# Patient Record
Sex: FEMALE | Race: WHITE | NOT HISPANIC OR LATINO
[De-identification: names, ages, dates, MRNs, and addresses within clinical notes are randomized per-mention and may not be internally consistent; named-entity substitution may affect disease eponyms.]

---

## 2016-06-17 RX ORDER — AMIODARONE HYDROCHLORIDE 400 MG/1
1 TABLET ORAL
Qty: 0 | Refills: 0 | DISCHARGE
Start: 2016-06-17

## 2017-06-10 ENCOUNTER — APPOINTMENT (OUTPATIENT)
Dept: MRI IMAGING | Facility: CLINIC | Age: 82
End: 2017-06-10

## 2017-06-10 ENCOUNTER — APPOINTMENT (OUTPATIENT)
Dept: ULTRASOUND IMAGING | Facility: CLINIC | Age: 82
End: 2017-06-10

## 2017-06-10 ENCOUNTER — OUTPATIENT (OUTPATIENT)
Dept: OUTPATIENT SERVICES | Facility: HOSPITAL | Age: 82
LOS: 1 days | End: 2017-06-10
Payer: MEDICARE

## 2017-06-10 DIAGNOSIS — Z00.8 ENCOUNTER FOR OTHER GENERAL EXAMINATION: ICD-10-CM

## 2017-06-10 DIAGNOSIS — Z98.89 OTHER SPECIFIED POSTPROCEDURAL STATES: Chronic | ICD-10-CM

## 2017-06-10 DIAGNOSIS — Z90.710 ACQUIRED ABSENCE OF BOTH CERVIX AND UTERUS: Chronic | ICD-10-CM

## 2017-06-10 PROCEDURE — 93880 EXTRACRANIAL BILAT STUDY: CPT

## 2017-06-16 ENCOUNTER — APPOINTMENT (OUTPATIENT)
Dept: CT IMAGING | Facility: CLINIC | Age: 82
End: 2017-06-16

## 2017-06-16 ENCOUNTER — OUTPATIENT (OUTPATIENT)
Dept: OUTPATIENT SERVICES | Facility: HOSPITAL | Age: 82
LOS: 1 days | End: 2017-06-16
Payer: MEDICARE

## 2017-06-16 DIAGNOSIS — R51 HEADACHE: ICD-10-CM

## 2017-06-16 DIAGNOSIS — Z90.710 ACQUIRED ABSENCE OF BOTH CERVIX AND UTERUS: Chronic | ICD-10-CM

## 2017-06-16 DIAGNOSIS — Z98.89 OTHER SPECIFIED POSTPROCEDURAL STATES: Chronic | ICD-10-CM

## 2017-06-16 PROCEDURE — 70450 CT HEAD/BRAIN W/O DYE: CPT

## 2017-06-16 PROCEDURE — 72125 CT NECK SPINE W/O DYE: CPT

## 2017-06-21 DIAGNOSIS — M50.221 OTHER CERVICAL DISC DISPLACEMENT AT C4-C5 LEVEL: ICD-10-CM

## 2017-06-21 DIAGNOSIS — R42 DIZZINESS AND GIDDINESS: ICD-10-CM

## 2017-06-21 DIAGNOSIS — M50.30 OTHER CERVICAL DISC DEGENERATION, UNSPECIFIED CERVICAL REGION: ICD-10-CM

## 2017-06-21 DIAGNOSIS — M50.222 OTHER CERVICAL DISC DISPLACEMENT AT C5-C6 LEVEL: ICD-10-CM

## 2017-07-22 ENCOUNTER — APPOINTMENT (OUTPATIENT)
Dept: ORTHOPEDIC SURGERY | Facility: CLINIC | Age: 82
End: 2017-07-22

## 2017-07-22 VITALS
HEART RATE: 77 BPM | BODY MASS INDEX: 22.73 KG/M2 | DIASTOLIC BLOOD PRESSURE: 83 MMHG | WEIGHT: 150 LBS | SYSTOLIC BLOOD PRESSURE: 154 MMHG | HEIGHT: 68 IN

## 2017-07-22 DIAGNOSIS — Z78.9 OTHER SPECIFIED HEALTH STATUS: ICD-10-CM

## 2017-07-22 DIAGNOSIS — Z87.39 PERSONAL HISTORY OF OTHER DISEASES OF THE MUSCULOSKELETAL SYSTEM AND CONNECTIVE TISSUE: ICD-10-CM

## 2017-07-22 DIAGNOSIS — Z86.39 PERSONAL HISTORY OF OTHER ENDOCRINE, NUTRITIONAL AND METABOLIC DISEASE: ICD-10-CM

## 2017-07-22 DIAGNOSIS — Z86.69 PERSONAL HISTORY OF OTHER DISEASES OF THE NERVOUS SYSTEM AND SENSE ORGANS: ICD-10-CM

## 2017-07-22 DIAGNOSIS — Z80.41 FAMILY HISTORY OF MALIGNANT NEOPLASM OF OVARY: ICD-10-CM

## 2017-07-22 DIAGNOSIS — G95.9 DISEASE OF SPINAL CORD, UNSPECIFIED: ICD-10-CM

## 2017-07-22 DIAGNOSIS — Z82.61 FAMILY HISTORY OF ARTHRITIS: ICD-10-CM

## 2017-08-02 ENCOUNTER — OUTPATIENT (OUTPATIENT)
Dept: OUTPATIENT SERVICES | Facility: HOSPITAL | Age: 82
LOS: 1 days | End: 2017-08-02
Payer: MEDICARE

## 2017-08-02 DIAGNOSIS — M47.812 SPONDYLOSIS WITHOUT MYELOPATHY OR RADICULOPATHY, CERVICAL REGION: ICD-10-CM

## 2017-08-02 DIAGNOSIS — Z90.710 ACQUIRED ABSENCE OF BOTH CERVIX AND UTERUS: Chronic | ICD-10-CM

## 2017-08-02 DIAGNOSIS — Z98.89 OTHER SPECIFIED POSTPROCEDURAL STATES: Chronic | ICD-10-CM

## 2017-09-09 PROCEDURE — 97010 HOT OR COLD PACKS THERAPY: CPT

## 2017-09-09 PROCEDURE — G8982: CPT | Mod: CJ

## 2017-09-09 PROCEDURE — G8981: CPT | Mod: CK

## 2017-09-09 PROCEDURE — 97140 MANUAL THERAPY 1/> REGIONS: CPT

## 2017-09-09 PROCEDURE — 97162 PT EVAL MOD COMPLEX 30 MIN: CPT

## 2017-09-09 PROCEDURE — 97110 THERAPEUTIC EXERCISES: CPT

## 2017-09-13 ENCOUNTER — OUTPATIENT (OUTPATIENT)
Dept: OUTPATIENT SERVICES | Facility: HOSPITAL | Age: 82
LOS: 1 days | End: 2017-09-13
Payer: MEDICARE

## 2017-09-13 DIAGNOSIS — Z51.89 ENCOUNTER FOR OTHER SPECIFIED AFTERCARE: ICD-10-CM

## 2017-09-13 DIAGNOSIS — R42 DIZZINESS AND GIDDINESS: ICD-10-CM

## 2017-09-13 DIAGNOSIS — Z90.710 ACQUIRED ABSENCE OF BOTH CERVIX AND UTERUS: Chronic | ICD-10-CM

## 2017-09-13 DIAGNOSIS — Z98.89 OTHER SPECIFIED POSTPROCEDURAL STATES: Chronic | ICD-10-CM

## 2017-09-13 DIAGNOSIS — R27.9 UNSPECIFIED LACK OF COORDINATION: ICD-10-CM

## 2017-10-13 PROCEDURE — 97163 PT EVAL HIGH COMPLEX 45 MIN: CPT

## 2017-10-13 PROCEDURE — 97112 NEUROMUSCULAR REEDUCATION: CPT

## 2017-10-13 PROCEDURE — G8990: CPT | Mod: CJ

## 2017-10-13 PROCEDURE — G8979: CPT | Mod: CH

## 2017-10-13 PROCEDURE — G8991: CPT | Mod: CH

## 2017-10-13 PROCEDURE — G8978: CPT | Mod: CJ

## 2018-01-10 ENCOUNTER — APPOINTMENT (OUTPATIENT)
Dept: ORTHOPEDIC SURGERY | Facility: CLINIC | Age: 83
End: 2018-01-10
Payer: MEDICARE

## 2018-01-10 VITALS — HEIGHT: 68 IN | BODY MASS INDEX: 24.25 KG/M2 | WEIGHT: 160 LBS

## 2018-01-10 PROCEDURE — 99214 OFFICE O/P EST MOD 30 MIN: CPT | Mod: 25

## 2018-01-10 PROCEDURE — 96372 THER/PROPH/DIAG INJ SC/IM: CPT

## 2018-02-21 ENCOUNTER — EMERGENCY (EMERGENCY)
Facility: HOSPITAL | Age: 83
LOS: 1 days | Discharge: HOME | End: 2018-02-21
Attending: EMERGENCY MEDICINE
Payer: MEDICARE

## 2018-02-21 VITALS
SYSTOLIC BLOOD PRESSURE: 190 MMHG | HEIGHT: 68 IN | WEIGHT: 149.91 LBS | DIASTOLIC BLOOD PRESSURE: 120 MMHG | OXYGEN SATURATION: 98 % | TEMPERATURE: 98 F | HEART RATE: 86 BPM | RESPIRATION RATE: 18 BRPM

## 2018-02-21 VITALS
HEART RATE: 64 BPM | DIASTOLIC BLOOD PRESSURE: 83 MMHG | RESPIRATION RATE: 20 BRPM | OXYGEN SATURATION: 99 % | SYSTOLIC BLOOD PRESSURE: 168 MMHG

## 2018-02-21 DIAGNOSIS — Z98.89 OTHER SPECIFIED POSTPROCEDURAL STATES: Chronic | ICD-10-CM

## 2018-02-21 DIAGNOSIS — Z90.710 ACQUIRED ABSENCE OF BOTH CERVIX AND UTERUS: Chronic | ICD-10-CM

## 2018-02-21 LAB
ALBUMIN SERPL ELPH-MCNC: 4.6 G/DL — SIGNIFICANT CHANGE UP (ref 3.3–5.2)
ALP SERPL-CCNC: 55 U/L — SIGNIFICANT CHANGE UP (ref 40–120)
ALT FLD-CCNC: 14 U/L — SIGNIFICANT CHANGE UP
ANION GAP SERPL CALC-SCNC: 15 MMOL/L — SIGNIFICANT CHANGE UP (ref 5–17)
APTT BLD: 37.5 SEC — HIGH (ref 27.5–37.4)
AST SERPL-CCNC: 27 U/L — SIGNIFICANT CHANGE UP
BILIRUB SERPL-MCNC: 0.8 MG/DL — SIGNIFICANT CHANGE UP (ref 0.4–2)
BLD GP AB SCN SERPL QL: SIGNIFICANT CHANGE UP
BUN SERPL-MCNC: 14 MG/DL — SIGNIFICANT CHANGE UP (ref 8–20)
CALCIUM SERPL-MCNC: 9.3 MG/DL — SIGNIFICANT CHANGE UP (ref 8.6–10.2)
CHLORIDE SERPL-SCNC: 96 MMOL/L — LOW (ref 98–107)
CO2 SERPL-SCNC: 24 MMOL/L — SIGNIFICANT CHANGE UP (ref 22–29)
CREAT SERPL-MCNC: 1.09 MG/DL — SIGNIFICANT CHANGE UP (ref 0.5–1.3)
GLUCOSE SERPL-MCNC: 105 MG/DL — SIGNIFICANT CHANGE UP (ref 70–115)
HCT VFR BLD CALC: 39.5 % — SIGNIFICANT CHANGE UP (ref 37–47)
HGB BLD-MCNC: 13.1 G/DL — SIGNIFICANT CHANGE UP (ref 12–16)
INR BLD: 1.23 RATIO — HIGH (ref 0.88–1.16)
MCHC RBC-ENTMCNC: 33.2 G/DL — SIGNIFICANT CHANGE UP (ref 32–36)
MCHC RBC-ENTMCNC: 34.7 PG — HIGH (ref 27–31)
MCV RBC AUTO: 104.5 FL — HIGH (ref 81–99)
PLATELET # BLD AUTO: 208 K/UL — SIGNIFICANT CHANGE UP (ref 150–400)
POTASSIUM SERPL-MCNC: 3.9 MMOL/L — SIGNIFICANT CHANGE UP (ref 3.5–5.3)
POTASSIUM SERPL-SCNC: 3.9 MMOL/L — SIGNIFICANT CHANGE UP (ref 3.5–5.3)
PROT SERPL-MCNC: 8 G/DL — SIGNIFICANT CHANGE UP (ref 6.6–8.7)
PROTHROM AB SERPL-ACNC: 13.6 SEC — HIGH (ref 9.8–12.7)
RBC # BLD: 3.78 M/UL — LOW (ref 4.4–5.2)
RBC # FLD: 13.5 % — SIGNIFICANT CHANGE UP (ref 11–15.6)
SODIUM SERPL-SCNC: 135 MMOL/L — SIGNIFICANT CHANGE UP (ref 135–145)
TYPE + AB SCN PNL BLD: SIGNIFICANT CHANGE UP
WBC # BLD: 6.2 K/UL — SIGNIFICANT CHANGE UP (ref 4.8–10.8)
WBC # FLD AUTO: 6.2 K/UL — SIGNIFICANT CHANGE UP (ref 4.8–10.8)

## 2018-02-21 PROCEDURE — 86850 RBC ANTIBODY SCREEN: CPT

## 2018-02-21 PROCEDURE — 30905 CONTROL OF NOSEBLEED: CPT

## 2018-02-21 PROCEDURE — 85610 PROTHROMBIN TIME: CPT

## 2018-02-21 PROCEDURE — 36415 COLL VENOUS BLD VENIPUNCTURE: CPT

## 2018-02-21 PROCEDURE — 80053 COMPREHEN METABOLIC PANEL: CPT

## 2018-02-21 PROCEDURE — 86900 BLOOD TYPING SEROLOGIC ABO: CPT

## 2018-02-21 PROCEDURE — 99284 EMERGENCY DEPT VISIT MOD MDM: CPT | Mod: 25

## 2018-02-21 PROCEDURE — 85027 COMPLETE CBC AUTOMATED: CPT

## 2018-02-21 PROCEDURE — 30905 CONTROL OF NOSEBLEED: CPT | Mod: LT

## 2018-02-21 PROCEDURE — 99283 EMERGENCY DEPT VISIT LOW MDM: CPT | Mod: 25

## 2018-02-21 PROCEDURE — 86901 BLOOD TYPING SEROLOGIC RH(D): CPT

## 2018-02-21 PROCEDURE — 85730 THROMBOPLASTIN TIME PARTIAL: CPT

## 2018-02-21 RX ORDER — CEPHALEXIN 500 MG
1 CAPSULE ORAL
Qty: 15 | Refills: 0
Start: 2018-02-21 | End: 2018-02-25

## 2018-02-21 RX ORDER — OXYMETAZOLINE HYDROCHLORIDE 0.5 MG/ML
2 SPRAY NASAL ONCE
Qty: 0 | Refills: 0 | Status: COMPLETED | OUTPATIENT
Start: 2018-02-21 | End: 2018-02-21

## 2018-02-21 RX ADMIN — OXYMETAZOLINE HYDROCHLORIDE 2 SPRAY(S): 0.5 SPRAY NASAL at 08:15

## 2018-02-21 NOTE — ED PROVIDER NOTE - CHPI ED SYMPTOMS NEG
no change in level of consciousness/no loss of consciousness/no vomiting/no weakness/no numbness/no syncope

## 2018-02-21 NOTE — ED PROVIDER NOTE - OBJECTIVE STATEMENT
89 y/o female states she takes Eliquis for afib and she was well until this morning she was washing her face and she suddenly noted a left nostril bleed and it has been bleeding steadily since no other c/o no prior nose bleeds  she did not take her am bp meds

## 2018-02-21 NOTE — ED PROVIDER NOTE - PROGRESS NOTE DETAILS
pts ent Dr Bergson called and office says Dr Cruz covering Dr Cruz ent called and case discussed bleeding now stopped he rec give keflex as prophylaxis and pt should follow up with Dr Rosales in 5 days for packing removal and return here is any problems

## 2018-02-21 NOTE — ED ADULT NURSE NOTE - OBJECTIVE STATEMENT
88 year old female in no acuter distress, alert and oriented x 4, complains of nosebleed onset this morning approx 0730. Pt with moderate bleeding, clots from left nares. Pt noted to be hypertensive, anxious. c/o headache, Iv placed, bloods drawn,  and Dr Baer at bedside, fall precautions in place, will monitor. dr Baer gave verbal order for metoprolol 25 mg as pt normally takes at home and pt medicated as ordered as 0827, will chart on emar when order entered and verified.

## 2018-02-21 NOTE — ED ADULT NURSE REASSESSMENT NOTE - NS ED NURSE REASSESS COMMENT FT1
pt hemodynamically stable, PIV removed, refer to flowsheet and chart, pt to be discharged, pt understood discharge instructions and plan of care, pt to follow up outpatient, to be discharged with nasal packing

## 2018-03-09 ENCOUNTER — APPOINTMENT (OUTPATIENT)
Dept: PHYSICAL MEDICINE AND REHAB | Facility: CLINIC | Age: 83
End: 2018-03-09

## 2018-05-16 ENCOUNTER — APPOINTMENT (OUTPATIENT)
Dept: ULTRASOUND IMAGING | Facility: CLINIC | Age: 83
End: 2018-05-16
Payer: MEDICARE

## 2018-05-16 ENCOUNTER — APPOINTMENT (OUTPATIENT)
Dept: CT IMAGING | Facility: CLINIC | Age: 83
End: 2018-05-16
Payer: MEDICARE

## 2018-05-16 ENCOUNTER — OUTPATIENT (OUTPATIENT)
Dept: OUTPATIENT SERVICES | Facility: HOSPITAL | Age: 83
LOS: 1 days | End: 2018-05-16
Payer: MEDICARE

## 2018-05-16 DIAGNOSIS — Z98.89 OTHER SPECIFIED POSTPROCEDURAL STATES: Chronic | ICD-10-CM

## 2018-05-16 DIAGNOSIS — Z90.710 ACQUIRED ABSENCE OF BOTH CERVIX AND UTERUS: Chronic | ICD-10-CM

## 2018-05-16 DIAGNOSIS — Z00.8 ENCOUNTER FOR OTHER GENERAL EXAMINATION: ICD-10-CM

## 2018-05-16 PROCEDURE — 72131 CT LUMBAR SPINE W/O DYE: CPT | Mod: 26

## 2018-05-16 PROCEDURE — 72128 CT CHEST SPINE W/O DYE: CPT | Mod: 26

## 2018-05-16 PROCEDURE — 71250 CT THORAX DX C-: CPT

## 2018-05-16 PROCEDURE — 76775 US EXAM ABDO BACK WALL LIM: CPT | Mod: 26

## 2018-05-16 PROCEDURE — 72128 CT CHEST SPINE W/O DYE: CPT

## 2018-05-16 PROCEDURE — 71250 CT THORAX DX C-: CPT | Mod: 26

## 2018-05-16 PROCEDURE — 76775 US EXAM ABDO BACK WALL LIM: CPT

## 2018-05-16 PROCEDURE — 72131 CT LUMBAR SPINE W/O DYE: CPT

## 2018-07-11 NOTE — ED PROVIDER NOTE - ENMT, MLM
Allergic RX
Airway patent, Nasal positive left nostril bleed too much bleeding to see site of bleeding Mouth with normal mucosa. Throat has no vesicles, no oropharyngeal exudates and uvula is midline.

## 2018-09-07 ENCOUNTER — OTHER (OUTPATIENT)
Age: 83
End: 2018-09-07

## 2018-10-29 ENCOUNTER — APPOINTMENT (OUTPATIENT)
Dept: DERMATOLOGY | Facility: CLINIC | Age: 83
End: 2018-10-29

## 2018-12-06 ENCOUNTER — OUTPATIENT (OUTPATIENT)
Dept: OUTPATIENT SERVICES | Facility: HOSPITAL | Age: 83
LOS: 1 days | End: 2018-12-06
Payer: MEDICARE

## 2018-12-06 ENCOUNTER — APPOINTMENT (OUTPATIENT)
Dept: RADIOLOGY | Facility: CLINIC | Age: 83
End: 2018-12-06

## 2018-12-06 DIAGNOSIS — Z98.89 OTHER SPECIFIED POSTPROCEDURAL STATES: Chronic | ICD-10-CM

## 2018-12-06 DIAGNOSIS — Z00.8 ENCOUNTER FOR OTHER GENERAL EXAMINATION: ICD-10-CM

## 2018-12-06 DIAGNOSIS — Z90.710 ACQUIRED ABSENCE OF BOTH CERVIX AND UTERUS: Chronic | ICD-10-CM

## 2018-12-06 PROCEDURE — 71046 X-RAY EXAM CHEST 2 VIEWS: CPT | Mod: 26

## 2018-12-06 PROCEDURE — 71046 X-RAY EXAM CHEST 2 VIEWS: CPT

## 2018-12-25 ENCOUNTER — EMERGENCY (EMERGENCY)
Facility: HOSPITAL | Age: 83
LOS: 1 days | Discharge: HOME | End: 2018-12-25
Attending: EMERGENCY MEDICINE
Payer: MEDICARE

## 2018-12-25 VITALS
RESPIRATION RATE: 20 BRPM | SYSTOLIC BLOOD PRESSURE: 160 MMHG | HEART RATE: 78 BPM | TEMPERATURE: 98 F | DIASTOLIC BLOOD PRESSURE: 79 MMHG | OXYGEN SATURATION: 99 %

## 2018-12-25 VITALS
WEIGHT: 139.99 LBS | OXYGEN SATURATION: 99 % | HEIGHT: 68 IN | SYSTOLIC BLOOD PRESSURE: 163 MMHG | TEMPERATURE: 97 F | RESPIRATION RATE: 20 BRPM | DIASTOLIC BLOOD PRESSURE: 88 MMHG | HEART RATE: 93 BPM

## 2018-12-25 DIAGNOSIS — Z90.710 ACQUIRED ABSENCE OF BOTH CERVIX AND UTERUS: Chronic | ICD-10-CM

## 2018-12-25 DIAGNOSIS — Z98.89 OTHER SPECIFIED POSTPROCEDURAL STATES: Chronic | ICD-10-CM

## 2018-12-25 LAB
ALBUMIN SERPL ELPH-MCNC: 4.2 G/DL — SIGNIFICANT CHANGE UP (ref 3.3–5.2)
ALP SERPL-CCNC: 61 U/L — SIGNIFICANT CHANGE UP (ref 40–120)
ALT FLD-CCNC: 23 U/L — SIGNIFICANT CHANGE UP
ANION GAP SERPL CALC-SCNC: 14 MMOL/L — SIGNIFICANT CHANGE UP (ref 5–17)
APPEARANCE UR: CLEAR — SIGNIFICANT CHANGE UP
APTT BLD: 38.5 SEC — HIGH (ref 27.5–36.3)
AST SERPL-CCNC: 41 U/L — HIGH
BACTERIA # UR AUTO: ABNORMAL
BASOPHILS # BLD AUTO: 0 K/UL — SIGNIFICANT CHANGE UP (ref 0–0.2)
BASOPHILS NFR BLD AUTO: 0.5 % — SIGNIFICANT CHANGE UP (ref 0–2)
BILIRUB SERPL-MCNC: 0.8 MG/DL — SIGNIFICANT CHANGE UP (ref 0.4–2)
BILIRUB UR-MCNC: NEGATIVE — SIGNIFICANT CHANGE UP
BLD GP AB SCN SERPL QL: SIGNIFICANT CHANGE UP
BUN SERPL-MCNC: 9 MG/DL — SIGNIFICANT CHANGE UP (ref 8–20)
CALCIUM SERPL-MCNC: 9 MG/DL — SIGNIFICANT CHANGE UP (ref 8.6–10.2)
CHLORIDE SERPL-SCNC: 93 MMOL/L — LOW (ref 98–107)
CO2 SERPL-SCNC: 23 MMOL/L — SIGNIFICANT CHANGE UP (ref 22–29)
COLOR SPEC: YELLOW — SIGNIFICANT CHANGE UP
CREAT SERPL-MCNC: 0.69 MG/DL — SIGNIFICANT CHANGE UP (ref 0.5–1.3)
DIFF PNL FLD: ABNORMAL
EOSINOPHIL # BLD AUTO: 0.1 K/UL — SIGNIFICANT CHANGE UP (ref 0–0.5)
EOSINOPHIL NFR BLD AUTO: 1.6 % — SIGNIFICANT CHANGE UP (ref 0–6)
EPI CELLS # UR: SIGNIFICANT CHANGE UP
GLUCOSE SERPL-MCNC: 104 MG/DL — SIGNIFICANT CHANGE UP (ref 70–115)
GLUCOSE UR QL: NEGATIVE MG/DL — SIGNIFICANT CHANGE UP
HCT VFR BLD CALC: 34.1 % — LOW (ref 37–47)
HGB BLD-MCNC: 11.3 G/DL — LOW (ref 12–16)
INR BLD: 1.24 RATIO — HIGH (ref 0.88–1.16)
KETONES UR-MCNC: NEGATIVE — SIGNIFICANT CHANGE UP
LEUKOCYTE ESTERASE UR-ACNC: NEGATIVE — SIGNIFICANT CHANGE UP
LIDOCAIN IGE QN: 48 U/L — SIGNIFICANT CHANGE UP (ref 22–51)
LYMPHOCYTES # BLD AUTO: 1.4 K/UL — SIGNIFICANT CHANGE UP (ref 1–4.8)
LYMPHOCYTES # BLD AUTO: 24.6 % — SIGNIFICANT CHANGE UP (ref 20–55)
MCHC RBC-ENTMCNC: 32.2 PG — HIGH (ref 27–31)
MCHC RBC-ENTMCNC: 33.1 G/DL — SIGNIFICANT CHANGE UP (ref 32–36)
MCV RBC AUTO: 97.2 FL — SIGNIFICANT CHANGE UP (ref 81–99)
MONOCYTES # BLD AUTO: 0.6 K/UL — SIGNIFICANT CHANGE UP (ref 0–0.8)
MONOCYTES NFR BLD AUTO: 11.1 % — HIGH (ref 3–10)
NEUTROPHILS # BLD AUTO: 3.4 K/UL — SIGNIFICANT CHANGE UP (ref 1.8–8)
NEUTROPHILS NFR BLD AUTO: 62 % — SIGNIFICANT CHANGE UP (ref 37–73)
NITRITE UR-MCNC: NEGATIVE — SIGNIFICANT CHANGE UP
PH UR: 7 — SIGNIFICANT CHANGE UP (ref 5–8)
PLATELET # BLD AUTO: 186 K/UL — SIGNIFICANT CHANGE UP (ref 150–400)
POTASSIUM SERPL-MCNC: 3.8 MMOL/L — SIGNIFICANT CHANGE UP (ref 3.5–5.3)
POTASSIUM SERPL-SCNC: 3.8 MMOL/L — SIGNIFICANT CHANGE UP (ref 3.5–5.3)
PROT SERPL-MCNC: 7.4 G/DL — SIGNIFICANT CHANGE UP (ref 6.6–8.7)
PROT UR-MCNC: 30 MG/DL
PROTHROM AB SERPL-ACNC: 14.3 SEC — HIGH (ref 10–12.9)
RBC # BLD: 3.51 M/UL — LOW (ref 4.4–5.2)
RBC # FLD: 13.4 % — SIGNIFICANT CHANGE UP (ref 11–15.6)
RBC CASTS # UR COMP ASSIST: ABNORMAL /HPF (ref 0–4)
SODIUM SERPL-SCNC: 130 MMOL/L — LOW (ref 135–145)
SP GR SPEC: 1 — LOW (ref 1.01–1.02)
TYPE + AB SCN PNL BLD: SIGNIFICANT CHANGE UP
UROBILINOGEN FLD QL: NEGATIVE MG/DL — SIGNIFICANT CHANGE UP
WBC # BLD: 5.5 K/UL — SIGNIFICANT CHANGE UP (ref 4.8–10.8)
WBC # FLD AUTO: 5.5 K/UL — SIGNIFICANT CHANGE UP (ref 4.8–10.8)
WBC UR QL: SIGNIFICANT CHANGE UP

## 2018-12-25 PROCEDURE — 74176 CT ABD & PELVIS W/O CONTRAST: CPT

## 2018-12-25 PROCEDURE — 99284 EMERGENCY DEPT VISIT MOD MDM: CPT

## 2018-12-25 PROCEDURE — 36415 COLL VENOUS BLD VENIPUNCTURE: CPT

## 2018-12-25 PROCEDURE — 96374 THER/PROPH/DIAG INJ IV PUSH: CPT

## 2018-12-25 PROCEDURE — 99284 EMERGENCY DEPT VISIT MOD MDM: CPT | Mod: 25

## 2018-12-25 PROCEDURE — 83690 ASSAY OF LIPASE: CPT

## 2018-12-25 PROCEDURE — 74176 CT ABD & PELVIS W/O CONTRAST: CPT | Mod: 26

## 2018-12-25 PROCEDURE — 87086 URINE CULTURE/COLONY COUNT: CPT

## 2018-12-25 PROCEDURE — 86901 BLOOD TYPING SEROLOGIC RH(D): CPT

## 2018-12-25 PROCEDURE — 86900 BLOOD TYPING SEROLOGIC ABO: CPT

## 2018-12-25 PROCEDURE — 85610 PROTHROMBIN TIME: CPT

## 2018-12-25 PROCEDURE — 81001 URINALYSIS AUTO W/SCOPE: CPT

## 2018-12-25 PROCEDURE — 80053 COMPREHEN METABOLIC PANEL: CPT

## 2018-12-25 PROCEDURE — 86850 RBC ANTIBODY SCREEN: CPT

## 2018-12-25 PROCEDURE — 85730 THROMBOPLASTIN TIME PARTIAL: CPT

## 2018-12-25 PROCEDURE — 85027 COMPLETE CBC AUTOMATED: CPT

## 2018-12-25 RX ORDER — OXYCODONE AND ACETAMINOPHEN 5; 325 MG/1; MG/1
1 TABLET ORAL ONCE
Qty: 0 | Refills: 0 | Status: COMPLETED | OUTPATIENT
Start: 2018-12-25 | End: 2018-12-25

## 2018-12-25 RX ORDER — MORPHINE SULFATE 50 MG/1
2 CAPSULE, EXTENDED RELEASE ORAL ONCE
Qty: 0 | Refills: 0 | Status: DISCONTINUED | OUTPATIENT
Start: 2018-12-25 | End: 2018-12-25

## 2018-12-25 RX ADMIN — MORPHINE SULFATE 2 MILLIGRAM(S): 50 CAPSULE, EXTENDED RELEASE ORAL at 06:39

## 2018-12-25 NOTE — ED PROVIDER NOTE - NS ED MD DISPO DISCHARGE CCDA
pt to ER from nursing home w/ report of erratic behavior and R-side facial droop.  Pt combative on arrival in ER, 12 lead ekg done and shown to ER attending physician.  Pt maintained on CM.  IV access established, labs drawn and sent.  Pt to CT.  Breathing unlabored, skin warm and dry. 1:1 observer in place.  Will continue to monitor. pt to ER from nursing home w/ report of erratic behavior and L-side facial droop.  Pt combative on arrival in ER, 12 lead ekg done and shown to ER attending physician.  Pt maintained on CM.  IV access established, labs drawn and sent.  Pt to CT.  Breathing unlabored, skin warm and dry. 1:1 observer in place.  Will continue to monitor. pt to ER from nursing home w/ report of erratic behavior and L-side facial droop.  Pt combative on arrival in ER, 12 lead ekg done and shown to ER attending physician.  Right-side facial droop w/ L. facial twitch noted.  arm and leg strength 5/5 maricarmen.  Pt's eyes tracking w/ ER staff interaction but pt agitated, non-cooperative and combative.  Pt verbalizing that she wants to get out of bed and wants to leave ER.  Two point restraints initiated.  Pt maintained on CM.  IV access established, labs drawn and sent.  1mg ativan given IV to prepare pt for CT.  Pt to CT.  Breathing unlabored at rest, skin warm and dry. 1:1 observer in place.  Will continue to monitor. Patient/Caregiver provided printed discharge information.

## 2018-12-25 NOTE — ED PROVIDER NOTE - NS ED ROS FT
ROS: CONTUSIONAL: Denies fever, chills, fatigue, wt loss. HEAD: Denies trauma, HA, Dizziness. EYE: Denies Acute visual changes, diplopia. ENMT: Denies change in hearing, tinnitus, epistaxis, difficulty swallowing, sore throat. CARDIO: Denies CP, palpitations, edema. RESP: Denies Cough, SOB , Diff breathing, hemoptysis. GI: Denies V, change in bowel movement. URINARY: Denies difficulty urinating, pelvic pain. MS:  Denies joint pain, back pain, weakness, decreased ROM, swelling. NEURO: Denies change in gait, seizures, loss of sensation, dizziness, confusion LOC.  PSY: NO SI/HI.

## 2018-12-25 NOTE — ED PROVIDER NOTE - ATTENDING CONTRIBUTION TO CARE
I personally saw the patient with the PA, and completed the key components of the history and physical exam. I then discussed the management plan with the PA. In brief Hx (flank pain )Exam( left CVAT) Plan (PT found to have renal colic and will follow up urology )

## 2018-12-25 NOTE — ED PROVIDER NOTE - OBJECTIVE STATEMENT
PT with SPMHX of A-fib, GERD presents to the ED with complaint of RT lower back pain x3 days that have gotten progressively worse. PT states that pain is sever radiating to her groin, sharp and has been causing her to be N. PT states that they have not done anything for the pain prior to coming to the ED. PT dines fever, chills, weakness, numbness, vaginal bleeding, urinary symptoms.

## 2018-12-25 NOTE — ED PROVIDER NOTE - ABDOMINAL EXAM
no guarding no rebound. Rt sided CVAT./no pulsating masses/nontender.../soft/no organomegaly/nondistended

## 2018-12-25 NOTE — ED PROVIDER NOTE - MEDICAL DECISION MAKING DETAILS
PT with stable VS, no acute distress, non toxic appearing, tolerating PO in the ED, resolution of symptoms after conservative medical intervention, PT with small kidney stone, no s/s of infection, chronic hyponatremia will not tx at this time, DC home follow up to PCP, urology pt educated about when to return to the ED if needed. PT verbalizes that he understands all instructions and results.

## 2018-12-25 NOTE — ED ADULT NURSE NOTE - OBJECTIVE STATEMENT
Received patient sitting in bed, a&ox3, able to make needs known. Patient states she's been having back pain for a few days now and just took tylenol with no effect. Patient on eliquis for afib, with left chest wall pacemaker. Patient able to ambulate with 1 person assist from wheelchair to bed. Patient denies chest pain, sob, dizziness, headache, fever and chills. Patient not in distress.

## 2018-12-26 LAB
CULTURE RESULTS: NO GROWTH — SIGNIFICANT CHANGE UP
SPECIMEN SOURCE: SIGNIFICANT CHANGE UP

## 2019-01-01 ENCOUNTER — EMERGENCY (EMERGENCY)
Facility: HOSPITAL | Age: 84
LOS: 1 days | Discharge: HOME | End: 2019-01-01
Attending: STUDENT IN AN ORGANIZED HEALTH CARE EDUCATION/TRAINING PROGRAM
Payer: MEDICARE

## 2019-01-01 VITALS
WEIGHT: 149.91 LBS | RESPIRATION RATE: 18 BRPM | HEIGHT: 68 IN | OXYGEN SATURATION: 98 % | SYSTOLIC BLOOD PRESSURE: 186 MMHG | HEART RATE: 90 BPM | DIASTOLIC BLOOD PRESSURE: 125 MMHG | TEMPERATURE: 97 F

## 2019-01-01 DIAGNOSIS — Z98.89 OTHER SPECIFIED POSTPROCEDURAL STATES: Chronic | ICD-10-CM

## 2019-01-01 DIAGNOSIS — Z90.710 ACQUIRED ABSENCE OF BOTH CERVIX AND UTERUS: Chronic | ICD-10-CM

## 2019-01-01 LAB
ALBUMIN SERPL ELPH-MCNC: 4.2 G/DL — SIGNIFICANT CHANGE UP (ref 3.3–5.2)
ALP SERPL-CCNC: 68 U/L — SIGNIFICANT CHANGE UP (ref 40–120)
ALT FLD-CCNC: 23 U/L — SIGNIFICANT CHANGE UP
ANION GAP SERPL CALC-SCNC: 14 MMOL/L — SIGNIFICANT CHANGE UP (ref 5–17)
APPEARANCE UR: CLEAR — SIGNIFICANT CHANGE UP
AST SERPL-CCNC: 41 U/L — HIGH
BASOPHILS # BLD AUTO: 0 K/UL — SIGNIFICANT CHANGE UP (ref 0–0.2)
BASOPHILS NFR BLD AUTO: 0.9 % — SIGNIFICANT CHANGE UP (ref 0–2)
BILIRUB SERPL-MCNC: 0.6 MG/DL — SIGNIFICANT CHANGE UP (ref 0.4–2)
BILIRUB UR-MCNC: NEGATIVE — SIGNIFICANT CHANGE UP
BUN SERPL-MCNC: 13 MG/DL — SIGNIFICANT CHANGE UP (ref 8–20)
CALCIUM SERPL-MCNC: 9 MG/DL — SIGNIFICANT CHANGE UP (ref 8.6–10.2)
CHLORIDE SERPL-SCNC: 95 MMOL/L — LOW (ref 98–107)
CO2 SERPL-SCNC: 23 MMOL/L — SIGNIFICANT CHANGE UP (ref 22–29)
COLOR SPEC: YELLOW — SIGNIFICANT CHANGE UP
CREAT SERPL-MCNC: 0.85 MG/DL — SIGNIFICANT CHANGE UP (ref 0.5–1.3)
DIFF PNL FLD: ABNORMAL
EOSINOPHIL # BLD AUTO: 0.2 K/UL — SIGNIFICANT CHANGE UP (ref 0–0.5)
EOSINOPHIL NFR BLD AUTO: 2.6 % — SIGNIFICANT CHANGE UP (ref 0–6)
GLUCOSE SERPL-MCNC: 95 MG/DL — SIGNIFICANT CHANGE UP (ref 70–115)
GLUCOSE UR QL: NEGATIVE MG/DL — SIGNIFICANT CHANGE UP
HCT VFR BLD CALC: 32.8 % — LOW (ref 37–47)
HGB BLD-MCNC: 10.5 G/DL — LOW (ref 12–16)
KETONES UR-MCNC: NEGATIVE — SIGNIFICANT CHANGE UP
LEUKOCYTE ESTERASE UR-ACNC: ABNORMAL
LYMPHOCYTES # BLD AUTO: 2 K/UL — SIGNIFICANT CHANGE UP (ref 1–4.8)
LYMPHOCYTES # BLD AUTO: 35.4 % — SIGNIFICANT CHANGE UP (ref 20–55)
MCHC RBC-ENTMCNC: 31.6 PG — HIGH (ref 27–31)
MCHC RBC-ENTMCNC: 32 G/DL — SIGNIFICANT CHANGE UP (ref 32–36)
MCV RBC AUTO: 98.8 FL — SIGNIFICANT CHANGE UP (ref 81–99)
MONOCYTES # BLD AUTO: 0.6 K/UL — SIGNIFICANT CHANGE UP (ref 0–0.8)
MONOCYTES NFR BLD AUTO: 10.2 % — HIGH (ref 3–10)
NEUTROPHILS # BLD AUTO: 2.9 K/UL — SIGNIFICANT CHANGE UP (ref 1.8–8)
NEUTROPHILS NFR BLD AUTO: 50.7 % — SIGNIFICANT CHANGE UP (ref 37–73)
NITRITE UR-MCNC: NEGATIVE — SIGNIFICANT CHANGE UP
PH UR: 5 — SIGNIFICANT CHANGE UP (ref 5–8)
PLATELET # BLD AUTO: 192 K/UL — SIGNIFICANT CHANGE UP (ref 150–400)
POTASSIUM SERPL-MCNC: 4.5 MMOL/L — SIGNIFICANT CHANGE UP (ref 3.5–5.3)
POTASSIUM SERPL-SCNC: 4.5 MMOL/L — SIGNIFICANT CHANGE UP (ref 3.5–5.3)
PROT SERPL-MCNC: 7.4 G/DL — SIGNIFICANT CHANGE UP (ref 6.6–8.7)
PROT UR-MCNC: 15 MG/DL
RBC # BLD: 3.32 M/UL — LOW (ref 4.4–5.2)
RBC # FLD: 13.9 % — SIGNIFICANT CHANGE UP (ref 11–15.6)
SODIUM SERPL-SCNC: 132 MMOL/L — LOW (ref 135–145)
SP GR SPEC: 1.01 — SIGNIFICANT CHANGE UP (ref 1.01–1.02)
UROBILINOGEN FLD QL: NEGATIVE MG/DL — SIGNIFICANT CHANGE UP
WBC # BLD: 5.7 K/UL — SIGNIFICANT CHANGE UP (ref 4.8–10.8)
WBC # FLD AUTO: 5.7 K/UL — SIGNIFICANT CHANGE UP (ref 4.8–10.8)

## 2019-01-01 PROCEDURE — 99284 EMERGENCY DEPT VISIT MOD MDM: CPT

## 2019-01-01 PROCEDURE — 74177 CT ABD & PELVIS W/CONTRAST: CPT | Mod: 26

## 2019-01-01 RX ORDER — KETOROLAC TROMETHAMINE 30 MG/ML
15 SYRINGE (ML) INJECTION ONCE
Qty: 0 | Refills: 0 | Status: DISCONTINUED | OUTPATIENT
Start: 2019-01-01 | End: 2019-01-01

## 2019-01-01 RX ORDER — TAMSULOSIN HYDROCHLORIDE 0.4 MG/1
0.4 CAPSULE ORAL AT BEDTIME
Qty: 0 | Refills: 0 | Status: DISCONTINUED | OUTPATIENT
Start: 2019-01-01 | End: 2019-01-06

## 2019-01-01 RX ORDER — MORPHINE SULFATE 50 MG/1
2 CAPSULE, EXTENDED RELEASE ORAL ONCE
Qty: 0 | Refills: 0 | Status: DISCONTINUED | OUTPATIENT
Start: 2019-01-01 | End: 2019-01-01

## 2019-01-01 RX ORDER — ONDANSETRON 8 MG/1
4 TABLET, FILM COATED ORAL ONCE
Qty: 0 | Refills: 0 | Status: COMPLETED | OUTPATIENT
Start: 2019-01-01 | End: 2019-01-01

## 2019-01-01 RX ORDER — SODIUM CHLORIDE 9 MG/ML
1000 INJECTION INTRAMUSCULAR; INTRAVENOUS; SUBCUTANEOUS ONCE
Qty: 0 | Refills: 0 | Status: COMPLETED | OUTPATIENT
Start: 2019-01-01 | End: 2019-01-01

## 2019-01-01 RX ORDER — MORPHINE SULFATE 50 MG/1
4 CAPSULE, EXTENDED RELEASE ORAL ONCE
Qty: 0 | Refills: 0 | Status: DISCONTINUED | OUTPATIENT
Start: 2019-01-01 | End: 2019-01-01

## 2019-01-01 RX ADMIN — MORPHINE SULFATE 2 MILLIGRAM(S): 50 CAPSULE, EXTENDED RELEASE ORAL at 20:35

## 2019-01-01 RX ADMIN — MORPHINE SULFATE 2 MILLIGRAM(S): 50 CAPSULE, EXTENDED RELEASE ORAL at 21:26

## 2019-01-01 RX ADMIN — SODIUM CHLORIDE 1000 MILLILITER(S): 9 INJECTION INTRAMUSCULAR; INTRAVENOUS; SUBCUTANEOUS at 21:35

## 2019-01-01 RX ADMIN — MORPHINE SULFATE 4 MILLIGRAM(S): 50 CAPSULE, EXTENDED RELEASE ORAL at 23:09

## 2019-01-01 RX ADMIN — MORPHINE SULFATE 4 MILLIGRAM(S): 50 CAPSULE, EXTENDED RELEASE ORAL at 21:25

## 2019-01-01 RX ADMIN — TAMSULOSIN HYDROCHLORIDE 0.4 MILLIGRAM(S): 0.4 CAPSULE ORAL at 21:22

## 2019-01-01 RX ADMIN — SODIUM CHLORIDE 1000 MILLILITER(S): 9 INJECTION INTRAMUSCULAR; INTRAVENOUS; SUBCUTANEOUS at 20:35

## 2019-01-01 RX ADMIN — ONDANSETRON 4 MILLIGRAM(S): 8 TABLET, FILM COATED ORAL at 20:35

## 2019-01-01 NOTE — ED ADULT TRIAGE NOTE - CHIEF COMPLAINT QUOTE
Patient arrived to ED today with c/o right flank pain.  Patient recently diagnosed with kidney stone.

## 2019-01-01 NOTE — ED STATDOCS - PROGRESS NOTE DETAILS
PA Note: Pt evaluated by intake physician. HPI/ROS/PE as noted above. Will follow up plan per intake physician and continue to assess patient. PA Note: Pt continues to c/o pain. Morphine 4mg ordered; CT abd/pelv PO & IV for further evaluation. PA Note: Pt evaluated by intake physician. HPI/ROS/PE as noted above. Will follow up plan per intake physician and continue to assess patient.  Additional Note: No rashes appreciated. PA Note: Improvement of pain. PA Note: Denies pain and nausea. PO challenge; discharge. PA Note: Tolerating PO. Continues to remain asymptomatic. Would like to go home with outpatient follow up.

## 2019-01-01 NOTE — ED ADULT NURSE NOTE - NSIMPLEMENTINTERV_GEN_ALL_ED
Implemented All Universal Safety Interventions:  [place] to call system. Call bell, personal items and telephone within reach. Instruct patient to call for assistance. Room bathroom lighting operational. Non-slip footwear when patient is off stretcher. Physically safe environment: no spills, clutter or unnecessary equipment. Stretcher in lowest position, wheels locked, appropriate side rails in place.

## 2019-01-01 NOTE — ED STATDOCS - ATTENDING CONTRIBUTION TO CARE
I, Antonella Ba, performed the initial face to face bedside interview with this patient regarding history of present illness, review of symptoms and relevant past medical, social and family history.  I completed an independent physical examination.  I was the initial provider who evaluated this patient. I have signed out the follow up of any pending tests (i.e. labs, radiological studies) to the ACP.  I have communicated the patient’s plan of care and disposition with the ACP.  The history, relevant review of systems, past medical and surgical history, medical decision making, and physical examination was documented by the scribe in my presence and I attest to the accuracy of the documentation.

## 2019-01-01 NOTE — ED STATDOCS - OBJECTIVE STATEMENT
87 y/o F pt with hx of GERD, HTN and Afib presents to ED with  c/o R sided flank pain. Pt reports she was here 1 week ago (12/25), with similar symptoms, and was told she had a kidney stone. Pt has not been able to see a urologist yet. Denies fever. No further complaints at this time.

## 2019-01-01 NOTE — ED ADULT NURSE NOTE - OBJECTIVE STATEMENT
pt reports right sided flank pain since shawna, resolved and then came back today. explained as a stabbing intermittent pain.

## 2019-01-02 VITALS
SYSTOLIC BLOOD PRESSURE: 168 MMHG | DIASTOLIC BLOOD PRESSURE: 84 MMHG | OXYGEN SATURATION: 100 % | HEART RATE: 86 BPM | RESPIRATION RATE: 18 BRPM

## 2019-01-02 LAB
EPI CELLS # UR: SIGNIFICANT CHANGE UP
RBC CASTS # UR COMP ASSIST: SIGNIFICANT CHANGE UP /HPF (ref 0–4)
WBC UR QL: SIGNIFICANT CHANGE UP

## 2019-01-02 PROCEDURE — 96374 THER/PROPH/DIAG INJ IV PUSH: CPT

## 2019-01-02 PROCEDURE — 93005 ELECTROCARDIOGRAM TRACING: CPT

## 2019-01-02 PROCEDURE — 93010 ELECTROCARDIOGRAM REPORT: CPT

## 2019-01-02 PROCEDURE — 96376 TX/PRO/DX INJ SAME DRUG ADON: CPT

## 2019-01-02 PROCEDURE — 85027 COMPLETE CBC AUTOMATED: CPT

## 2019-01-02 PROCEDURE — 36415 COLL VENOUS BLD VENIPUNCTURE: CPT

## 2019-01-02 PROCEDURE — 81001 URINALYSIS AUTO W/SCOPE: CPT

## 2019-01-02 PROCEDURE — 96375 TX/PRO/DX INJ NEW DRUG ADDON: CPT | Mod: XU

## 2019-01-02 PROCEDURE — 80053 COMPREHEN METABOLIC PANEL: CPT

## 2019-01-02 PROCEDURE — 74177 CT ABD & PELVIS W/CONTRAST: CPT

## 2019-01-02 PROCEDURE — 99284 EMERGENCY DEPT VISIT MOD MDM: CPT | Mod: 25

## 2019-01-02 PROCEDURE — 96361 HYDRATE IV INFUSION ADD-ON: CPT

## 2019-01-02 RX ORDER — ONDANSETRON 8 MG/1
4 TABLET, FILM COATED ORAL ONCE
Qty: 0 | Refills: 0 | Status: COMPLETED | OUTPATIENT
Start: 2019-01-02 | End: 2019-01-02

## 2019-01-02 RX ADMIN — ONDANSETRON 4 MILLIGRAM(S): 8 TABLET, FILM COATED ORAL at 00:39

## 2019-01-08 ENCOUNTER — OUTPATIENT (OUTPATIENT)
Dept: OUTPATIENT SERVICES | Facility: HOSPITAL | Age: 84
LOS: 1 days | End: 2019-01-08
Payer: MEDICARE

## 2019-01-08 ENCOUNTER — APPOINTMENT (OUTPATIENT)
Dept: RADIOLOGY | Facility: CLINIC | Age: 84
End: 2019-01-08

## 2019-01-08 DIAGNOSIS — Z98.89 OTHER SPECIFIED POSTPROCEDURAL STATES: Chronic | ICD-10-CM

## 2019-01-08 DIAGNOSIS — Z00.8 ENCOUNTER FOR OTHER GENERAL EXAMINATION: ICD-10-CM

## 2019-01-08 DIAGNOSIS — Z90.710 ACQUIRED ABSENCE OF BOTH CERVIX AND UTERUS: Chronic | ICD-10-CM

## 2019-01-08 PROCEDURE — 71046 X-RAY EXAM CHEST 2 VIEWS: CPT | Mod: 26

## 2019-01-08 PROCEDURE — 71046 X-RAY EXAM CHEST 2 VIEWS: CPT

## 2019-01-24 ENCOUNTER — APPOINTMENT (OUTPATIENT)
Dept: PULMONOLOGY | Facility: CLINIC | Age: 84
End: 2019-01-24
Payer: MEDICARE

## 2019-01-24 VITALS
HEART RATE: 82 BPM | OXYGEN SATURATION: 98 % | BODY MASS INDEX: 20.61 KG/M2 | DIASTOLIC BLOOD PRESSURE: 68 MMHG | SYSTOLIC BLOOD PRESSURE: 120 MMHG | WEIGHT: 136 LBS | HEIGHT: 68 IN

## 2019-01-24 DIAGNOSIS — R05 COUGH: ICD-10-CM

## 2019-01-24 PROCEDURE — 94664 DEMO&/EVAL PT USE INHALER: CPT | Mod: 59

## 2019-01-24 PROCEDURE — 99205 OFFICE O/P NEW HI 60 MIN: CPT | Mod: 25

## 2019-01-24 PROCEDURE — 94060 EVALUATION OF WHEEZING: CPT

## 2019-01-24 RX ORDER — AMIODARONE HYDROCHLORIDE 400 MG/1
TABLET ORAL
Refills: 0 | Status: DISCONTINUED | COMMUNITY
End: 2019-01-24

## 2019-01-24 RX ORDER — METHYLPREDNISOLONE 4 MG/1
4 TABLET ORAL
Qty: 1 | Refills: 1 | Status: DISCONTINUED | COMMUNITY
Start: 2018-01-10 | End: 2019-01-24

## 2019-01-24 RX ORDER — TIZANIDINE 4 MG/1
4 TABLET ORAL EVERY 8 HOURS
Qty: 60 | Refills: 3 | Status: DISCONTINUED | COMMUNITY
Start: 2018-01-10 | End: 2019-01-24

## 2019-01-24 NOTE — PROCEDURE
[FreeTextEntry1] : CT chest 5/18: bronchial wall thickening, thyromegaly, no tracheal obs\par \par CXR 1/19; NAD, PPM

## 2019-01-24 NOTE — PHYSICAL EXAM
[General Appearance - Well Developed] : well developed [General Appearance - Well Nourished] : well nourished [Normal Conjunctiva] : the conjunctiva exhibited no abnormalities [Normal Oropharynx] : normal oropharynx [II] : II [Neck Appearance] : the appearance of the neck was normal [Heart Rate And Rhythm] : heart rate and rhythm were normal [Heart Sounds] : normal S1 and S2 [Murmurs] : no murmurs present [Respiration, Rhythm And Depth] : normal respiratory rhythm and effort [Exaggerated Use Of Accessory Muscles For Inspiration] : no accessory muscle use [Auscultation Breath Sounds / Voice Sounds] : lungs were clear to auscultation bilaterally [Lungs Percussion] : the lungs were normal to percussion [Abnormal Walk] : normal gait [Nail Clubbing] : no clubbing of the fingernails [Cyanosis, Localized] : no localized cyanosis [] : no rash [No Focal Deficits] : no focal deficits [Oriented To Time, Place, And Person] : oriented to person, place, and time [Impaired Insight] : insight and judgment were intact [Affect] : the affect was normal [Memory Recent] : recent memory was not impaired [FreeTextEntry1] : no chest wall abn

## 2019-01-24 NOTE — HISTORY OF PRESENT ILLNESS
[FreeTextEntry1] : cough x months\par worse at night time\par denies any rhinitis or GERD\par received abx\par cough has improved\par denies anty swallowing issues \par never smoked\par able to perform ADLs \par no Env exposures\par has a fib, followed by Dr Quintana, on full AC

## 2019-01-24 NOTE — DISCUSSION/SUMMARY
[FreeTextEntry1] : Asthma mild-moderate persistent, PAF on full AC, chronic dyspnea at baseline\par improving cough, worsened by URI, denies rhinitis or GERD\par no bronchospasm on exam , normal sp02\par spirometry with mild-moderate air flow obstruction\par recent CXR negative 1/19, CT 5/18 with bronchial wall thickening c/w airways disease\par Discussed options with pt, she does not want a controller inhaler ( ICS/LABA) or Singulair at this time\par Will allow medrol dose pack and prn rescue\par Asthma profile to better evaluate phenotype\par 2-3 months or sooner if needed

## 2019-01-24 NOTE — CONSULT LETTER
[Dear  ___] : Dear  [unfilled], [Consult Letter:] : I had the pleasure of evaluating your patient, [unfilled]. [Please see my note below.] : Please see my note below. [Sincerely,] : Sincerely, [FreeTextEntry3] : Emery Reardon DO [facility]\par Pulmonary Critical Care\par Director Pulmonary Division\par Medical Director Respiratory Therapy\par [facility]\par \par  [DrLian  ___] : Dr. JOHNSON [DrLian ___] : Dr. JOHNSON

## 2019-04-11 ENCOUNTER — APPOINTMENT (OUTPATIENT)
Dept: PULMONOLOGY | Facility: CLINIC | Age: 84
End: 2019-04-11
Payer: MEDICARE

## 2019-04-11 VITALS
DIASTOLIC BLOOD PRESSURE: 80 MMHG | OXYGEN SATURATION: 98 % | HEART RATE: 66 BPM | WEIGHT: 135 LBS | BODY MASS INDEX: 20.53 KG/M2 | SYSTOLIC BLOOD PRESSURE: 118 MMHG

## 2019-04-11 PROCEDURE — 99213 OFFICE O/P EST LOW 20 MIN: CPT

## 2019-04-11 RX ORDER — METHYLPREDNISOLONE 4 MG/1
4 TABLET ORAL
Qty: 1 | Refills: 3 | Status: DISCONTINUED | COMMUNITY
Start: 2019-01-24 | End: 2019-04-11

## 2019-04-11 NOTE — HISTORY OF PRESENT ILLNESS
[FreeTextEntry1] : no new pulmonary complaints\par using prn rescue\par has a fib, followed by Dr Quintana, on full AC

## 2019-04-11 NOTE — CONSULT LETTER
[Dear  ___] : Dear  [unfilled], [Please see my note below.] : Please see my note below. [Consult Letter:] : I had the pleasure of evaluating your patient, [unfilled]. [DrLian  ___] : Dr. JOHNSON [Sincerely,] : Sincerely, [DrLian ___] : Dr. JOHNSON [FreeTextEntry3] : [facility] DO [facility]\par Pulmonary Critical Care\par Director Pulmonary Division\par Medical Director Respiratory Therapy\par [facility]\par \par

## 2019-04-11 NOTE — DISCUSSION/SUMMARY
[FreeTextEntry1] : Asthma mild-moderate persistent, PAF on full AC, chronic dyspnea at baseline\par no bronchospasm on exam , normal sp02\par doing well, no pulmonary complaints\par spirometry with mild-moderate air flow obstruction\par recent CXR negative 1/19, CT 5/18 with bronchial wall thickening c/w airways disease\par Discussed options with pt, she does not want a controller inhaler ( ICS/LABA) or Singulair at this time\par 6 months or sooner if needed

## 2019-04-11 NOTE — PHYSICAL EXAM
[General Appearance - Well Developed] : well developed [General Appearance - Well Nourished] : well nourished [Normal Conjunctiva] : the conjunctiva exhibited no abnormalities [Normal Oropharynx] : normal oropharynx [II] : II [Neck Appearance] : the appearance of the neck was normal [Heart Rate And Rhythm] : heart rate and rhythm were normal [Heart Sounds] : normal S1 and S2 [Murmurs] : no murmurs present [Exaggerated Use Of Accessory Muscles For Inspiration] : no accessory muscle use [Respiration, Rhythm And Depth] : normal respiratory rhythm and effort [Auscultation Breath Sounds / Voice Sounds] : lungs were clear to auscultation bilaterally [Lungs Percussion] : the lungs were normal to percussion [Nail Clubbing] : no clubbing of the fingernails [Cyanosis, Localized] : no localized cyanosis [Abnormal Walk] : normal gait [No Focal Deficits] : no focal deficits [Oriented To Time, Place, And Person] : oriented to person, place, and time [Affect] : the affect was normal [Impaired Insight] : insight and judgment were intact [Memory Recent] : recent memory was not impaired [FreeTextEntry1] : no chest wall abn [] : no rash

## 2019-09-10 ENCOUNTER — OUTPATIENT (OUTPATIENT)
Dept: OUTPATIENT SERVICES | Facility: HOSPITAL | Age: 84
LOS: 1 days | End: 2019-09-10
Payer: MEDICARE

## 2019-09-10 ENCOUNTER — APPOINTMENT (OUTPATIENT)
Dept: RADIOLOGY | Facility: CLINIC | Age: 84
End: 2019-09-10
Payer: MEDICARE

## 2019-09-10 DIAGNOSIS — N20.0 CALCULUS OF KIDNEY: ICD-10-CM

## 2019-09-10 DIAGNOSIS — Z98.89 OTHER SPECIFIED POSTPROCEDURAL STATES: Chronic | ICD-10-CM

## 2019-09-10 DIAGNOSIS — Z90.710 ACQUIRED ABSENCE OF BOTH CERVIX AND UTERUS: Chronic | ICD-10-CM

## 2019-09-10 PROCEDURE — 74018 RADEX ABDOMEN 1 VIEW: CPT

## 2019-09-10 PROCEDURE — 74018 RADEX ABDOMEN 1 VIEW: CPT | Mod: 26

## 2019-10-09 ENCOUNTER — APPOINTMENT (OUTPATIENT)
Dept: PULMONOLOGY | Facility: CLINIC | Age: 84
End: 2019-10-09
Payer: MEDICARE

## 2019-10-09 VITALS
OXYGEN SATURATION: 98 % | WEIGHT: 136 LBS | HEART RATE: 74 BPM | SYSTOLIC BLOOD PRESSURE: 110 MMHG | BODY MASS INDEX: 20.61 KG/M2 | DIASTOLIC BLOOD PRESSURE: 70 MMHG | HEIGHT: 68 IN

## 2019-10-09 PROCEDURE — 99213 OFFICE O/P EST LOW 20 MIN: CPT

## 2019-10-09 NOTE — DISCUSSION/SUMMARY
[FreeTextEntry1] : Asthma mild-moderate persistent, PAF on full AC, chronic dyspnea at baseline\par no bronchospasm on exam , normal sp02\par doing well, no pulmonary complaints\par spirometry with mild-moderate air flow obstruction\par recent CXR negative 1/19, CT 5/18 with bronchial wall thickening c/w airways disease\par Discussed options with pt, she does not want a controller inhaler ( ICS/LABA) or Singulair at this time\par to call if sig rescue use, will f/u  6 months\par vaccinations this fall

## 2019-10-09 NOTE — PHYSICAL EXAM
[General Appearance - Well Developed] : well developed [Normal Conjunctiva] : the conjunctiva exhibited no abnormalities [General Appearance - Well Nourished] : well nourished [Normal Oropharynx] : normal oropharynx [II] : II [Neck Appearance] : the appearance of the neck was normal [Heart Rate And Rhythm] : heart rate and rhythm were normal [Heart Sounds] : normal S1 and S2 [Respiration, Rhythm And Depth] : normal respiratory rhythm and effort [Murmurs] : no murmurs present [Auscultation Breath Sounds / Voice Sounds] : lungs were clear to auscultation bilaterally [Exaggerated Use Of Accessory Muscles For Inspiration] : no accessory muscle use [Lungs Percussion] : the lungs were normal to percussion [Nail Clubbing] : no clubbing of the fingernails [Cyanosis, Localized] : no localized cyanosis [Abnormal Walk] : normal gait [Oriented To Time, Place, And Person] : oriented to person, place, and time [No Focal Deficits] : no focal deficits [Affect] : the affect was normal [Impaired Insight] : insight and judgment were intact [Memory Recent] : recent memory was not impaired [] : no rash [FreeTextEntry1] : no chest wall abn

## 2019-10-09 NOTE — CONSULT LETTER
[Dear  ___] : Dear  [unfilled], [Consult Letter:] : I had the pleasure of evaluating your patient, [unfilled]. [Sincerely,] : Sincerely, [Please see my note below.] : Please see my note below. [DrLian ___] : Dr. JOHNSON [DrLian  ___] : Dr. JOHNSON [FreeTextEntry3] : Emery Reardon DO [facility]\par Pulmonary Critical Care\par Director Pulmonary Division\par Medical Director Respiratory Therapy\par [facility]\par \par

## 2020-04-08 ENCOUNTER — APPOINTMENT (OUTPATIENT)
Dept: ULTRASOUND IMAGING | Facility: CLINIC | Age: 85
End: 2020-04-08
Payer: MEDICARE

## 2020-04-08 ENCOUNTER — APPOINTMENT (OUTPATIENT)
Dept: RADIOLOGY | Facility: CLINIC | Age: 85
End: 2020-04-08
Payer: MEDICARE

## 2020-04-08 ENCOUNTER — OUTPATIENT (OUTPATIENT)
Dept: OUTPATIENT SERVICES | Facility: HOSPITAL | Age: 85
LOS: 1 days | End: 2020-04-08
Payer: MEDICARE

## 2020-04-08 DIAGNOSIS — Z00.8 ENCOUNTER FOR OTHER GENERAL EXAMINATION: ICD-10-CM

## 2020-04-08 DIAGNOSIS — Z90.710 ACQUIRED ABSENCE OF BOTH CERVIX AND UTERUS: Chronic | ICD-10-CM

## 2020-04-08 DIAGNOSIS — Z98.89 OTHER SPECIFIED POSTPROCEDURAL STATES: Chronic | ICD-10-CM

## 2020-04-08 PROCEDURE — 76770 US EXAM ABDO BACK WALL COMP: CPT | Mod: 26

## 2020-04-08 PROCEDURE — 76775 US EXAM ABDO BACK WALL LIM: CPT | Mod: 26

## 2020-04-08 PROCEDURE — 74018 RADEX ABDOMEN 1 VIEW: CPT | Mod: 26

## 2020-04-08 PROCEDURE — 76770 US EXAM ABDO BACK WALL COMP: CPT

## 2020-04-08 PROCEDURE — 74018 RADEX ABDOMEN 1 VIEW: CPT

## 2020-04-08 PROCEDURE — 76775 US EXAM ABDO BACK WALL LIM: CPT

## 2020-04-09 ENCOUNTER — APPOINTMENT (OUTPATIENT)
Dept: CT IMAGING | Facility: CLINIC | Age: 85
End: 2020-04-09
Payer: MEDICARE

## 2020-04-09 ENCOUNTER — OUTPATIENT (OUTPATIENT)
Dept: OUTPATIENT SERVICES | Facility: HOSPITAL | Age: 85
LOS: 1 days | End: 2020-04-09
Payer: MEDICARE

## 2020-04-09 DIAGNOSIS — Z98.89 OTHER SPECIFIED POSTPROCEDURAL STATES: Chronic | ICD-10-CM

## 2020-04-09 DIAGNOSIS — Z90.710 ACQUIRED ABSENCE OF BOTH CERVIX AND UTERUS: Chronic | ICD-10-CM

## 2020-04-09 DIAGNOSIS — Z00.00 ENCOUNTER FOR GENERAL ADULT MEDICAL EXAMINATION WITHOUT ABNORMAL FINDINGS: ICD-10-CM

## 2020-04-09 PROCEDURE — 74176 CT ABD & PELVIS W/O CONTRAST: CPT

## 2020-04-09 PROCEDURE — 74176 CT ABD & PELVIS W/O CONTRAST: CPT | Mod: 26

## 2020-04-22 ENCOUNTER — APPOINTMENT (OUTPATIENT)
Dept: PULMONOLOGY | Facility: CLINIC | Age: 85
End: 2020-04-22
Payer: MEDICARE

## 2020-04-22 PROCEDURE — 99441: CPT

## 2020-10-19 ENCOUNTER — APPOINTMENT (OUTPATIENT)
Dept: PULMONOLOGY | Facility: CLINIC | Age: 85
End: 2020-10-19
Payer: MEDICARE

## 2020-10-19 VITALS
HEIGHT: 65 IN | OXYGEN SATURATION: 99 % | DIASTOLIC BLOOD PRESSURE: 80 MMHG | BODY MASS INDEX: 22.82 KG/M2 | HEART RATE: 101 BPM | WEIGHT: 137 LBS | RESPIRATION RATE: 15 BRPM | SYSTOLIC BLOOD PRESSURE: 120 MMHG

## 2020-10-19 PROCEDURE — 99214 OFFICE O/P EST MOD 30 MIN: CPT

## 2020-10-19 RX ORDER — METOPROLOL SUCCINATE 100 MG/1
TABLET, EXTENDED RELEASE ORAL
Refills: 0 | Status: DISCONTINUED | COMMUNITY
End: 2020-10-19

## 2020-10-19 NOTE — CONSULT LETTER
[Dear  ___] : Dear  [unfilled], [Consult Letter:] : I had the pleasure of evaluating your patient, [unfilled]. [Sincerely,] : Sincerely, [Please see my note below.] : Please see my note below. [DrLian  ___] : Dr. JOHNSON [DrLian ___] : Dr. JOHNSON [FreeTextEntry3] : Emer[facility] Reardon DO [facility]\par Pulmonary Critical Care\par Director Pulmonary Division\par Medical Director Respiratory Therapy\par [facility]\par \par

## 2020-10-19 NOTE — PHYSICAL EXAM
[General Appearance - Well Developed] : well developed [General Appearance - Well Nourished] : well nourished [Normal Conjunctiva] : the conjunctiva exhibited no abnormalities [Normal Oropharynx] : normal oropharynx [II] : II [Neck Appearance] : the appearance of the neck was normal [Heart Rate And Rhythm] : heart rate and rhythm were normal [Heart Sounds] : normal S1 and S2 [Murmurs] : no murmurs present [Respiration, Rhythm And Depth] : normal respiratory rhythm and effort [Exaggerated Use Of Accessory Muscles For Inspiration] : no accessory muscle use [Auscultation Breath Sounds / Voice Sounds] : lungs were clear to auscultation bilaterally [Lungs Percussion] : the lungs were normal to percussion [Nail Clubbing] : no clubbing of the fingernails [Abnormal Walk] : normal gait [Cyanosis, Localized] : no localized cyanosis [No Focal Deficits] : no focal deficits [Oriented To Time, Place, And Person] : oriented to person, place, and time [Memory Recent] : recent memory was not impaired [Impaired Insight] : insight and judgment were intact [Affect] : the affect was normal [] : no rash [FreeTextEntry1] : no chest wall abn

## 2020-10-19 NOTE — HISTORY OF PRESENT ILLNESS
[FreeTextEntry1] : no new pulmonary complaints\par using prn rescue, rarely\par has a fib, followed by Dr Quintana, on full AC

## 2020-10-19 NOTE — DISCUSSION/SUMMARY
[FreeTextEntry1] : Asthma mild-moderate persistent, PAF on full AC, chronic dyspnea at baseline\par no bronchospasm on exam , normal sp02\par doing well, no pulmonary complaints\par spirometry with mild-moderate air flow obstruction\par recent CXR negative 1/19, CT 5/18 with bronchial wall thickening c/w airways disease\par Discussed options with pt, she does not want a controller inhaler ( ICS/LABA) or Singulair at this time\par to call if sig rescue use, will f/u  6 months, does not want PFts currently, re evaluate at f/u\par vaccinations this fall

## 2020-10-30 ENCOUNTER — APPOINTMENT (OUTPATIENT)
Dept: GASTROENTEROLOGY | Facility: CLINIC | Age: 85
End: 2020-10-30
Payer: MEDICARE

## 2020-10-30 VITALS
RESPIRATION RATE: 14 BRPM | HEIGHT: 65 IN | BODY MASS INDEX: 21.66 KG/M2 | OXYGEN SATURATION: 97 % | SYSTOLIC BLOOD PRESSURE: 116 MMHG | WEIGHT: 130 LBS | HEART RATE: 85 BPM | DIASTOLIC BLOOD PRESSURE: 70 MMHG | TEMPERATURE: 96.7 F

## 2020-10-30 DIAGNOSIS — Z86.79 PERSONAL HISTORY OF OTHER DISEASES OF THE CIRCULATORY SYSTEM: ICD-10-CM

## 2020-10-30 DIAGNOSIS — Z86.39 PERSONAL HISTORY OF OTHER ENDOCRINE, NUTRITIONAL AND METABOLIC DISEASE: ICD-10-CM

## 2020-10-30 DIAGNOSIS — I50.9 HEART FAILURE, UNSPECIFIED: ICD-10-CM

## 2020-10-30 DIAGNOSIS — K64.4 RESIDUAL HEMORRHOIDAL SKIN TAGS: ICD-10-CM

## 2020-10-30 PROCEDURE — 82272 OCCULT BLD FECES 1-3 TESTS: CPT

## 2020-10-30 PROCEDURE — 99204 OFFICE O/P NEW MOD 45 MIN: CPT

## 2020-10-30 RX ORDER — APIXABAN 5 MG/1
5 TABLET, FILM COATED ORAL
Refills: 0 | Status: DISCONTINUED | COMMUNITY
End: 2020-10-30

## 2020-10-30 RX ORDER — GABAPENTIN 100 MG/1
CAPSULE ORAL
Refills: 0 | Status: ACTIVE | COMMUNITY

## 2020-10-30 RX ORDER — FUROSEMIDE 40 MG/1
40 TABLET ORAL
Refills: 0 | Status: ACTIVE | COMMUNITY

## 2020-10-30 RX ORDER — METOPROLOL TARTRATE 50 MG/1
50 TABLET, FILM COATED ORAL
Qty: 60 | Refills: 0 | Status: ACTIVE | COMMUNITY
Start: 2020-10-08

## 2020-10-30 RX ORDER — MULTIVITAMIN
TABLET ORAL
Refills: 0 | Status: ACTIVE | COMMUNITY

## 2020-10-30 RX ORDER — CALCIUM CITRATE/VITAMIN D3 315MG-6.25
TABLET ORAL
Refills: 0 | Status: ACTIVE | COMMUNITY

## 2020-10-30 RX ORDER — ASCORBIC ACID 500 MG
TABLET ORAL
Refills: 0 | Status: ACTIVE | COMMUNITY

## 2020-10-30 RX ORDER — APIXABAN 2.5 MG/1
2.5 TABLET, FILM COATED ORAL
Qty: 60 | Refills: 0 | Status: ACTIVE | COMMUNITY
Start: 2019-12-02

## 2020-10-30 RX ORDER — ALBUTEROL SULFATE 90 UG/1
108 (90 BASE) AEROSOL, METERED RESPIRATORY (INHALATION)
Qty: 3 | Refills: 0 | Status: ACTIVE | COMMUNITY
Start: 2019-01-24

## 2020-10-30 RX ORDER — LEVOTHYROXINE SODIUM 0.05 MG/1
50 TABLET ORAL
Refills: 0 | Status: ACTIVE | COMMUNITY

## 2020-10-31 PROBLEM — I50.9 CHF WITH UNKNOWN LVEF: Status: RESOLVED | Noted: 2020-10-31 | Resolved: 2020-10-31

## 2020-10-31 PROBLEM — Z86.79 HISTORY OF HYPERTENSION: Status: RESOLVED | Noted: 2020-10-31 | Resolved: 2020-10-31

## 2020-10-31 PROBLEM — Z86.79 HISTORY OF ATRIAL FIBRILLATION: Status: RESOLVED | Noted: 2020-10-31 | Resolved: 2020-10-31

## 2020-10-31 PROBLEM — Z86.39 HISTORY OF HYPOTHYROIDISM: Status: RESOLVED | Noted: 2020-10-31 | Resolved: 2020-10-31

## 2020-10-31 NOTE — PHYSICAL EXAM
[Sclera] : the sclera and conjunctiva were normal [PERRL With Normal Accommodation] : pupils were equal in size, round, and reactive to light [Extraocular Movements] : extraocular movements were intact [Outer Ear] : the ears and nose were normal in appearance [Oropharynx] : the oropharynx was normal [Neck Appearance] : the appearance of the neck was normal [Neck Cervical Mass (___cm)] : no neck mass was observed [Jugular Venous Distention Increased] : there was no jugular-venous distention [Thyroid Diffuse Enlargement] : the thyroid was not enlarged [Thyroid Nodule] : there were no palpable thyroid nodules [Auscultation Breath Sounds / Voice Sounds] : lungs were clear to auscultation bilaterally [Heart Rate And Rhythm] : heart rate was normal and rhythm regular [Heart Sounds] : normal S1 and S2 [Heart Sounds Gallop] : no gallops [Murmurs] : no murmurs [Heart Sounds Pericardial Friction Rub] : no pericardial rub [Full Pulse] : the pedal pulses are present [Edema] : there was no peripheral edema [Bowel Sounds] : normal bowel sounds [Abdomen Soft] : soft [Abdomen Tenderness] : non-tender [Abdomen Mass (___ Cm)] : no abdominal mass palpated [Normal Sphincter Tone] : normal sphincter tone [No Rectal Mass] : no rectal mass [External Hemorrhoid] : external hemorrhoids [Abnormal Walk] : normal gait [Nail Clubbing] : no clubbing  or cyanosis of the fingernails [Musculoskeletal - Swelling] : no joint swelling seen [Motor Tone] : muscle strength and tone were normal [Skin Color & Pigmentation] : normal skin color and pigmentation [Skin Turgor] : normal skin turgor [] : no rash [Internal Hemorrhoid] : no internal hemorrhoids [Occult Blood Positive] : stool was negative for occult blood [FreeTextEntry1] : Very thin.

## 2020-10-31 NOTE — HISTORY OF PRESENT ILLNESS
[FreeTextEntry1] : 89-year-old white female with history of atrial fibrillation hypertension mild CHF hypothyroidism cervical neuropathy, a moderate weight loss 30 pounds over 3 years. Frail appearing. Hard of hearing. Accompanied by  and daughter Dori Camejo RN at the Endo suite. PCP recorded a stool positive for occult blood on a spontaneously voided sample. Patient reports abdominal notes occur daily or every other day. She has external hemorrhoids that have been present for many years. They are intermittently active and increase with constipation. She will frequently note some bright red blood on the tissue paper with wiping. Denies abdominal pain or alteration in pattern of bowel movements. Denies nausea or vomiting. Patient has had a prior colonoscopy greater than 10 years ago allegedly negative. Results not available.\par Recent blood work is not available. Patient is chronically maintained on anticoagulation for atrial fibrillation with Eliquis. No other new medical issues. All medications are chronic and stable.

## 2020-10-31 NOTE — CONSULT LETTER
[Dear  ___] : Dear  [unfilled], [Consult Letter:] : I had the pleasure of evaluating your patient, [unfilled]. [Please see my note below.] : Please see my note below. [Consult Closing:] : Thank you very much for allowing me to participate in the care of this patient.  If you have any questions, please do not hesitate to contact me. [Sincerely,] : Sincerely, [FreeTextEntry1] : Stool positive for occult blood. Obvious external bleeding hemorrhoids in patient on full anticoagulation with Eliquis.  Frail and comorbidities exist.  I have advised against colonoscopy and against virtual colonoscopy.   [FreeTextEntry3] : Mook Washburn MD FACG\par Diplomate American Board of Internal Medicine and Gastroenterolgy\par [facility]\par

## 2020-10-31 NOTE — ASSESSMENT
[FreeTextEntry1] : Chronic external hemorrhoids intermittently bleeding with low-grade symptomatology.\par Stool positive for occult blood on spontaneously voided stool. Patient is on chronic anticoagulation with Eliquis for atrial fibrillation.\par It is highly probable that the external hemorrhoids are the source for the stool for occult blood. \par Patient has had an abdominal CAT scan with oral and IV contrast in 4/2020 which was negative.\par Last colonoscopy was negative but done so remotely. \par One can never definitively exclude colorectal neoplasia  without a colonoscopy.   However, given the patient's Frailty and multiple medical comorbidities, a colonoscopy would carry inherent risk for bleeding and/or perforation and/or complications of reversal of anticoagulation.  I believe the risks of colonoscopy exceed the potential benefits, and would not proceed with colonoscopy now.\par I believe that a virtual colonoscopy would not had very much information in this case,  as a recent diagnostic abdominal CT was negative 6 months ago.\par Advised GI office followup in 4 months to establish stability.\par

## 2021-01-07 NOTE — ED ADULT TRIAGE NOTE - SPO2 (%)
98 Detail Level: Simple Additional Notes: Chest looks clear, upper arms,face,neck is clear Render Risk Assessment In Note?: no

## 2021-02-09 ENCOUNTER — APPOINTMENT (OUTPATIENT)
Dept: GASTROENTEROLOGY | Facility: CLINIC | Age: 86
End: 2021-02-09

## 2021-02-18 NOTE — ED ADULT NURSE NOTE - NS ED NOTE  TALK SOMEONE YN
Pt presents c/o SOB and anxiety. Pt reports hx of similar c/o over the past month. +SOB at rest, 7/10 generalized ab pain. Denies any anxiety medication, cough, n/v/d, CP, dizziness, exposure to ill. Hx of bipolar and depression.   No

## 2021-04-12 ENCOUNTER — APPOINTMENT (OUTPATIENT)
Dept: PULMONOLOGY | Facility: CLINIC | Age: 86
End: 2021-04-12
Payer: MEDICARE

## 2021-04-12 VITALS
SYSTOLIC BLOOD PRESSURE: 98 MMHG | DIASTOLIC BLOOD PRESSURE: 64 MMHG | WEIGHT: 128 LBS | TEMPERATURE: 97.1 F | RESPIRATION RATE: 14 BRPM | OXYGEN SATURATION: 98 % | BODY MASS INDEX: 21.33 KG/M2 | HEIGHT: 65 IN | HEART RATE: 80 BPM

## 2021-04-12 VITALS — DIASTOLIC BLOOD PRESSURE: 50 MMHG | SYSTOLIC BLOOD PRESSURE: 100 MMHG

## 2021-04-12 DIAGNOSIS — Z23 ENCOUNTER FOR IMMUNIZATION: ICD-10-CM

## 2021-04-12 PROCEDURE — 99213 OFFICE O/P EST LOW 20 MIN: CPT

## 2021-04-12 NOTE — PHYSICAL EXAM
[General Appearance - Well Developed] : well developed [General Appearance - Well Nourished] : well nourished [Normal Conjunctiva] : the conjunctiva exhibited no abnormalities [Normal Oropharynx] : normal oropharynx [II] : II [Neck Appearance] : the appearance of the neck was normal [Heart Rate And Rhythm] : heart rate and rhythm were normal [Heart Sounds] : normal S1 and S2 [Murmurs] : no murmurs present [Respiration, Rhythm And Depth] : normal respiratory rhythm and effort [Exaggerated Use Of Accessory Muscles For Inspiration] : no accessory muscle use [Auscultation Breath Sounds / Voice Sounds] : lungs were clear to auscultation bilaterally [Lungs Percussion] : the lungs were normal to percussion [FreeTextEntry1] : no chest wall abn [Abnormal Walk] : normal gait [Nail Clubbing] : no clubbing of the fingernails [Cyanosis, Localized] : no localized cyanosis [No Focal Deficits] : no focal deficits [Oriented To Time, Place, And Person] : oriented to person, place, and time [Impaired Insight] : insight and judgment were intact [Affect] : the affect was normal [Memory Recent] : recent memory was not impaired [] : no rash

## 2021-04-12 NOTE — DISCUSSION/SUMMARY
[FreeTextEntry1] : Asthma mild-moderate persistent, PAF on full AC, chronic dyspnea at baseline, slight increase rescue use\par no bronchospasm on exam , normal sp02\par doing well, no pulmonary complaints\par spirometry with mild-moderate air flow obstruction\par last  CXR negative 1/19, CT 5/18 with bronchial wall thickening c/w airways disease\par Discussed options with pt, she does not want a controller inhaler ( ICS/LABA) or Singulair at this time\par to call if sig rescue use, will f/u  6 months, does not want PFts currently, re evaluate at f/u\par vaccinations this fall \par BP noted, no bleeding, denies any lightheadedness they will recheck BP at home today and contact PMD with results

## 2021-04-12 NOTE — HISTORY OF PRESENT ILLNESS
[FreeTextEntry1] : no new pulmonary complaints\par using prn rescue, more frequently\par has a fib, followed by Dr Quintana, on full AC\par no fevers, chest pain or dyspnea

## 2021-10-12 ENCOUNTER — APPOINTMENT (OUTPATIENT)
Dept: PULMONOLOGY | Facility: CLINIC | Age: 86
End: 2021-10-12
Payer: MEDICARE

## 2021-10-12 VITALS
HEART RATE: 65 BPM | BODY MASS INDEX: 21.97 KG/M2 | WEIGHT: 132 LBS | DIASTOLIC BLOOD PRESSURE: 72 MMHG | OXYGEN SATURATION: 98 % | RESPIRATION RATE: 14 BRPM | SYSTOLIC BLOOD PRESSURE: 116 MMHG

## 2021-10-12 DIAGNOSIS — J45.40 MODERATE PERSISTENT ASTHMA, UNCOMPLICATED: ICD-10-CM

## 2021-10-12 DIAGNOSIS — J47.9 BRONCHIECTASIS, UNCOMPLICATED: ICD-10-CM

## 2021-10-12 PROCEDURE — 99213 OFFICE O/P EST LOW 20 MIN: CPT

## 2021-10-12 NOTE — CONSULT LETTER
[Dear  ___] : Dear  [unfilled], [Consult Letter:] : I had the pleasure of evaluating your patient, [unfilled]. [Please see my note below.] : Please see my note below. [Sincerely,] : Sincerely, [DrLian  ___] : Dr. JOHNSON [DrLian ___] : Dr. JOHNSON [FreeTextEntry3] : [facility]ardon DO [facility]\par Pulmonary Critical Care\par Director Pulmonary Division\par Medical Director Respiratory Therapy\par [facility]\par \par

## 2021-10-12 NOTE — DISCUSSION/SUMMARY
[FreeTextEntry1] : Asthma mild-moderate persistent, PAF on full AC, at baseline, no sig rescue use\par no bronchospasm on exam , normal sp02\par doing well, no pulmonary complaints\par spirometry with mild-moderate air flow obstruction\par last  CXR negative 1/19, CT 5/18 with bronchial wall thickening c/w airways disease\par Discussed options with pt, she does not want a controller inhaler ( ICS/LABA) or Singulair at this time\par to call if sig rescue use, will f/u  6 months, does not want PFts currently, re evaluate at f/u\par vaccinations this fall ( flu), had Covid x 3\par 6 months or sooner if needed

## 2021-10-12 NOTE — PHYSICAL EXAM
[General Appearance - Well Nourished] : well nourished [General Appearance - Well Developed] : well developed [Normal Conjunctiva] : the conjunctiva exhibited no abnormalities [Normal Oropharynx] : normal oropharynx [II] : II [Neck Appearance] : the appearance of the neck was normal [Heart Rate And Rhythm] : heart rate and rhythm were normal [Heart Sounds] : normal S1 and S2 [Murmurs] : no murmurs present [Respiration, Rhythm And Depth] : normal respiratory rhythm and effort [Exaggerated Use Of Accessory Muscles For Inspiration] : no accessory muscle use [Auscultation Breath Sounds / Voice Sounds] : lungs were clear to auscultation bilaterally [Lungs Percussion] : the lungs were normal to percussion [Abnormal Walk] : normal gait [Nail Clubbing] : no clubbing of the fingernails [Cyanosis, Localized] : no localized cyanosis [No Focal Deficits] : no focal deficits [Oriented To Time, Place, And Person] : oriented to person, place, and time [Impaired Insight] : insight and judgment were intact [Affect] : the affect was normal [Memory Recent] : recent memory was not impaired [] : no rash [FreeTextEntry1] : no chest wall abn

## 2021-10-17 ENCOUNTER — INPATIENT (INPATIENT)
Facility: HOSPITAL | Age: 86
LOS: 3 days | Discharge: HOME | DRG: 644 | End: 2021-10-21
Attending: FAMILY MEDICINE | Admitting: STUDENT IN AN ORGANIZED HEALTH CARE EDUCATION/TRAINING PROGRAM
Payer: MEDICARE

## 2021-10-17 VITALS
DIASTOLIC BLOOD PRESSURE: 105 MMHG | TEMPERATURE: 98 F | OXYGEN SATURATION: 97 % | SYSTOLIC BLOOD PRESSURE: 174 MMHG | HEART RATE: 111 BPM | RESPIRATION RATE: 17 BRPM | HEIGHT: 68 IN | WEIGHT: 132.28 LBS

## 2021-10-17 DIAGNOSIS — Z90.710 ACQUIRED ABSENCE OF BOTH CERVIX AND UTERUS: Chronic | ICD-10-CM

## 2021-10-17 DIAGNOSIS — Z98.89 OTHER SPECIFIED POSTPROCEDURAL STATES: Chronic | ICD-10-CM

## 2021-10-17 LAB
ALBUMIN SERPL ELPH-MCNC: 4.5 G/DL — SIGNIFICANT CHANGE UP (ref 3.3–5.2)
ALP SERPL-CCNC: 65 U/L — SIGNIFICANT CHANGE UP (ref 40–120)
ALT FLD-CCNC: 21 U/L — SIGNIFICANT CHANGE UP
ANION GAP SERPL CALC-SCNC: 14 MMOL/L — SIGNIFICANT CHANGE UP (ref 5–17)
APPEARANCE UR: CLEAR — SIGNIFICANT CHANGE UP
AST SERPL-CCNC: 44 U/L — HIGH
BASOPHILS # BLD AUTO: 0.04 K/UL — SIGNIFICANT CHANGE UP (ref 0–0.2)
BASOPHILS NFR BLD AUTO: 0.7 % — SIGNIFICANT CHANGE UP (ref 0–2)
BILIRUB SERPL-MCNC: 1 MG/DL — SIGNIFICANT CHANGE UP (ref 0.4–2)
BILIRUB UR-MCNC: NEGATIVE — SIGNIFICANT CHANGE UP
BUN SERPL-MCNC: 14.4 MG/DL — SIGNIFICANT CHANGE UP (ref 8–20)
CALCIUM SERPL-MCNC: 9.2 MG/DL — SIGNIFICANT CHANGE UP (ref 8.6–10.2)
CHLORIDE SERPL-SCNC: 88 MMOL/L — LOW (ref 98–107)
CO2 SERPL-SCNC: 21 MMOL/L — LOW (ref 22–29)
COLOR SPEC: YELLOW — SIGNIFICANT CHANGE UP
CREAT SERPL-MCNC: 0.85 MG/DL — SIGNIFICANT CHANGE UP (ref 0.5–1.3)
DIFF PNL FLD: ABNORMAL
EOSINOPHIL # BLD AUTO: 0.11 K/UL — SIGNIFICANT CHANGE UP (ref 0–0.5)
EOSINOPHIL NFR BLD AUTO: 1.8 % — SIGNIFICANT CHANGE UP (ref 0–6)
EPI CELLS # UR: SIGNIFICANT CHANGE UP
GLUCOSE SERPL-MCNC: 113 MG/DL — HIGH (ref 70–99)
GLUCOSE UR QL: NEGATIVE MG/DL — SIGNIFICANT CHANGE UP
HCT VFR BLD CALC: 32.9 % — LOW (ref 34.5–45)
HGB BLD-MCNC: 11.7 G/DL — SIGNIFICANT CHANGE UP (ref 11.5–15.5)
IMM GRANULOCYTES NFR BLD AUTO: 0.3 % — SIGNIFICANT CHANGE UP (ref 0–1.5)
KETONES UR-MCNC: NEGATIVE — SIGNIFICANT CHANGE UP
LACTATE BLDV-MCNC: 1.2 MMOL/L — SIGNIFICANT CHANGE UP (ref 0.5–2)
LEUKOCYTE ESTERASE UR-ACNC: ABNORMAL
LYMPHOCYTES # BLD AUTO: 1.43 K/UL — SIGNIFICANT CHANGE UP (ref 1–3.3)
LYMPHOCYTES # BLD AUTO: 23.3 % — SIGNIFICANT CHANGE UP (ref 13–44)
MCHC RBC-ENTMCNC: 34.3 PG — HIGH (ref 27–34)
MCHC RBC-ENTMCNC: 35.6 GM/DL — SIGNIFICANT CHANGE UP (ref 32–36)
MCV RBC AUTO: 96.5 FL — SIGNIFICANT CHANGE UP (ref 80–100)
MONOCYTES # BLD AUTO: 0.5 K/UL — SIGNIFICANT CHANGE UP (ref 0–0.9)
MONOCYTES NFR BLD AUTO: 8.1 % — SIGNIFICANT CHANGE UP (ref 2–14)
NEUTROPHILS # BLD AUTO: 4.05 K/UL — SIGNIFICANT CHANGE UP (ref 1.8–7.4)
NEUTROPHILS NFR BLD AUTO: 65.8 % — SIGNIFICANT CHANGE UP (ref 43–77)
NITRITE UR-MCNC: NEGATIVE — SIGNIFICANT CHANGE UP
PH UR: 7 — SIGNIFICANT CHANGE UP (ref 5–8)
PLATELET # BLD AUTO: 129 K/UL — LOW (ref 150–400)
POTASSIUM SERPL-MCNC: 4.2 MMOL/L — SIGNIFICANT CHANGE UP (ref 3.5–5.3)
POTASSIUM SERPL-SCNC: 4.2 MMOL/L — SIGNIFICANT CHANGE UP (ref 3.5–5.3)
PROT SERPL-MCNC: 7.9 G/DL — SIGNIFICANT CHANGE UP (ref 6.6–8.7)
PROT UR-MCNC: 100 MG/DL
RBC # BLD: 3.41 M/UL — LOW (ref 3.8–5.2)
RBC # FLD: 12.5 % — SIGNIFICANT CHANGE UP (ref 10.3–14.5)
RBC CASTS # UR COMP ASSIST: SIGNIFICANT CHANGE UP /HPF (ref 0–4)
SODIUM SERPL-SCNC: 123 MMOL/L — LOW (ref 135–145)
SP GR SPEC: 1.01 — SIGNIFICANT CHANGE UP (ref 1.01–1.02)
TROPONIN T SERPL-MCNC: <0.01 NG/ML — SIGNIFICANT CHANGE UP (ref 0–0.06)
UROBILINOGEN FLD QL: NEGATIVE MG/DL — SIGNIFICANT CHANGE UP
WBC # BLD: 6.15 K/UL — SIGNIFICANT CHANGE UP (ref 3.8–10.5)
WBC # FLD AUTO: 6.15 K/UL — SIGNIFICANT CHANGE UP (ref 3.8–10.5)
WBC UR QL: SIGNIFICANT CHANGE UP

## 2021-10-17 PROCEDURE — 99285 EMERGENCY DEPT VISIT HI MDM: CPT

## 2021-10-17 RX ORDER — SODIUM CHLORIDE 9 MG/ML
1000 INJECTION INTRAMUSCULAR; INTRAVENOUS; SUBCUTANEOUS ONCE
Refills: 0 | Status: COMPLETED | OUTPATIENT
Start: 2021-10-17 | End: 2021-10-17

## 2021-10-17 RX ORDER — FAMOTIDINE 10 MG/ML
20 INJECTION INTRAVENOUS ONCE
Refills: 0 | Status: COMPLETED | OUTPATIENT
Start: 2021-10-17 | End: 2021-10-17

## 2021-10-17 RX ORDER — KETOROLAC TROMETHAMINE 30 MG/ML
15 SYRINGE (ML) INJECTION ONCE
Refills: 0 | Status: DISCONTINUED | OUTPATIENT
Start: 2021-10-17 | End: 2021-10-17

## 2021-10-17 RX ORDER — MORPHINE SULFATE 50 MG/1
2 CAPSULE, EXTENDED RELEASE ORAL ONCE
Refills: 0 | Status: DISCONTINUED | OUTPATIENT
Start: 2021-10-17 | End: 2021-10-17

## 2021-10-17 RX ORDER — ONDANSETRON 8 MG/1
4 TABLET, FILM COATED ORAL ONCE
Refills: 0 | Status: COMPLETED | OUTPATIENT
Start: 2021-10-17 | End: 2021-10-17

## 2021-10-17 RX ADMIN — MORPHINE SULFATE 2 MILLIGRAM(S): 50 CAPSULE, EXTENDED RELEASE ORAL at 21:55

## 2021-10-17 RX ADMIN — ONDANSETRON 4 MILLIGRAM(S): 8 TABLET, FILM COATED ORAL at 21:43

## 2021-10-17 RX ADMIN — Medication 15 MILLIGRAM(S): at 23:58

## 2021-10-17 RX ADMIN — MORPHINE SULFATE 2 MILLIGRAM(S): 50 CAPSULE, EXTENDED RELEASE ORAL at 21:43

## 2021-10-17 RX ADMIN — SODIUM CHLORIDE 500 MILLILITER(S): 9 INJECTION INTRAMUSCULAR; INTRAVENOUS; SUBCUTANEOUS at 23:39

## 2021-10-17 RX ADMIN — FAMOTIDINE 20 MILLIGRAM(S): 10 INJECTION INTRAVENOUS at 23:46

## 2021-10-17 NOTE — ED PROVIDER NOTE - HIV OFFER
-- DO NOT REPLY / DO NOT REPLY ALL --  -- Message is from the [facility]--    COVID-19 Universal Screening: N/A - Not about scheduling    General Patient Message      Reason for Call: Patient was discharge. and need insulin needles and needles for injector pen.    Caller Information       Type Contact Phone    10/08/2020 04:49 PM CDT Phone (Incoming) [facility] [phone]          Alternative phone number: none    Turnaround time given to caller:   \"This message will be sent to [state Provider's name]. The clinical team will fulfill your request as soon as they review your message.\"     Opt out

## 2021-10-17 NOTE — ED PROVIDER NOTE - NSICDXFAMILYHX_GEN_ALL_CORE_FT
FAMILY HISTORY:  Mother  Still living? No  Family history of ovarian cancer, Age at diagnosis: Age Unknown

## 2021-10-17 NOTE — ED PROVIDER NOTE - CADM POA PRESS ULCER
[address]   [facility] [address]  Phone: [phone]  August 7, 2017      Moira Boykin  [address] [place] [id number]      Dear Moira,    We care about your health and have reviewed your health plan including your medical conditions, medications, and lab results.  Based on this review, it is recommended that you follow up regarding the following health topic(s):  -Colon Cancer Screening    We recommend you take the following action(s):  -schedule a COLONOSCOPY to look for colon cancer (due every 10 years or 5 years in higher risk situations.)  Colonoscopies can prevent 90-95% of colon cancer deaths.  Problem lesions can be removed before they ever become cancer.  If you do not wish to do a colonoscopy or cannot afford to do one at this time, there is another option called a Fecal Immunochemical Occult Blood Test (FIT) a take home stool sample kit.  It does not replace the colonoscopy for colorectal cancer screening, but it can detect hidden bleeding in the lower colon.  It does need to be repeated every year and if a positive result is obtained, you would be referred for a colonoscopy.  If you have completed either one of these tests at another facility, please have the records sent to our clinic for our records.     Please call us at the [facility] - [phone] (or use [employer]) to address the above recommendations.     Thank you for trusting [facility] ([facility] and we appreciate the opportunity to serve you.  We look forward to supporting your healthcare needs in the future.    Healthy Regards,    Your Health Care Team  [facility]   No

## 2021-10-17 NOTE — ED STATDOCS - PROGRESS NOTE DETAILS
89y/o F with PMHx of HTN; on Metoprolol presents to the ED c/o right sided flank pain which began 4 days ago. Additional c/o nausea with episodes of vomiting. Pt has been taking Tylenol with initial improvement, none currently. Pt states pain is a steady, gnawing sensation. Pt will be placed in the main ED for complete evaluation by another provider. 89y/o F with PMHx of HTN; on Metoprolol presents to the ED c/o right sided flank pain which began 4 days ago. Additional c/o nausea with episodes of vomiting. Pt has been taking Tylenol with initial improvement, none currently. Pt states pain is a steady, gnawing sensation.   Pt will be placed in the main ED for complete evaluation by another provider.

## 2021-10-17 NOTE — ED PROVIDER NOTE - NSICDXPASTMEDICALHX_GEN_ALL_CORE_FT
PAST MEDICAL HISTORY:  Afib     GERD (gastroesophageal reflux disease)     Hypertension     Pacemaker

## 2021-10-17 NOTE — ED ADULT TRIAGE NOTE - CHIEF COMPLAINT QUOTE
Ambulatory complaining of R flank/back pain x4 days, last Tylenol dose @530p. Patient denies urinary complications but reports one episode of N/V at approx 6p.

## 2021-10-17 NOTE — ED PROVIDER NOTE - OBJECTIVE STATEMENT
89 y/o female with PMHx of A-fib, pacemaker in place presents to ED c/o flank pain. Patient reports intermittent, and progressively worsening right flank pain with associated N/V x4 days. Patient reports she has had kidney stones in the past, unsure if this feels similar.     Denies diarrhea, urinary symptoms

## 2021-10-18 DIAGNOSIS — E87.1 HYPO-OSMOLALITY AND HYPONATREMIA: ICD-10-CM

## 2021-10-18 LAB
ANION GAP SERPL CALC-SCNC: 14 MMOL/L — SIGNIFICANT CHANGE UP (ref 5–17)
ANION GAP SERPL CALC-SCNC: 14 MMOL/L — SIGNIFICANT CHANGE UP (ref 5–17)
ANION GAP SERPL CALC-SCNC: 15 MMOL/L — SIGNIFICANT CHANGE UP (ref 5–17)
ANION GAP SERPL CALC-SCNC: 16 MMOL/L — SIGNIFICANT CHANGE UP (ref 5–17)
BASOPHILS # BLD AUTO: 0.03 K/UL — SIGNIFICANT CHANGE UP (ref 0–0.2)
BASOPHILS NFR BLD AUTO: 0.3 % — SIGNIFICANT CHANGE UP (ref 0–2)
BUN SERPL-MCNC: 10 MG/DL — SIGNIFICANT CHANGE UP (ref 8–20)
BUN SERPL-MCNC: 10.2 MG/DL — SIGNIFICANT CHANGE UP (ref 8–20)
BUN SERPL-MCNC: 12.1 MG/DL — SIGNIFICANT CHANGE UP (ref 8–20)
BUN SERPL-MCNC: 9.3 MG/DL — SIGNIFICANT CHANGE UP (ref 8–20)
CALCIUM SERPL-MCNC: 8.5 MG/DL — LOW (ref 8.6–10.2)
CALCIUM SERPL-MCNC: 8.7 MG/DL — SIGNIFICANT CHANGE UP (ref 8.6–10.2)
CALCIUM SERPL-MCNC: 8.8 MG/DL — SIGNIFICANT CHANGE UP (ref 8.6–10.2)
CALCIUM SERPL-MCNC: 9.1 MG/DL — SIGNIFICANT CHANGE UP (ref 8.6–10.2)
CHLORIDE SERPL-SCNC: 88 MMOL/L — LOW (ref 98–107)
CHLORIDE SERPL-SCNC: 89 MMOL/L — LOW (ref 98–107)
CHLORIDE SERPL-SCNC: 89 MMOL/L — LOW (ref 98–107)
CHLORIDE SERPL-SCNC: 90 MMOL/L — LOW (ref 98–107)
CK SERPL-CCNC: 107 U/L — SIGNIFICANT CHANGE UP (ref 25–170)
CO2 SERPL-SCNC: 20 MMOL/L — LOW (ref 22–29)
CO2 SERPL-SCNC: 21 MMOL/L — LOW (ref 22–29)
CREAT SERPL-MCNC: 0.78 MG/DL — SIGNIFICANT CHANGE UP (ref 0.5–1.3)
CREAT SERPL-MCNC: 0.79 MG/DL — SIGNIFICANT CHANGE UP (ref 0.5–1.3)
CREAT SERPL-MCNC: 0.8 MG/DL — SIGNIFICANT CHANGE UP (ref 0.5–1.3)
CREAT SERPL-MCNC: 0.87 MG/DL — SIGNIFICANT CHANGE UP (ref 0.5–1.3)
EOSINOPHIL # BLD AUTO: 0 K/UL — SIGNIFICANT CHANGE UP (ref 0–0.5)
EOSINOPHIL NFR BLD AUTO: 0 % — SIGNIFICANT CHANGE UP (ref 0–6)
GLUCOSE SERPL-MCNC: 113 MG/DL — HIGH (ref 70–99)
GLUCOSE SERPL-MCNC: 131 MG/DL — HIGH (ref 70–99)
GLUCOSE SERPL-MCNC: 163 MG/DL — HIGH (ref 70–99)
GLUCOSE SERPL-MCNC: 166 MG/DL — HIGH (ref 70–99)
HCT VFR BLD CALC: 35 % — SIGNIFICANT CHANGE UP (ref 34.5–45)
HGB BLD-MCNC: 11.8 G/DL — SIGNIFICANT CHANGE UP (ref 11.5–15.5)
IMM GRANULOCYTES NFR BLD AUTO: 0.5 % — SIGNIFICANT CHANGE UP (ref 0–1.5)
LYMPHOCYTES # BLD AUTO: 0.8 K/UL — LOW (ref 1–3.3)
LYMPHOCYTES # BLD AUTO: 9.1 % — LOW (ref 13–44)
MAGNESIUM SERPL-MCNC: 1.6 MG/DL — SIGNIFICANT CHANGE UP (ref 1.6–2.6)
MCHC RBC-ENTMCNC: 32.9 PG — SIGNIFICANT CHANGE UP (ref 27–34)
MCHC RBC-ENTMCNC: 33.7 GM/DL — SIGNIFICANT CHANGE UP (ref 32–36)
MCV RBC AUTO: 97.5 FL — SIGNIFICANT CHANGE UP (ref 80–100)
MONOCYTES # BLD AUTO: 0.34 K/UL — SIGNIFICANT CHANGE UP (ref 0–0.9)
MONOCYTES NFR BLD AUTO: 3.9 % — SIGNIFICANT CHANGE UP (ref 2–14)
NEUTROPHILS # BLD AUTO: 7.56 K/UL — HIGH (ref 1.8–7.4)
NEUTROPHILS NFR BLD AUTO: 86.2 % — HIGH (ref 43–77)
OSMOLALITY SERPL: 269 MOSMOL/KG — LOW (ref 280–301)
OSMOLALITY UR: 384 MOSM/KG — SIGNIFICANT CHANGE UP (ref 300–1000)
PHOSPHATE SERPL-MCNC: 3 MG/DL — SIGNIFICANT CHANGE UP (ref 2.4–4.7)
PLATELET # BLD AUTO: 128 K/UL — LOW (ref 150–400)
POTASSIUM SERPL-MCNC: 3.7 MMOL/L — SIGNIFICANT CHANGE UP (ref 3.5–5.3)
POTASSIUM SERPL-MCNC: 3.8 MMOL/L — SIGNIFICANT CHANGE UP (ref 3.5–5.3)
POTASSIUM SERPL-MCNC: 3.8 MMOL/L — SIGNIFICANT CHANGE UP (ref 3.5–5.3)
POTASSIUM SERPL-MCNC: 4 MMOL/L — SIGNIFICANT CHANGE UP (ref 3.5–5.3)
POTASSIUM SERPL-SCNC: 3.7 MMOL/L — SIGNIFICANT CHANGE UP (ref 3.5–5.3)
POTASSIUM SERPL-SCNC: 3.8 MMOL/L — SIGNIFICANT CHANGE UP (ref 3.5–5.3)
POTASSIUM SERPL-SCNC: 3.8 MMOL/L — SIGNIFICANT CHANGE UP (ref 3.5–5.3)
POTASSIUM SERPL-SCNC: 4 MMOL/L — SIGNIFICANT CHANGE UP (ref 3.5–5.3)
RAPID RVP RESULT: SIGNIFICANT CHANGE UP
RBC # BLD: 3.59 M/UL — LOW (ref 3.8–5.2)
RBC # FLD: 12.4 % — SIGNIFICANT CHANGE UP (ref 10.3–14.5)
SARS-COV-2 RNA SPEC QL NAA+PROBE: SIGNIFICANT CHANGE UP
SODIUM SERPL-SCNC: 123 MMOL/L — LOW (ref 135–145)
SODIUM SERPL-SCNC: 124 MMOL/L — LOW (ref 135–145)
SODIUM SERPL-SCNC: 124 MMOL/L — LOW (ref 135–145)
SODIUM SERPL-SCNC: 125 MMOL/L — LOW (ref 135–145)
SODIUM UR-SCNC: 107 MMOL/L — SIGNIFICANT CHANGE UP
TROPONIN T SERPL-MCNC: <0.01 NG/ML — SIGNIFICANT CHANGE UP (ref 0–0.06)
WBC # BLD: 8.77 K/UL — SIGNIFICANT CHANGE UP (ref 3.8–10.5)
WBC # FLD AUTO: 8.77 K/UL — SIGNIFICANT CHANGE UP (ref 3.8–10.5)

## 2021-10-18 PROCEDURE — G1004: CPT

## 2021-10-18 PROCEDURE — 74176 CT ABD & PELVIS W/O CONTRAST: CPT | Mod: 26,ME

## 2021-10-18 PROCEDURE — 93010 ELECTROCARDIOGRAM REPORT: CPT

## 2021-10-18 PROCEDURE — 72128 CT CHEST SPINE W/O DYE: CPT | Mod: 26

## 2021-10-18 PROCEDURE — 12345: CPT | Mod: NC

## 2021-10-18 PROCEDURE — 72131 CT LUMBAR SPINE W/O DYE: CPT | Mod: 26

## 2021-10-18 PROCEDURE — 99223 1ST HOSP IP/OBS HIGH 75: CPT

## 2021-10-18 PROCEDURE — 70450 CT HEAD/BRAIN W/O DYE: CPT | Mod: 26,MA

## 2021-10-18 RX ORDER — KETOROLAC TROMETHAMINE 30 MG/ML
30 SYRINGE (ML) INJECTION
Refills: 0 | Status: DISCONTINUED | OUTPATIENT
Start: 2021-10-18 | End: 2021-10-20

## 2021-10-18 RX ORDER — LIDOCAINE 4 G/100G
2 CREAM TOPICAL DAILY
Refills: 0 | Status: DISCONTINUED | OUTPATIENT
Start: 2021-10-18 | End: 2021-10-21

## 2021-10-18 RX ORDER — SODIUM CHLORIDE 9 MG/ML
1000 INJECTION INTRAMUSCULAR; INTRAVENOUS; SUBCUTANEOUS
Refills: 0 | Status: DISCONTINUED | OUTPATIENT
Start: 2021-10-18 | End: 2021-10-18

## 2021-10-18 RX ORDER — MORPHINE SULFATE 50 MG/1
2 CAPSULE, EXTENDED RELEASE ORAL ONCE
Refills: 0 | Status: DISCONTINUED | OUTPATIENT
Start: 2021-10-18 | End: 2021-10-18

## 2021-10-18 RX ORDER — APIXABAN 2.5 MG/1
2.5 TABLET, FILM COATED ORAL EVERY 12 HOURS
Refills: 0 | Status: DISCONTINUED | OUTPATIENT
Start: 2021-10-18 | End: 2021-10-21

## 2021-10-18 RX ORDER — ACETAMINOPHEN 500 MG
650 TABLET ORAL EVERY 6 HOURS
Refills: 0 | Status: DISCONTINUED | OUTPATIENT
Start: 2021-10-18 | End: 2021-10-18

## 2021-10-18 RX ORDER — METOCLOPRAMIDE HCL 10 MG
10 TABLET ORAL ONCE
Refills: 0 | Status: COMPLETED | OUTPATIENT
Start: 2021-10-18 | End: 2021-10-18

## 2021-10-18 RX ORDER — INFLUENZA VIRUS VACCINE 15; 15; 15; 15 UG/.5ML; UG/.5ML; UG/.5ML; UG/.5ML
0.5 SUSPENSION INTRAMUSCULAR ONCE
Refills: 0 | Status: DISCONTINUED | OUTPATIENT
Start: 2021-10-18 | End: 2021-10-21

## 2021-10-18 RX ORDER — LEVOTHYROXINE SODIUM 125 MCG
50 TABLET ORAL DAILY
Refills: 0 | Status: DISCONTINUED | OUTPATIENT
Start: 2021-10-18 | End: 2021-10-21

## 2021-10-18 RX ORDER — ONDANSETRON 8 MG/1
4 TABLET, FILM COATED ORAL EVERY 8 HOURS
Refills: 0 | Status: DISCONTINUED | OUTPATIENT
Start: 2021-10-18 | End: 2021-10-18

## 2021-10-18 RX ORDER — ONDANSETRON 8 MG/1
4 TABLET, FILM COATED ORAL
Refills: 0 | Status: DISCONTINUED | OUTPATIENT
Start: 2021-10-18 | End: 2021-10-21

## 2021-10-18 RX ORDER — GABAPENTIN 400 MG/1
100 CAPSULE ORAL THREE TIMES A DAY
Refills: 0 | Status: DISCONTINUED | OUTPATIENT
Start: 2021-10-18 | End: 2021-10-20

## 2021-10-18 RX ORDER — CYCLOBENZAPRINE HYDROCHLORIDE 10 MG/1
5 TABLET, FILM COATED ORAL THREE TIMES A DAY
Refills: 0 | Status: DISCONTINUED | OUTPATIENT
Start: 2021-10-18 | End: 2021-10-21

## 2021-10-18 RX ORDER — OLANZAPINE 15 MG/1
5 TABLET, FILM COATED ORAL ONCE
Refills: 0 | Status: DISCONTINUED | OUTPATIENT
Start: 2021-10-18 | End: 2021-10-18

## 2021-10-18 RX ORDER — ONDANSETRON 8 MG/1
8 TABLET, FILM COATED ORAL ONCE
Refills: 0 | Status: COMPLETED | OUTPATIENT
Start: 2021-10-18 | End: 2021-10-18

## 2021-10-18 RX ORDER — METOPROLOL TARTRATE 50 MG
25 TABLET ORAL ONCE
Refills: 0 | Status: COMPLETED | OUTPATIENT
Start: 2021-10-18 | End: 2021-10-18

## 2021-10-18 RX ORDER — ACETAMINOPHEN 500 MG
650 TABLET ORAL EVERY 6 HOURS
Refills: 0 | Status: DISCONTINUED | OUTPATIENT
Start: 2021-10-18 | End: 2021-10-21

## 2021-10-18 RX ORDER — SPIRONOLACTONE 25 MG/1
25 TABLET, FILM COATED ORAL DAILY
Refills: 0 | Status: DISCONTINUED | OUTPATIENT
Start: 2021-10-18 | End: 2021-10-21

## 2021-10-18 RX ORDER — TAMSULOSIN HYDROCHLORIDE 0.4 MG/1
0.4 CAPSULE ORAL AT BEDTIME
Refills: 0 | Status: DISCONTINUED | OUTPATIENT
Start: 2021-10-18 | End: 2021-10-18

## 2021-10-18 RX ORDER — CYCLOBENZAPRINE HYDROCHLORIDE 10 MG/1
5 TABLET, FILM COATED ORAL ONCE
Refills: 0 | Status: COMPLETED | OUTPATIENT
Start: 2021-10-18 | End: 2021-10-18

## 2021-10-18 RX ORDER — GABAPENTIN 400 MG/1
0 CAPSULE ORAL
Qty: 0 | Refills: 0 | DISCHARGE

## 2021-10-18 RX ORDER — METOPROLOL TARTRATE 50 MG
50 TABLET ORAL
Refills: 0 | Status: DISCONTINUED | OUTPATIENT
Start: 2021-10-18 | End: 2021-10-21

## 2021-10-18 RX ORDER — SODIUM CHLORIDE 5 G/100ML
1000 INJECTION, SOLUTION INTRAVENOUS
Refills: 0 | Status: DISCONTINUED | OUTPATIENT
Start: 2021-10-18 | End: 2021-10-20

## 2021-10-18 RX ORDER — LANOLIN ALCOHOL/MO/W.PET/CERES
3 CREAM (GRAM) TOPICAL AT BEDTIME
Refills: 0 | Status: DISCONTINUED | OUTPATIENT
Start: 2021-10-18 | End: 2021-10-21

## 2021-10-18 RX ORDER — ONDANSETRON 8 MG/1
4 TABLET, FILM COATED ORAL ONCE
Refills: 0 | Status: COMPLETED | OUTPATIENT
Start: 2021-10-18 | End: 2021-10-18

## 2021-10-18 RX ADMIN — GABAPENTIN 100 MILLIGRAM(S): 400 CAPSULE ORAL at 13:24

## 2021-10-18 RX ADMIN — ONDANSETRON 4 MILLIGRAM(S): 8 TABLET, FILM COATED ORAL at 01:33

## 2021-10-18 RX ADMIN — LIDOCAINE 2 PATCH: 4 CREAM TOPICAL at 23:46

## 2021-10-18 RX ADMIN — LIDOCAINE 2 PATCH: 4 CREAM TOPICAL at 11:09

## 2021-10-18 RX ADMIN — MORPHINE SULFATE 2 MILLIGRAM(S): 50 CAPSULE, EXTENDED RELEASE ORAL at 01:51

## 2021-10-18 RX ADMIN — Medication 25 MILLIGRAM(S): at 02:56

## 2021-10-18 RX ADMIN — SODIUM CHLORIDE 1000 MILLILITER(S): 9 INJECTION INTRAMUSCULAR; INTRAVENOUS; SUBCUTANEOUS at 01:30

## 2021-10-18 RX ADMIN — MORPHINE SULFATE 2 MILLIGRAM(S): 50 CAPSULE, EXTENDED RELEASE ORAL at 01:35

## 2021-10-18 RX ADMIN — SODIUM CHLORIDE 75 MILLILITER(S): 9 INJECTION INTRAMUSCULAR; INTRAVENOUS; SUBCUTANEOUS at 06:30

## 2021-10-18 RX ADMIN — Medication 15 MILLIGRAM(S): at 00:08

## 2021-10-18 RX ADMIN — GABAPENTIN 100 MILLIGRAM(S): 400 CAPSULE ORAL at 21:11

## 2021-10-18 RX ADMIN — ONDANSETRON 8 MILLIGRAM(S): 8 TABLET, FILM COATED ORAL at 06:29

## 2021-10-18 RX ADMIN — Medication 30 MILLIGRAM(S): at 17:36

## 2021-10-18 RX ADMIN — APIXABAN 2.5 MILLIGRAM(S): 2.5 TABLET, FILM COATED ORAL at 17:35

## 2021-10-18 RX ADMIN — Medication 3 MILLIGRAM(S): at 21:10

## 2021-10-18 RX ADMIN — Medication 50 MILLIGRAM(S): at 17:35

## 2021-10-18 RX ADMIN — SPIRONOLACTONE 25 MILLIGRAM(S): 25 TABLET, FILM COATED ORAL at 12:51

## 2021-10-18 RX ADMIN — SODIUM CHLORIDE 40 MILLILITER(S): 5 INJECTION, SOLUTION INTRAVENOUS at 16:33

## 2021-10-18 RX ADMIN — Medication 10 MILLIGRAM(S): at 02:57

## 2021-10-18 RX ADMIN — CYCLOBENZAPRINE HYDROCHLORIDE 5 MILLIGRAM(S): 10 TABLET, FILM COATED ORAL at 12:51

## 2021-10-18 RX ADMIN — LIDOCAINE 2 PATCH: 4 CREAM TOPICAL at 19:43

## 2021-10-18 RX ADMIN — ONDANSETRON 4 MILLIGRAM(S): 8 TABLET, FILM COATED ORAL at 11:08

## 2021-10-18 NOTE — CONSULT NOTE ADULT - ASSESSMENT
1) Hyponatremia  2) Vomiting  3) HTN  4) Renal stone    Would check US renal;  Start 2% saline @ 40cc/hr for 6 hours; recheck BMP ; SIADH in setting of nausea/ ?hypovolemic in setting of poor po+diuretic use+vomiting;  Urine studies will be inaccurate; pt already on NS  Will follow    ana luisa Russell

## 2021-10-18 NOTE — H&P ADULT - HISTORY OF PRESENT ILLNESS
89F with PMHX Asthma, CHF, Afib s/p PPM, HTN, HLD, Hypothyroidism presents to [facility] ER c/o R flank/back pain x4 days. Patient has been taking Tylenol at home without relief. +Associated N/V. Hx Kidney Stones in the past. Denies urinary complaints. No F/C. Patient found to be hyponatremic on labwork. CTAP [facility] with 2mm nonobstructing stone v calcification. Given 1L IVFB NSS in ED. VSS. Pt seen/examined. Suspect some degree of dementia/cognitive impairment and/or medication-induced sedation from multiple doses Morphine in ER. Unable to obtain HPI/ROS at this time.

## 2021-10-18 NOTE — H&P ADULT - NSHPLABSRESULTS_GEN_ALL_CORE
CTH WO: IMPRESSION:  No acute intracranial hemorrhage or mass effect.    CTAP RSH: IMPRESSION:  Stable 2 mm calcification in the right renal pelvis which may represent a vascular calcification versus nonobstructive calculus. No hydroureteronephrosis.

## 2021-10-18 NOTE — CHART NOTE - NSCHARTNOTEFT_GEN_A_CORE
admitted overnight   reviewed h/p, labs/imaging.  d/w family at bedside    back pain, hx back surgery  no dysuria or pain radiating to groin.   no cva tenderness  +nausea      acute back pain/flank pain  hyponatremia,  uses lasix    ct t/l/s  pain control  nephro eval re hypoNa

## 2021-10-18 NOTE — H&P ADULT - ASSESSMENT
ASSESSMENT:  89F with PMHX Asthma, CHF, Afib s/p PPM, HTN, HLD, Hypothyroidism, Recurrent Kidney Stones admitted for R Flank pain 2/2 nonobstructing Kidney stone and Hyponatremia.     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 Compensated. Inhalers PRN.     Med Rec  -Patient unable to provide med rec ovenright. Med list obtained from recent outpt pulm visit. Please confirm med list in AM.

## 2021-10-18 NOTE — CHART NOTE - NSCHARTNOTEFT_GEN_A_CORE
As per H + P,  Suspect some degree of dementia/cognitive impairment  Pt getting dressed and wants to leave  VSS NAD   Zyprexa 5mg IM X1   Continue to monitor As per H + P,  Suspect some degree of dementia/cognitive impairment  Pt getting dressed and wants to leave  VSS NAD   Zyprexa 5mg IM X1 ordered but not needed  Not given  Pt went to bed and went to sleep  Pt sleeping ,appears comfortable  Continue to monitor

## 2021-10-18 NOTE — CONSULT NOTE ADULT - SUBJECTIVE AND OBJECTIVE BOX
[facility] DIVISION OF KIDNEY DISEASES AND HYPERTENSION -- INITIAL CONSULT NOTE  --------------------------------------------------------------------------------  HPI:  89F with PMHX Asthma, CHF, Afib s/p PPM, HTN, HLD, Hypothyroidism presents to [facility] ER c/o R flank/back pain x4 days. Patient has been taking Tylenol at home without relief. +Associated N/V. Hx Kidney Stones in the past. Denies urinary complaints. No F/C. Patient found to be hyponatremic on labwork. CTAP [facility] with 2mm nonobstructing stone v calcification. Given 1L IVFB NSS in ED. VSS. Pt seen/examined. Suspect some degree of dementia/cognitive impairment and/or medication-induced sedation from multiple doses Morphine in ER. Unable to obtain HPI/ROS at this time.   Pt seen/examined ; per daughter has had hyponatremic episodes multiple times in past; hospitaliztion for it in 2007 in [place]; on lasix at home; pt seen/examined vomiting ;      PAST HISTORY  --------------------------------------------------------------------------------  PAST MEDICAL & SURGICAL HISTORY:  Hypertension    Pacemaker    GERD (gastroesophageal reflux disease)    Afib    H/O: hysterectomy    H/O laminectomy  lumbar      FAMILY HISTORY:  Family history of ovarian cancer (Mother)      PAST SOCIAL HISTORY:    ALLERGIES & MEDICATIONS  --------------------------------------------------------------------------------  Allergies    Bactrim (Other)  Demerol HCl (Nausea)  Robaxin (Other)  shellfish (Rash)    Intolerances      Standing Inpatient Medications  acetaminophen   Tablet .. 650 milliGRAM(s) Oral every 6 hours  apixaban 2.5 milliGRAM(s) Oral every 12 hours  gabapentin 100 milliGRAM(s) Oral three times a day  influenza   Vaccine 0.5 milliLiter(s) IntraMuscular once  levothyroxine 50 MICROGram(s) Oral daily  lidocaine   4% Patch 2 Patch Transdermal daily  metoprolol tartrate 50 milliGRAM(s) Oral two times a day  sodium chloride 2% . 1000 milliLiter(s) IV Continuous <Continuous>  spironolactone 25 milliGRAM(s) Oral daily    PRN Inpatient Medications  aluminum hydroxide/magnesium hydroxide/simethicone Suspension 30 milliLiter(s) Oral every 4 hours PRN  cyclobenzaprine 5 milliGRAM(s) Oral three times a day PRN  ketorolac   Injectable 30 milliGRAM(s) IV Push four times a day PRN  melatonin 3 milliGRAM(s) Oral at bedtime PRN  ondansetron Injectable 4 milliGRAM(s) IV Push four times a day PRN      REVIEW OF SYSTEMS  --------------------------------------------------------------------------------  Gen: + fatigue  Skin: No rashes  Head/Eyes/Ears/Mouth: No headache; Normal hearing; Normal vision w/o blurriness; No sinus pain/discomfort, sore throat  Respiratory: No dyspnea, cough, wheezing, hemoptysis  CV: No chest pain, PND, orthopnea  GI: + N/V  : No increased frequency, dysuria, hematuria, nocturia  MSK: No joint pain/swelling; no back pain; no edema  Neuro: No dizziness/lightheadedness, weakness, seizures, numbness, tingling  Heme: No easy bruising or bleeding  Endo: No heat/cold intolerance  Psych: No significant nervousness, anxiety, stress, depression    All other systems were reviewed and are negative, except as noted.    VITALS/PHYSICAL EXAM  --------------------------------------------------------------------------------  T(C): 36.6 (10-18-21 @ 08:18), Max: 36.6 (10-17-21 @ 19:45)  HR: 99 (10-18-21 @ 08:18) (81 - 111)  BP: 130/82 (10-18-21 @ 08:18) (130/82 - 181/103)  RR: 20 (10-18-21 @ 08:18) (17 - 22)  SpO2: 97% (10-18-21 @ 08:18) (96% - 98%)  Wt(kg): --  Height (cm): 172.7 (10-17-21 @ 19:45)  Weight (kg): 60 (10-17-21 @ 19:45)  BMI (kg/m2): 20.1 (10-17-21 @ 19:45)  BSA (m2): 1.71 (10-17-21 @ 19:45)      Physical Exam:  	Gen: NAD   	HEENT: PERRL, supple neck, clear oropharynx  	Pulm: CTA B/L  	CV: RRR, S1S2; no rub  	Back: No spinal or CVA tenderness; no sacral edema  	Abd: +BS, soft, nontender/nondistended  	: No suprapubic tenderness  	UE: Warm, FROM, no clubbing, intact strength; no edema; no asterixis  	LE: Warm, FROM, no clubbing, intact strength; no edema  	Neuro: No focal deficits, intact gait  	Psych: Normal affect and mood  	Skin: Warm, without rashes  	Vascular access:    LABS/STUDIES  --------------------------------------------------------------------------------              11.8   8.77  >-----------<  128      [10-18-21 @ 09:09]              35.0     124  |  88  |  9.3  ----------------------------<  166      [10-18-21 @ 11:52]  3.7   |  20.0  |  0.79        Ca     8.8     [10-18-21 @ 11:52]      Mg     1.6     [10-18-21 @ 09:08]      Phos  3.0     [10-18-21 @ 09:08]    TPro  7.9  /  Alb  4.5  /  TBili  1.0  /  DBili  x   /  AST  44  /  ALT  21  /  AlkPhos  65  [10-17-21 @ 22:00]        Troponin <0.01      [10-18-21 @ 09:08]        [10-18-21 @ 09:08]  Serum Osmolality 269      [10-18-21 @ 09:08]    Creatinine Trend:  SCr 0.79 [10-18 @ 11:52]  SCr 0.78 [10-18 @ 09:08]  SCr 0.80 [10-18 @ 01:45]  SCr 0.85 [10-17 @ 22:00]    Urinalysis - [10-17-21 @ 21:51]      Color Yellow / Appearance Clear / SG 1.010 / pH 7.0      Gluc Negative / Ketone Negative  / Bili Negative / Urobili Negative       Blood Small / Protein 100 / Leuk Est Trace / Nitrite Negative      RBC 0-2 / WBC 0-2 / Hyaline  / Gran  / Sq Epi  / Non Sq Epi Occasional / Bacteria     Urine Sodium 107      [10-18-21 @ 11:40]  Urine Osmolality 384      [10-18-21 @ 11:40]

## 2021-10-18 NOTE — H&P ADULT - NSHPPHYSICALEXAM_GEN_ALL_CORE
Vital Signs Last 24 Hrs  T(C): 36.5 (18 Oct 2021 04:27), Max: 36.6 (17 Oct 2021 19:45)  T(F): 97.7 (18 Oct 2021 04:27), Max: 97.8 (17 Oct 2021 19:45)  HR: 93 (18 Oct 2021 04:27) (81 - 111)  BP: 155/81 (18 Oct 2021 04:27) (155/81 - 181/103)  BP(mean): --  RR: 20 (18 Oct 2021 04:27) (17 - 22)  SpO2: 96% (18 Oct 2021 04:27) (96% - 98%)    Constitutional: Well-appearing, NAD, VSS  Head: NC/AT  Eyes: PERRL, EOMI, anicteric sclera, conjunctiva WNL  ENT: Normal Pharynx, MMM, No tonsillar exudate/erythema  Neck: Supple, Non-tender  Chest: Non-tender, no rashes  Cardio: RRR, s1/s2, no appreciable murmurs/rubs/gallops  Resp: BS CTA bilaterally, no wheezing/rhonchi/rales  Abd: Soft, Non-tender, Non-distended, no rebound/guarding/rigidity  : not examined  Rectal: not examined  MSK: moving all extremities, no motor weakness, full ROM x4  Ext: palpable distal pulses, good capillary refill  Psych: appropriate, cooperative  Neuro: CN II-XII grossly intact, no focal deficits  Skin: Warm/Dry. No rashes.

## 2021-10-19 LAB
ANION GAP SERPL CALC-SCNC: 14 MMOL/L — SIGNIFICANT CHANGE UP (ref 5–17)
BUN SERPL-MCNC: 11.8 MG/DL — SIGNIFICANT CHANGE UP (ref 8–20)
CALCIUM SERPL-MCNC: 8.4 MG/DL — LOW (ref 8.6–10.2)
CHLORIDE SERPL-SCNC: 88 MMOL/L — LOW (ref 98–107)
CO2 SERPL-SCNC: 21 MMOL/L — LOW (ref 22–29)
COVID-19 SPIKE DOMAIN AB INTERP: POSITIVE
COVID-19 SPIKE DOMAIN ANTIBODY RESULT: >250 U/ML — HIGH
CREAT SERPL-MCNC: 0.9 MG/DL — SIGNIFICANT CHANGE UP (ref 0.5–1.3)
GLUCOSE SERPL-MCNC: 88 MG/DL — SIGNIFICANT CHANGE UP (ref 70–99)
POTASSIUM SERPL-MCNC: 3.5 MMOL/L — SIGNIFICANT CHANGE UP (ref 3.5–5.3)
POTASSIUM SERPL-SCNC: 3.5 MMOL/L — SIGNIFICANT CHANGE UP (ref 3.5–5.3)
SARS-COV-2 IGG+IGM SERPL QL IA: >250 U/ML — HIGH
SARS-COV-2 IGG+IGM SERPL QL IA: POSITIVE
SODIUM SERPL-SCNC: 123 MMOL/L — LOW (ref 135–145)
UUN UR-MCNC: 198 MG/DL — SIGNIFICANT CHANGE UP

## 2021-10-19 PROCEDURE — 99233 SBSQ HOSP IP/OBS HIGH 50: CPT

## 2021-10-19 RX ORDER — POTASSIUM CHLORIDE 20 MEQ
20 PACKET (EA) ORAL ONCE
Refills: 0 | Status: COMPLETED | OUTPATIENT
Start: 2021-10-19 | End: 2021-10-19

## 2021-10-19 RX ORDER — KETOROLAC TROMETHAMINE 30 MG/ML
30 SYRINGE (ML) INJECTION ONCE
Refills: 0 | Status: DISCONTINUED | OUTPATIENT
Start: 2021-10-19 | End: 2021-10-19

## 2021-10-19 RX ORDER — SODIUM CHLORIDE 9 MG/ML
1 INJECTION INTRAMUSCULAR; INTRAVENOUS; SUBCUTANEOUS THREE TIMES A DAY
Refills: 0 | Status: DISCONTINUED | OUTPATIENT
Start: 2021-10-19 | End: 2021-10-20

## 2021-10-19 RX ADMIN — GABAPENTIN 100 MILLIGRAM(S): 400 CAPSULE ORAL at 06:00

## 2021-10-19 RX ADMIN — CYCLOBENZAPRINE HYDROCHLORIDE 5 MILLIGRAM(S): 10 TABLET, FILM COATED ORAL at 14:45

## 2021-10-19 RX ADMIN — CYCLOBENZAPRINE HYDROCHLORIDE 5 MILLIGRAM(S): 10 TABLET, FILM COATED ORAL at 21:17

## 2021-10-19 RX ADMIN — SPIRONOLACTONE 25 MILLIGRAM(S): 25 TABLET, FILM COATED ORAL at 05:00

## 2021-10-19 RX ADMIN — Medication 650 MILLIGRAM(S): at 17:33

## 2021-10-19 RX ADMIN — SODIUM CHLORIDE 1 GRAM(S): 9 INJECTION INTRAMUSCULAR; INTRAVENOUS; SUBCUTANEOUS at 14:46

## 2021-10-19 RX ADMIN — Medication 50 MILLIGRAM(S): at 05:00

## 2021-10-19 RX ADMIN — LIDOCAINE 2 PATCH: 4 CREAM TOPICAL at 21:21

## 2021-10-19 RX ADMIN — Medication 30 MILLIGRAM(S): at 15:19

## 2021-10-19 RX ADMIN — APIXABAN 2.5 MILLIGRAM(S): 2.5 TABLET, FILM COATED ORAL at 17:33

## 2021-10-19 RX ADMIN — Medication 30 MILLIGRAM(S): at 18:50

## 2021-10-19 RX ADMIN — LIDOCAINE 2 PATCH: 4 CREAM TOPICAL at 12:08

## 2021-10-19 RX ADMIN — Medication 650 MILLIGRAM(S): at 00:05

## 2021-10-19 RX ADMIN — Medication 30 MILLIGRAM(S): at 14:56

## 2021-10-19 RX ADMIN — Medication 50 MICROGRAM(S): at 06:09

## 2021-10-19 RX ADMIN — ONDANSETRON 4 MILLIGRAM(S): 8 TABLET, FILM COATED ORAL at 14:55

## 2021-10-19 RX ADMIN — Medication 650 MILLIGRAM(S): at 06:07

## 2021-10-19 RX ADMIN — Medication 650 MILLIGRAM(S): at 13:19

## 2021-10-19 RX ADMIN — Medication 20 MILLIEQUIVALENT(S): at 17:33

## 2021-10-19 RX ADMIN — Medication 3 MILLIGRAM(S): at 21:17

## 2021-10-19 RX ADMIN — Medication 650 MILLIGRAM(S): at 12:08

## 2021-10-19 RX ADMIN — Medication 50 MILLIGRAM(S): at 17:33

## 2021-10-19 RX ADMIN — Medication 30 MILLIGRAM(S): at 22:20

## 2021-10-19 RX ADMIN — GABAPENTIN 100 MILLIGRAM(S): 400 CAPSULE ORAL at 14:46

## 2021-10-19 RX ADMIN — APIXABAN 2.5 MILLIGRAM(S): 2.5 TABLET, FILM COATED ORAL at 06:08

## 2021-10-19 RX ADMIN — GABAPENTIN 100 MILLIGRAM(S): 400 CAPSULE ORAL at 21:16

## 2021-10-19 RX ADMIN — SODIUM CHLORIDE 1 GRAM(S): 9 INJECTION INTRAMUSCULAR; INTRAVENOUS; SUBCUTANEOUS at 21:29

## 2021-10-19 RX ADMIN — Medication 650 MILLIGRAM(S): at 23:30

## 2021-10-19 NOTE — PHYSICAL THERAPY INITIAL EVALUATION ADULT - TRANSFER SKILLS, REHAB EVAL
[facility]), Cardiology; Internal Medicine  [address] [address] [place] [address]  Phone: [phone]  Fax: [phone]

## 2021-10-20 LAB
ANION GAP SERPL CALC-SCNC: 13 MMOL/L — SIGNIFICANT CHANGE UP (ref 5–17)
ANION GAP SERPL CALC-SCNC: 17 MMOL/L — SIGNIFICANT CHANGE UP (ref 5–17)
BUN SERPL-MCNC: 19.2 MG/DL — SIGNIFICANT CHANGE UP (ref 8–20)
BUN SERPL-MCNC: 22.7 MG/DL — HIGH (ref 8–20)
CALCIUM SERPL-MCNC: 8.4 MG/DL — LOW (ref 8.6–10.2)
CALCIUM SERPL-MCNC: 8.8 MG/DL — SIGNIFICANT CHANGE UP (ref 8.6–10.2)
CHLORIDE SERPL-SCNC: 89 MMOL/L — LOW (ref 98–107)
CHLORIDE SERPL-SCNC: 95 MMOL/L — LOW (ref 98–107)
CO2 SERPL-SCNC: 20 MMOL/L — LOW (ref 22–29)
CO2 SERPL-SCNC: 20 MMOL/L — LOW (ref 22–29)
CREAT SERPL-MCNC: 0.89 MG/DL — SIGNIFICANT CHANGE UP (ref 0.5–1.3)
CREAT SERPL-MCNC: 0.96 MG/DL — SIGNIFICANT CHANGE UP (ref 0.5–1.3)
GLUCOSE SERPL-MCNC: 107 MG/DL — HIGH (ref 70–99)
GLUCOSE SERPL-MCNC: 122 MG/DL — HIGH (ref 70–99)
MAGNESIUM SERPL-MCNC: 1.6 MG/DL — SIGNIFICANT CHANGE UP (ref 1.6–2.6)
POTASSIUM SERPL-MCNC: 4.1 MMOL/L — SIGNIFICANT CHANGE UP (ref 3.5–5.3)
POTASSIUM SERPL-MCNC: 4.1 MMOL/L — SIGNIFICANT CHANGE UP (ref 3.5–5.3)
POTASSIUM SERPL-SCNC: 4.1 MMOL/L — SIGNIFICANT CHANGE UP (ref 3.5–5.3)
POTASSIUM SERPL-SCNC: 4.1 MMOL/L — SIGNIFICANT CHANGE UP (ref 3.5–5.3)
SODIUM SERPL-SCNC: 125 MMOL/L — LOW (ref 135–145)
SODIUM SERPL-SCNC: 128 MMOL/L — LOW (ref 135–145)
TSH SERPL-MCNC: 1.24 UIU/ML — SIGNIFICANT CHANGE UP (ref 0.27–4.2)

## 2021-10-20 PROCEDURE — 99232 SBSQ HOSP IP/OBS MODERATE 35: CPT

## 2021-10-20 PROCEDURE — 99233 SBSQ HOSP IP/OBS HIGH 50: CPT

## 2021-10-20 RX ORDER — FAMOTIDINE 10 MG/ML
20 INJECTION INTRAVENOUS DAILY
Refills: 0 | Status: DISCONTINUED | OUTPATIENT
Start: 2021-10-20 | End: 2021-10-21

## 2021-10-20 RX ORDER — KETOROLAC TROMETHAMINE 30 MG/ML
15 SYRINGE (ML) INJECTION
Refills: 0 | Status: DISCONTINUED | OUTPATIENT
Start: 2021-10-20 | End: 2021-10-21

## 2021-10-20 RX ORDER — GABAPENTIN 400 MG/1
100 CAPSULE ORAL
Refills: 0 | Status: DISCONTINUED | OUTPATIENT
Start: 2021-10-20 | End: 2021-10-21

## 2021-10-20 RX ORDER — MAGNESIUM SULFATE 500 MG/ML
2 VIAL (ML) INJECTION ONCE
Refills: 0 | Status: COMPLETED | OUTPATIENT
Start: 2021-10-20 | End: 2021-10-20

## 2021-10-20 RX ORDER — SODIUM CHLORIDE 5 G/100ML
1000 INJECTION, SOLUTION INTRAVENOUS
Refills: 0 | Status: DISCONTINUED | OUTPATIENT
Start: 2021-10-20 | End: 2021-10-21

## 2021-10-20 RX ORDER — CELECOXIB 200 MG/1
100 CAPSULE ORAL
Refills: 0 | Status: DISCONTINUED | OUTPATIENT
Start: 2021-10-20 | End: 2021-10-20

## 2021-10-20 RX ORDER — SODIUM CHLORIDE 9 MG/ML
2 INJECTION INTRAMUSCULAR; INTRAVENOUS; SUBCUTANEOUS THREE TIMES A DAY
Refills: 0 | Status: DISCONTINUED | OUTPATIENT
Start: 2021-10-20 | End: 2021-10-21

## 2021-10-20 RX ORDER — GABAPENTIN 400 MG/1
300 CAPSULE ORAL AT BEDTIME
Refills: 0 | Status: DISCONTINUED | OUTPATIENT
Start: 2021-10-20 | End: 2021-10-21

## 2021-10-20 RX ADMIN — CYCLOBENZAPRINE HYDROCHLORIDE 5 MILLIGRAM(S): 10 TABLET, FILM COATED ORAL at 11:27

## 2021-10-20 RX ADMIN — Medication 650 MILLIGRAM(S): at 04:19

## 2021-10-20 RX ADMIN — APIXABAN 2.5 MILLIGRAM(S): 2.5 TABLET, FILM COATED ORAL at 04:19

## 2021-10-20 RX ADMIN — Medication 50 MILLIGRAM(S): at 18:12

## 2021-10-20 RX ADMIN — Medication 50 GRAM(S): at 18:09

## 2021-10-20 RX ADMIN — SODIUM CHLORIDE 1 GRAM(S): 9 INJECTION INTRAMUSCULAR; INTRAVENOUS; SUBCUTANEOUS at 04:19

## 2021-10-20 RX ADMIN — FAMOTIDINE 100 MILLIGRAM(S): 10 INJECTION INTRAVENOUS at 18:08

## 2021-10-20 RX ADMIN — LIDOCAINE 2 PATCH: 4 CREAM TOPICAL at 20:19

## 2021-10-20 RX ADMIN — SODIUM CHLORIDE 2 GRAM(S): 9 INJECTION INTRAMUSCULAR; INTRAVENOUS; SUBCUTANEOUS at 21:34

## 2021-10-20 RX ADMIN — ONDANSETRON 4 MILLIGRAM(S): 8 TABLET, FILM COATED ORAL at 09:23

## 2021-10-20 RX ADMIN — Medication 30 MILLILITER(S): at 11:26

## 2021-10-20 RX ADMIN — CYCLOBENZAPRINE HYDROCHLORIDE 5 MILLIGRAM(S): 10 TABLET, FILM COATED ORAL at 18:08

## 2021-10-20 RX ADMIN — Medication 650 MILLIGRAM(S): at 11:26

## 2021-10-20 RX ADMIN — Medication 650 MILLIGRAM(S): at 18:08

## 2021-10-20 RX ADMIN — GABAPENTIN 100 MILLIGRAM(S): 400 CAPSULE ORAL at 18:08

## 2021-10-20 RX ADMIN — Medication 50 MILLIGRAM(S): at 04:18

## 2021-10-20 RX ADMIN — SPIRONOLACTONE 25 MILLIGRAM(S): 25 TABLET, FILM COATED ORAL at 04:18

## 2021-10-20 RX ADMIN — APIXABAN 2.5 MILLIGRAM(S): 2.5 TABLET, FILM COATED ORAL at 18:08

## 2021-10-20 RX ADMIN — GABAPENTIN 100 MILLIGRAM(S): 400 CAPSULE ORAL at 11:25

## 2021-10-20 RX ADMIN — GABAPENTIN 100 MILLIGRAM(S): 400 CAPSULE ORAL at 04:18

## 2021-10-20 RX ADMIN — LIDOCAINE 2 PATCH: 4 CREAM TOPICAL at 11:25

## 2021-10-20 RX ADMIN — Medication 30 MILLILITER(S): at 18:11

## 2021-10-20 RX ADMIN — Medication 50 MICROGRAM(S): at 04:18

## 2021-10-20 RX ADMIN — SODIUM CHLORIDE 1 GRAM(S): 9 INJECTION INTRAMUSCULAR; INTRAVENOUS; SUBCUTANEOUS at 11:25

## 2021-10-21 ENCOUNTER — TRANSCRIPTION ENCOUNTER (OUTPATIENT)
Age: 86
End: 2021-10-21

## 2021-10-21 VITALS
DIASTOLIC BLOOD PRESSURE: 74 MMHG | RESPIRATION RATE: 18 BRPM | OXYGEN SATURATION: 92 % | SYSTOLIC BLOOD PRESSURE: 134 MMHG | HEART RATE: 75 BPM | TEMPERATURE: 98 F

## 2021-10-21 LAB
ANION GAP SERPL CALC-SCNC: 13 MMOL/L — SIGNIFICANT CHANGE UP (ref 5–17)
BUN SERPL-MCNC: 19.9 MG/DL — SIGNIFICANT CHANGE UP (ref 8–20)
CALCIUM SERPL-MCNC: 8.4 MG/DL — LOW (ref 8.6–10.2)
CHLORIDE SERPL-SCNC: 97 MMOL/L — LOW (ref 98–107)
CO2 SERPL-SCNC: 22 MMOL/L — SIGNIFICANT CHANGE UP (ref 22–29)
CREAT SERPL-MCNC: 0.93 MG/DL — SIGNIFICANT CHANGE UP (ref 0.5–1.3)
GLUCOSE SERPL-MCNC: 92 MG/DL — SIGNIFICANT CHANGE UP (ref 70–99)
MAGNESIUM SERPL-MCNC: 1.8 MG/DL — SIGNIFICANT CHANGE UP (ref 1.6–2.6)
POTASSIUM SERPL-MCNC: 3.9 MMOL/L — SIGNIFICANT CHANGE UP (ref 3.5–5.3)
POTASSIUM SERPL-SCNC: 3.9 MMOL/L — SIGNIFICANT CHANGE UP (ref 3.5–5.3)
SODIUM SERPL-SCNC: 132 MMOL/L — LOW (ref 135–145)

## 2021-10-21 PROCEDURE — 99233 SBSQ HOSP IP/OBS HIGH 50: CPT

## 2021-10-21 PROCEDURE — 99239 HOSP IP/OBS DSCHRG MGMT >30: CPT

## 2021-10-21 RX ORDER — TRAMADOL HYDROCHLORIDE 50 MG/1
50 TABLET ORAL ONCE
Refills: 0 | Status: DISCONTINUED | OUTPATIENT
Start: 2021-10-21 | End: 2021-10-21

## 2021-10-21 RX ORDER — SPIRONOLACTONE 25 MG/1
1 TABLET, FILM COATED ORAL
Qty: 30 | Refills: 0
Start: 2021-10-21 | End: 2021-11-19

## 2021-10-21 RX ORDER — LIDOCAINE 4 G/100G
2 CREAM TOPICAL
Qty: 14 | Refills: 0
Start: 2021-10-21 | End: 2021-11-03

## 2021-10-21 RX ORDER — CYCLOBENZAPRINE HYDROCHLORIDE 10 MG/1
1 TABLET, FILM COATED ORAL
Qty: 42 | Refills: 0
Start: 2021-10-21 | End: 2021-11-03

## 2021-10-21 RX ORDER — FUROSEMIDE 40 MG
1 TABLET ORAL
Qty: 0 | Refills: 0 | DISCHARGE

## 2021-10-21 RX ORDER — GABAPENTIN 400 MG/1
1 CAPSULE ORAL
Qty: 30 | Refills: 0
Start: 2021-10-21 | End: 2021-11-19

## 2021-10-21 RX ORDER — LANOLIN ALCOHOL/MO/W.PET/CERES
1 CREAM (GRAM) TOPICAL
Qty: 0 | Refills: 0 | DISCHARGE
Start: 2021-10-21

## 2021-10-21 RX ORDER — SODIUM CHLORIDE 9 MG/ML
1 INJECTION INTRAMUSCULAR; INTRAVENOUS; SUBCUTANEOUS
Qty: 90 | Refills: 0
Start: 2021-10-21 | End: 2021-11-19

## 2021-10-21 RX ORDER — ACETAMINOPHEN 500 MG
2 TABLET ORAL
Qty: 0 | Refills: 0 | DISCHARGE
Start: 2021-10-21

## 2021-10-21 RX ORDER — SODIUM CHLORIDE 9 MG/ML
2 INJECTION INTRAMUSCULAR; INTRAVENOUS; SUBCUTANEOUS
Qty: 180 | Refills: 0
Start: 2021-10-21 | End: 2021-11-19

## 2021-10-21 RX ORDER — TRAMADOL HYDROCHLORIDE 50 MG/1
1 TABLET ORAL
Qty: 15 | Refills: 0
Start: 2021-10-21 | End: 2021-10-25

## 2021-10-21 RX ORDER — POLYETHYLENE GLYCOL 3350 17 G/17G
17 POWDER, FOR SOLUTION ORAL
Qty: 119 | Refills: 0
Start: 2021-10-21 | End: 2021-10-27

## 2021-10-21 RX ADMIN — Medication 650 MILLIGRAM(S): at 00:39

## 2021-10-21 RX ADMIN — APIXABAN 2.5 MILLIGRAM(S): 2.5 TABLET, FILM COATED ORAL at 05:48

## 2021-10-21 RX ADMIN — GABAPENTIN 300 MILLIGRAM(S): 400 CAPSULE ORAL at 05:49

## 2021-10-21 RX ADMIN — Medication 15 MILLIGRAM(S): at 13:48

## 2021-10-21 RX ADMIN — APIXABAN 2.5 MILLIGRAM(S): 2.5 TABLET, FILM COATED ORAL at 17:36

## 2021-10-21 RX ADMIN — Medication 650 MILLIGRAM(S): at 12:00

## 2021-10-21 RX ADMIN — SODIUM CHLORIDE 2 GRAM(S): 9 INJECTION INTRAMUSCULAR; INTRAVENOUS; SUBCUTANEOUS at 14:51

## 2021-10-21 RX ADMIN — Medication 50 MICROGRAM(S): at 05:48

## 2021-10-21 RX ADMIN — SPIRONOLACTONE 25 MILLIGRAM(S): 25 TABLET, FILM COATED ORAL at 05:48

## 2021-10-21 RX ADMIN — CYCLOBENZAPRINE HYDROCHLORIDE 5 MILLIGRAM(S): 10 TABLET, FILM COATED ORAL at 05:49

## 2021-10-21 RX ADMIN — Medication 15 MILLIGRAM(S): at 12:48

## 2021-10-21 RX ADMIN — Medication 50 MILLIGRAM(S): at 05:48

## 2021-10-21 RX ADMIN — Medication 650 MILLIGRAM(S): at 11:53

## 2021-10-21 RX ADMIN — Medication 650 MILLIGRAM(S): at 17:36

## 2021-10-21 RX ADMIN — LIDOCAINE 2 PATCH: 4 CREAM TOPICAL at 11:53

## 2021-10-21 RX ADMIN — Medication 50 MILLIGRAM(S): at 17:37

## 2021-10-21 RX ADMIN — Medication 650 MILLIGRAM(S): at 05:49

## 2021-10-21 RX ADMIN — Medication 15 MILLIGRAM(S): at 00:39

## 2021-10-21 RX ADMIN — LIDOCAINE 2 PATCH: 4 CREAM TOPICAL at 00:26

## 2021-10-21 RX ADMIN — CYCLOBENZAPRINE HYDROCHLORIDE 5 MILLIGRAM(S): 10 TABLET, FILM COATED ORAL at 14:51

## 2021-10-21 RX ADMIN — SODIUM CHLORIDE 2 GRAM(S): 9 INJECTION INTRAMUSCULAR; INTRAVENOUS; SUBCUTANEOUS at 05:47

## 2021-10-21 RX ADMIN — FAMOTIDINE 100 MILLIGRAM(S): 10 INJECTION INTRAVENOUS at 12:01

## 2021-10-21 NOTE — PROGRESS NOTE ADULT - REASON FOR ADMISSION
Intractable N/V, R Flank Pain, Renal Stone, Hyponatremia

## 2021-10-21 NOTE — DISCHARGE NOTE PROVIDER - NSDCCPCAREPLAN_GEN_ALL_CORE_FT
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

## 2021-10-21 NOTE — DISCHARGE NOTE PROVIDER - NSDCMRMEDTOKEN_GEN_ALL_CORE_FT
acetaminophen 325 mg oral tablet: 2 tab(s) orally every 6 hours, As Needed  apixaban 2.5 mg oral tablet: 1 tab(s) orally 2 times a day  Calcium 600+D 600 mg-5 mcg (200 intl units) oral tablet: 1 tab(s) orally once a day  gabapentin 100 mg oral capsule: 1 cap(s) orally 2 times a day  Lasix 40 mg oral tablet: 1 tab(s) orally once a day  levothyroxine 50 mcg (0.05 mg) oral tablet: 1 tab(s) orally once a day  melatonin 3 mg oral tablet: 1 tab(s) orally once a day (at bedtime), As needed, Insomnia  metoprolol tartrate 50 mg oral tablet: 1 tab(s) orally 2 times a day  Multiple Vitamins oral capsule: 1 cap(s) orally once a day  Vitamin C 500 mg oral tablet: 1 tab(s) orally once a day   acetaminophen 325 mg oral tablet: 2 tab(s) orally every 6 hours, As Needed for mild to moderate pain  apixaban 2.5 mg oral tablet: 1 tab(s) orally 2 times a day  Calcium 600+D 600 mg-5 mcg (200 intl units) oral tablet: 1 tab(s) orally once a day  gabapentin 100 mg oral capsule: 1 cap(s) orally 2 times a day  levothyroxine 50 mcg (0.05 mg) oral tablet: 1 tab(s) orally once a day  melatonin 3 mg oral tablet: 1 tab(s) orally once a day (at bedtime), As needed, Insomnia  metoprolol tartrate 50 mg oral tablet: 1 tab(s) orally 2 times a day  MiraLax oral powder for reconstitution: 17 gram(s) orally once a day, As Needed for constipation   Multiple Vitamins oral capsule: 1 cap(s) orally once a day  traMADol 50 mg oral tablet: 1 tab(s) orally every 8 hours, As Needed for severe pain MDD:3 tabs  Vitamin C 500 mg oral tablet: 1 tab(s) orally once a day   acetaminophen 325 mg oral tablet: 2 tab(s) orally every 6 hours, As Needed for mild to moderate pain  apixaban 2.5 mg oral tablet: 1 tab(s) orally 2 times a day  Calcium 600+D 600 mg-5 mcg (200 intl units) oral tablet: 1 tab(s) orally once a day  cyclobenzaprine 5 mg oral tablet: 1 tab(s) orally 3 times a day, As needed, Muscle Spasm  gabapentin 100 mg oral capsule: 1 cap(s) orally 2 times a day  gabapentin 300 mg oral capsule: 1 cap(s) orally once a day (at bedtime)  levothyroxine 50 mcg (0.05 mg) oral tablet: 1 tab(s) orally once a day  lidocaine 4% topical film: Apply topically to affected area once a day   melatonin 3 mg oral tablet: 1 tab(s) orally once a day (at bedtime), As needed, Insomnia  metoprolol tartrate 50 mg oral tablet: 1 tab(s) orally 2 times a day  MiraLax oral powder for reconstitution: 17 gram(s) orally once a day, As Needed for constipation   Multiple Vitamins oral capsule: 1 cap(s) orally once a day  sodium chloride 1 g oral tablet: 1 tab(s) orally 3 times a day   spironolactone 25 mg oral tablet: 1 tab(s) orally once a day  traMADol 50 mg oral tablet: 1 tab(s) orally every 8 hours, As Needed for severe pain MDD:3 tabs  Vitamin C 500 mg oral tablet: 1 tab(s) orally once a day

## 2021-10-21 NOTE — DISCHARGE NOTE PROVIDER - PROVIDER TOKENS
PROVIDER:[TOKEN:[[id number]]],FREE:[LAST:[PMD],PHONE:[(   )    -],FAX:[(   )    -]] PROVIDER:[TOKEN:[[id number]],FOLLOWUP:[1 week]],PROVIDER:[TOKEN:[[id number]],FOLLOWUP:[1 week]]

## 2021-10-21 NOTE — DISCHARGE NOTE PROVIDER - CARE PROVIDERS DIRECT ADDRESSES
,DirectAddress_Unknown,DirectAddress_Unknown ,DirectAddress_Unknown,[email or web address]

## 2021-10-21 NOTE — PROGRESS NOTE ADULT - SUBJECTIVE AND OBJECTIVE BOX
[facility] DIVISION OF KIDNEY DISEASES AND HYPERTENSION -- FOLLOW UP NOTE  --------------------------------------------------------------------------------  Chief Complaint: hyponatremia    24 hour events/subjective:  no acute event noted      PAST HISTORY  --------------------------------------------------------------------------------  No significant changes to PMH, PSH, FHx, SHx, unless otherwise noted    ALLERGIES & MEDICATIONS  --------------------------------------------------------------------------------  Allergies    Bactrim (Other)  Demerol HCl (Nausea)  Robaxin (Other)  shellfish (Rash)    Intolerances      Standing Inpatient Medications  acetaminophen   Tablet .. 650 milliGRAM(s) Oral every 6 hours  apixaban 2.5 milliGRAM(s) Oral every 12 hours  gabapentin 100 milliGRAM(s) Oral three times a day  influenza   Vaccine 0.5 milliLiter(s) IntraMuscular once  levothyroxine 50 MICROGram(s) Oral daily  lidocaine   4% Patch 2 Patch Transdermal daily  metoprolol tartrate 50 milliGRAM(s) Oral two times a day  potassium chloride    Tablet ER 20 milliEquivalent(s) Oral once  sodium chloride 1 Gram(s) Oral three times a day  sodium chloride 2% . 1000 milliLiter(s) IV Continuous <Continuous>  spironolactone 25 milliGRAM(s) Oral daily    PRN Inpatient Medications  aluminum hydroxide/magnesium hydroxide/simethicone Suspension 30 milliLiter(s) Oral every 4 hours PRN  cyclobenzaprine 5 milliGRAM(s) Oral three times a day PRN  ketorolac   Injectable 30 milliGRAM(s) IV Push four times a day PRN  melatonin 3 milliGRAM(s) Oral at bedtime PRN  ondansetron Injectable 4 milliGRAM(s) IV Push four times a day PRN      REVIEW OF SYSTEMS  --------------------------------------------------------------------------------  Gen: No weight changes, fatigue, fevers/chills, weakness  Skin: No rashes  Head/Eyes/Ears/Mouth: No headache; Normal hearing; Normal vision w/o blurriness; No sinus pain/discomfort, sore throat  Respiratory: No dyspnea, cough, wheezing, hemoptysis  CV: No chest pain, PND, orthopnea  GI: No abdominal pain, diarrhea, constipation, nausea, vomiting, melena, hematochezia  : No increased frequency, dysuria, hematuria, nocturia  MSK: No joint pain/swelling; no back pain; no edema  Neuro: No dizziness/lightheadedness, weakness, seizures, numbness, tingling  Heme: No easy bruising or bleeding  Endo: No heat/cold intolerance  Psych: No significant nervousness, anxiety, stress, depression    All other systems were reviewed and are negative, except as noted.    VITALS/PHYSICAL EXAM  --------------------------------------------------------------------------------  T(C): 36.6 (10-19-21 @ 07:57), Max: 36.9 (10-18-21 @ 21:10)  HR: 64 (10-19-21 @ 07:57) (64 - 102)  BP: 153/74 (10-19-21 @ 07:57) (129/77 - 154/81)  RR: 19 (10-19-21 @ 07:57) (19 - 20)  SpO2: 98% (10-19-21 @ 07:57) (97% - 100%)  Wt(kg): --  Height (cm): 172.7 (10-17-21 @ 19:45)  Weight (kg): 60 (10-17-21 @ 19:45)  BMI (kg/m2): 20.1 (10-17-21 @ 19:45)  BSA (m2): 1.71 (10-17-21 @ 19:45)      10-18-21 @ 07:01  -  10-19-21 @ 07:00  --------------------------------------------------------  IN: 310 mL / OUT: 0 mL / NET: 310 mL      Physical Exam:  	Gen: NAD  	HEENT: PERRL, supple neck, clear oropharynx  	Pulm: CTA B/L  	CV: RRR, S1S2; no rub  	Back: No spinal or CVA tenderness; no sacral edema  	Abd: +BS, soft, nontender/nondistended  	: No suprapubic tenderness  	UE: Warm,  no edema; no asterixis  	LE: Warm, no edema  	Neuro: No focal deficits  	Psych: Normal affect and mood  	Skin: Warm    LABS/STUDIES  --------------------------------------------------------------------------------              11.8   8.77  >-----------<  128      [10-18-21 @ 09:09]              35.0     123  |  88  |  11.8  ----------------------------<  88      [10-19-21 @ 03:55]  3.5   |  21.0  |  0.90        Ca     8.4     [10-19-21 @ 03:55]      Mg     1.6     [10-18-21 @ 09:08]      Phos  3.0     [10-18-21 @ 09:08]    TPro  7.9  /  Alb  4.5  /  TBili  1.0  /  DBili  x   /  AST  44  /  ALT  21  /  AlkPhos  65  [10-17-21 @ 22:00]        Troponin <0.01      [10-18-21 @ 09:08]        [10-18-21 @ 09:08]  Serum Osmolality 269      [10-18-21 @ 09:08]    Creatinine Trend:  SCr 0.90 [10-19 @ 03:55]  SCr 0.87 [10-18 @ 21:37]  SCr 0.79 [10-18 @ 11:52]  SCr 0.78 [10-18 @ 09:08]  SCr 0.80 [10-18 @ 01:45]    Urinalysis - [10-17-21 @ 21:51]      Color Yellow / Appearance Clear / SG 1.010 / pH 7.0      Gluc Negative / Ketone Negative  / Bili Negative / Urobili Negative       Blood Small / Protein 100 / Leuk Est Trace / Nitrite Negative      RBC 0-2 / WBC 0-2 / Hyaline  / Gran  / Sq Epi  / Non Sq Epi Occasional / Bacteria     Urine Sodium 107      [10-18-21 @ 11:40]  Urine Urea Nitrogen 198      [10-18-21 @ 17:26]  Urine Osmolality 384      [10-18-21 @ 11:40]        
  seen for back pain, hypona    no acute complaints  back pain improved  ros negative      daughter notes improvement in pain, appetite.  less confused today    MEDICATIONS  (STANDING):  acetaminophen   Tablet .. 650 milliGRAM(s) Oral every 6 hours  apixaban 2.5 milliGRAM(s) Oral every 12 hours  gabapentin 100 milliGRAM(s) Oral three times a day  influenza   Vaccine 0.5 milliLiter(s) IntraMuscular once  levothyroxine 50 MICROGram(s) Oral daily  lidocaine   4% Patch 2 Patch Transdermal daily  metoprolol tartrate 50 milliGRAM(s) Oral two times a day  sodium chloride 1 Gram(s) Oral three times a day  sodium chloride 2% . 1000 milliLiter(s) (40 mL/Hr) IV Continuous <Continuous>  spironolactone 25 milliGRAM(s) Oral daily    MEDICATIONS  (PRN):  aluminum hydroxide/magnesium hydroxide/simethicone Suspension 30 milliLiter(s) Oral every 4 hours PRN Dyspepsia  cyclobenzaprine 5 milliGRAM(s) Oral three times a day PRN Muscle Spasm  ketorolac   Injectable 30 milliGRAM(s) IV Push four times a day PRN Severe Pain (7 - 10)  melatonin 3 milliGRAM(s) Oral at bedtime PRN Insomnia  ondansetron Injectable 4 milliGRAM(s) IV Push four times a day PRN Nausea and/or Vomiting      Allergies    Bactrim (Other)  Demerol HCl (Nausea)  Robaxin (Other)  shellfish (Rash)      Vital Signs Last 24 Hrs  T(C): 36.6 (19 Oct 2021 07:57), Max: 36.9 (18 Oct 2021 21:10)  T(F): 97.9 (19 Oct 2021 07:57), Max: 98.4 (18 Oct 2021 21:10)  HR: 64 (19 Oct 2021 07:57) (64 - 102)  BP: 153/74 (19 Oct 2021 07:57) (129/77 - 154/81)  BP(mean): --  RR: 19 (19 Oct 2021 07:57) (19 - 20)  SpO2: 98% (19 Oct 2021 07:57) (97% - 100%)    PHYSICAL EXAM:    GENERAL: NAD  CHEST/LUNG: Clear to percussion bilaterally  HEART: Regular rate and rhythm; S1 S2  ABDOMEN: Soft, Nontender, Bowel sounds present  EXTREMITIES:  no edema  no vertebral TTP  NERVOUS SYSTEM:  Alert & Oriented X2, mildly confused, nonfocal, right posterior chest wall tenderness      LABS:                        11.8   8.77  )-----------( 128      ( 18 Oct 2021 09:09 )             35.0     10-19    123<L>  |  88<L>  |  11.8  ----------------------------<  88  3.5   |  21.0<L>  |  0.90    Ca    8.4<L>      19 Oct 2021 03:55  Phos  3.0     10-18  Mg     1.6     10-18    TPro  7.9  /  Alb  4.5  /  TBili  1.0  /  DBili  x   /  AST  44<H>  /  ALT  21  /  AlkPhos  65  10-17      Urinalysis Basic - ( 17 Oct 2021 21:51 )    Color: Yellow / Appearance: Clear / S.010 / pH: x  Gluc: x / Ketone: Negative  / Bili: Negative / Urobili: Negative mg/dL   Blood: x / Protein: 100 mg/dL / Nitrite: Negative   Leuk Esterase: Trace / RBC: 0-2 /HPF / WBC 0-2   Sq Epi: x / Non Sq Epi: Occasional / Bacteria: x        CAPILLARY BLOOD GLUCOSE            RADIOLOGY & ADDITIONAL TESTS:  
    seen for back pain    complaining of diffuse back pain, nausea and poor po intake    ros limited due to dementia    MEDICATIONS  (STANDING):  acetaminophen   Tablet .. 650 milliGRAM(s) Oral every 6 hours  apixaban 2.5 milliGRAM(s) Oral every 12 hours  celecoxib 100 milliGRAM(s) Oral two times a day  famotidine  IVPB 20 milliGRAM(s) IV Intermittent daily  gabapentin 300 milliGRAM(s) Oral at bedtime  gabapentin 100 milliGRAM(s) Oral two times a day  influenza   Vaccine 0.5 milliLiter(s) IntraMuscular once  levothyroxine 50 MICROGram(s) Oral daily  lidocaine   4% Patch 2 Patch Transdermal daily  magnesium sulfate  IVPB 2 Gram(s) IV Intermittent once  metoprolol tartrate 50 milliGRAM(s) Oral two times a day  sodium chloride 1 Gram(s) Oral three times a day  sodium chloride 2% . 1000 milliLiter(s) (50 mL/Hr) IV Continuous <Continuous>  spironolactone 25 milliGRAM(s) Oral daily    MEDICATIONS  (PRN):  aluminum hydroxide/magnesium hydroxide/simethicone Suspension 30 milliLiter(s) Oral every 4 hours PRN Dyspepsia  cyclobenzaprine 5 milliGRAM(s) Oral three times a day PRN Muscle Spasm  melatonin 3 milliGRAM(s) Oral at bedtime PRN Insomnia  ondansetron Injectable 4 milliGRAM(s) IV Push four times a day PRN Nausea and/or Vomiting      Allergies    Bactrim (Other)  Demerol HCl (Nausea)  Robaxin (Other)  shellfish (Rash)      Vital Signs Last 24 Hrs  T(C): 36.4 (20 Oct 2021 04:17), Max: 36.6 (19 Oct 2021 15:52)  T(F): 97.5 (20 Oct 2021 04:17), Max: 97.9 (19 Oct 2021 20:25)  HR: 95 (20 Oct 2021 04:17) (72 - 95)  BP: 169/89 (20 Oct 2021 04:17) (134/79 - 169/89)  BP(mean): --  RR: 18 (20 Oct 2021 04:17) (18 - 18)  SpO2: 97% (20 Oct 2021 04:17) (97% - 97%)    PHYSICAL EXAM:    GENERAL: NAD  CHEST/LUNG: Clear to percussion bilaterally  HEART: Regular rate and rhythm; S1 S2  ABDOMEN: Soft, Nontender,  Bowel sounds present  EXTREMITIES:  no edema   NERVOUS SYSTEM:  Alert & Oriented X1-2 nonfocal. no point tenderness of spine/ribs.  diffuse back pain      LABS:    10-20    125<L>  |  89<L>  |  19.2  ----------------------------<  107<H>  4.1   |  20.0<L>  |  0.89    Ca    8.4<L>      20 Oct 2021 07:24  Mg     1.6     10-20            CAPILLARY BLOOD GLUCOSE            RADIOLOGY & ADDITIONAL TESTS:  
[facility] DIVISION OF KIDNEY DISEASES AND HYPERTENSION -- FOLLOW UP NOTE  --------------------------------------------------------------------------------  Chief Complaint:  Hyponatremia    24 hour events/subjective:  Na 125 today      PAST HISTORY  --------------------------------------------------------------------------------  No significant changes to PMH, PSH, FHx, SHx, unless otherwise noted    ALLERGIES & MEDICATIONS  --------------------------------------------------------------------------------  Allergies    Bactrim (Other)  Demerol HCl (Nausea)  Robaxin (Other)  shellfish (Rash)    Intolerances      Standing Inpatient Medications  acetaminophen   Tablet .. 650 milliGRAM(s) Oral every 6 hours  apixaban 2.5 milliGRAM(s) Oral every 12 hours  famotidine  IVPB 20 milliGRAM(s) IV Intermittent daily  gabapentin 300 milliGRAM(s) Oral at bedtime  gabapentin 100 milliGRAM(s) Oral two times a day  influenza   Vaccine 0.5 milliLiter(s) IntraMuscular once  levothyroxine 50 MICROGram(s) Oral daily  lidocaine   4% Patch 2 Patch Transdermal daily  magnesium sulfate  IVPB 2 Gram(s) IV Intermittent once  metoprolol tartrate 50 milliGRAM(s) Oral two times a day  sodium chloride 1 Gram(s) Oral three times a day  sodium chloride 2% . 1000 milliLiter(s) IV Continuous <Continuous>  spironolactone 25 milliGRAM(s) Oral daily    PRN Inpatient Medications  aluminum hydroxide/magnesium hydroxide/simethicone Suspension 30 milliLiter(s) Oral every 4 hours PRN  cyclobenzaprine 5 milliGRAM(s) Oral three times a day PRN  ketorolac   Injectable 15 milliGRAM(s) IV Push four times a day PRN  melatonin 3 milliGRAM(s) Oral at bedtime PRN  ondansetron Injectable 4 milliGRAM(s) IV Push four times a day PRN      REVIEW OF SYSTEMS  --------------------------------------------------------------------------------  Unable to obtain    VITALS/PHYSICAL EXAM  --------------------------------------------------------------------------------  T(C): 36.4 (10-20-21 @ 04:17), Max: 36.6 (10-19-21 @ 15:52)  HR: 95 (10-20-21 @ 04:17) (72 - 95)  BP: 169/89 (10-20-21 @ 04:17) (134/79 - 169/89)  RR: 18 (10-20-21 @ 04:17) (18 - 18)  SpO2: 97% (10-20-21 @ 04:17) (97% - 97%)  Wt(kg): --        Physical Exam:  	Gen: NAD  	HEENT: PERRL, supple neck, clear oropharynx  	Pulm: CTA B/L  	CV: RRR, S1S2; no rub  	Back: No spinal or CVA tenderness; no sacral edema  	Abd: +BS, soft, nontender/nondistended  	: No suprapubic tenderness  	UE: Warm,  no edema; no asterixis  	LE: Warm, no edema  	Neuro: No focal deficits  	Psych: Normal affect and mood  	Skin: Warm    LABS/STUDIES  --------------------------------------------------------------------------------    125  |  89  |  19.2  ----------------------------<  107      [10-20-21 @ 07:24]  4.1   |  20.0  |  0.89        Ca     8.4     [10-20-21 @ 07:24]      Mg     1.6     [10-20-21 @ 07:24]            Creatinine Trend:  SCr 0.89 [10-20 @ 07:24]  SCr 0.90 [10-19 @ 03:55]  SCr 0.87 [10-18 @ 21:37]  SCr 0.79 [10-18 @ 11:52]  SCr 0.78 [10-18 @ 09:08]    Urinalysis - [10-17-21 @ 21:51]      Color Yellow / Appearance Clear / SG 1.010 / pH 7.0      Gluc Negative / Ketone Negative  / Bili Negative / Urobili Negative       Blood Small / Protein 100 / Leuk Est Trace / Nitrite Negative      RBC 0-2 / WBC 0-2 / Hyaline  / Gran  / Sq Epi  / Non Sq Epi Occasional / Bacteria     Urine Sodium 107      [10-18-21 @ 11:40]  Urine Urea Nitrogen 198      [10-18-21 @ 17:26]  Urine Osmolality 384      [10-18-21 @ 11:40]    TSH 1.24      [10-20-21 @ 07:24]      
[facility] DIVISION OF KIDNEY DISEASES AND HYPERTENSION -- FOLLOW UP NOTE  --------------------------------------------------------------------------------  Chief Complaint: hyponatremia    24 hour events/subjective:  pt seen and examined; feels well      PAST HISTORY  --------------------------------------------------------------------------------  No significant changes to PMH, PSH, FHx, SHx, unless otherwise noted    ALLERGIES & MEDICATIONS  --------------------------------------------------------------------------------  Allergies    Bactrim (Other)  Demerol HCl (Nausea)  Robaxin (Other)  shellfish (Rash)          Standing Inpatient Medications  acetaminophen   Tablet .. 650 milliGRAM(s) Oral every 6 hours  apixaban 2.5 milliGRAM(s) Oral every 12 hours  famotidine  IVPB 20 milliGRAM(s) IV Intermittent daily  gabapentin 300 milliGRAM(s) Oral at bedtime  gabapentin 100 milliGRAM(s) Oral two times a day  influenza   Vaccine 0.5 milliLiter(s) IntraMuscular once  levothyroxine 50 MICROGram(s) Oral daily  lidocaine   4% Patch 2 Patch Transdermal daily  metoprolol tartrate 50 milliGRAM(s) Oral two times a day  sodium chloride 2 Gram(s) Oral three times a day  sodium chloride 2% . 1000 milliLiter(s) IV Continuous <Continuous>  spironolactone 25 milliGRAM(s) Oral daily    PRN Inpatient Medications  aluminum hydroxide/magnesium hydroxide/simethicone Suspension 30 milliLiter(s) Oral every 4 hours PRN  cyclobenzaprine 5 milliGRAM(s) Oral three times a day PRN  ketorolac   Injectable 15 milliGRAM(s) IV Push four times a day PRN  melatonin 3 milliGRAM(s) Oral at bedtime PRN  ondansetron Injectable 4 milliGRAM(s) IV Push four times a day PRN      REVIEW OF SYSTEMS  --------------------------------------------------------------------------------  Gen: No weight changes, fatigue, fevers/chills, weakness  Skin: No rashes  Head/Eyes/Ears/Mouth: No headache; Normal hearing; Normal vision w/o blurriness; No sinus pain/discomfort, sore throat  Respiratory: No dyspnea, cough, wheezing, hemoptysis  CV: No chest pain, PND, orthopnea  GI: No abdominal pain, diarrhea, constipation, nausea, vomiting, melena, hematochezia  : No increased frequency, dysuria, hematuria, nocturia  MSK: No joint pain/swelling; no back pain; no edema  Neuro: No dizziness/lightheadedness, weakness, seizures, numbness, tingling  Heme: No easy bruising or bleeding  Endo: No heat/cold intolerance  Psych: No significant nervousness, anxiety, stress, depression    All other systems were reviewed and are negative, except as noted.    VITALS/PHYSICAL EXAM  --------------------------------------------------------------------------------  T(C): 36.6 (10-21-21 @ 10:35), Max: 37.2 (10-20-21 @ 22:08)  HR: 79 (10-21-21 @ 10:35) (75 - 80)  BP: 121/78 (10-21-21 @ 10:35) (121/78 - 133/64)  RR: 18 (10-21-21 @ 10:35) (18 - 20)  SpO2: 93% (10-21-21 @ 10:35) (93% - 98%)  Wt(kg): --        Physical Exam:  	Gen: NAD, well-appearing  	HEENT: PERRL, supple neck, clear oropharynx  	Pulm: CTA B/L  	CV: RRR, S1S2; no rub  	Back: No spinal or CVA tenderness; no sacral edema  	Abd: +BS, soft, nontender/nondistended  	: No suprapubic tenderness  	UE: Warm, no edema  	LE: Warm,  no edema  	Neuro: No focal deficits  	Psych: Normal affect and mood  	Skin: Warm    LABS/STUDIES  --------------------------------------------------------------------------------    132  |  97  |  19.9  ----------------------------<  92      [10-21-21 @ 07:04]  3.9   |  22.0  |  0.93        Ca     8.4     [10-21-21 @ 07:04]      Mg     1.8     [10-21-21 @ 07:04]      Creatinine Trend:  SCr 0.93 [10-21 @ 07:04]  SCr 0.96 [10-20 @ 22:08]  SCr 0.89 [10-20 @ 07:24]  SCr 0.90 [10-19 @ 03:55]  SCr 0.87 [10-18 @ 21:37]    Urinalysis - [10-17-21 @ 21:51]      Color Yellow / Appearance Clear / SG 1.010 / pH 7.0      Gluc Negative / Ketone Negative  / Bili Negative / Urobili Negative       Blood Small / Protein 100 / Leuk Est Trace / Nitrite Negative      RBC 0-2 / WBC 0-2 / Hyaline  / Gran  / Sq Epi  / Non Sq Epi Occasional / Bacteria     Urine Sodium 107      [10-18-21 @ 11:40]  Urine Urea Nitrogen 198      [10-18-21 @ 17:26]  Urine Osmolality 384      [10-18-21 @ 11:40]    TSH 1.24      [10-20-21 @ 07:24]

## 2021-10-21 NOTE — DISCHARGE NOTE PROVIDER - HOSPITAL COURSE
89F with PMHX Asthma, CHF, Afib s/p PPM, HTN, HLD, Hypothyroidism, Recurrent Kidney Stones admitted for back pain and hyponatremia.  Pain likely musculoskeletal doubt renal stone is responsible CT renal stone hunt reviewed. Hyponatremia being managed by renal.   CT T/L/S with degenerative changes, mod spinal stenosis at L3-L4,  c/w flexeril/ lidocaine patch and atc tylenol with increase gabapentin to 300mg qhs and c/w 100mg BID. Sodium now 132 after 2% saline. Lasix on hold. Aldactone started. The patient was seen by PT and recommend home. The patient is medically stable for discharge.          89F with PMHX Asthma, CHF, Afib s/p PPM, HTN, HLD, Hypothyroidism, Recurrent Kidney Stones admitted for back pain and hyponatremia.  Pain likely musculoskeletal doubt renal stone is responsible CT renal stone hunt reviewed. Hyponatremia being managed by renal.   CT T/L/S with degenerative changes, mod spinal stenosis at L3-L4,  c/w flexeril/ lidocaine patch and atc tylenol with increase gabapentin to 300mg qhs and c/w 100mg BID. Sodium now 132 after 2% saline. Lasix on hold. Aldactone started. The patient was seen by PT and recommend home. The patient is medically stable for discharge.     PHYSICAL EXAM:  Vital Signs   T(C): 36.6 (21 Oct 2021 10:35), Max: 37.2 (20 Oct 2021 22:08)  T(F): 97.9 (21 Oct 2021 10:35), Max: 99 (20 Oct 2021 22:08)  HR: 79 (21 Oct 2021 10:35) (75 - 80)  BP: 121/78 (21 Oct 2021 10:35) (121/78 - 133/64)  RR: 18 (21 Oct 2021 10:35) (18 - 20)  SpO2: 93% (21 Oct 2021 10:35) (93% - 98%)  General: Elderly female sitting in bed comfortably. No acute distress  HEENT: EOMI. Clear conjunctivae. Moist mucus membrane  Neck: Supple.  Chest: CTA bilaterally - no wheezing, rales or rhonchi.   Heart: Normal S1 & S2 with RRR.   Abdomen: Soft. Non-tender. Non-distended. + BS  Ext: No pedal edema. No calf tenderness   Neuro: Active and alert. No focal deficit. No speech disorder.  Skin: Warm and Dry  Psychiatry: Normal mood and affect    Time spent: 42 minutes

## 2021-10-21 NOTE — DISCHARGE NOTE PROVIDER - NSDCFUADDAPPT_GEN_ALL_CORE_FT
Follow up with PMD / Renal in 1 week.  Follow up with Orthopedics in 1-2 weeks.    Repeat BMP in 3 days.

## 2021-10-21 NOTE — DISCHARGE NOTE PROVIDER - CARE PROVIDER_API CALL
Erik Oneill)  Internal Medicine; Nephrology  [address] [place] [id number]  Phone: [phone]  Fax: [phone]  Follow Up Time:     PMD,   Phone: (   )    -  Fax: (   )    -  Follow Up Time:    Sheyla Martínez  FAMILY MEDICINE  [address] [facility], [address]  [place] [address]  Phone: [phone]  Fax: [phone]  Follow Up Time: 1 week    Sorin Cruz (DO)  Internal Medicine  [address] [facility] [facility] [address], [address] [place] [address]  Phone: [phone]  Fax: [phone]  Follow Up Time: 1 week

## 2021-10-21 NOTE — PROGRESS NOTE ADULT - ASSESSMENT
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
89F with PMHX Asthma, CHF, Afib s/p PPM, HTN, HLD, Hypothyroidism, Recurrent Kidney Stones admitted for back pain and hyponatremia    back pain/rib pain --MSK likely, doubt renal stone as may be chronic calcification    CT T/L/S with degenerative changes, mod spinal stenosis at L4-L5    c/w flexeril/ lidocaine patch and atc tylenol    hyponatremia: likely combination of siadh and hypovolemia    s/p 2% saline and normal saline.    add nacl tabs    renal following    check tsh      AFIB/AFlutter s/p PPM  -Eliquis 2.5mg PO BID  -Metoprolol Tartate 50mg PO BID  -Lasix 40mg PO q24 on hold   change lasix to aldactone.    Hypothyroidism  -Levothyroxine     Asthma  -Stable. Compensated. Inhalers PRN.       updated daughter over the phone  dc home when Na 130 or higher .
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    
89F with PMHX Asthma, CHF, Afib s/p PPM, HTN, HLD, Hypothyroidism, Recurrent Kidney Stones admitted for back pain and hyponatremia.  pain likely musculoskeletal doubt renal stone is responsible. hyponatremia being managed by renal. dc once NA>130    back pain/rib pain --MSK likely, doubt renal stone as may be chronic calcification    CT T/L/S with degenerative changes, mod spinal stenosis at L3-L4    c/w flexeril/ lidocaine patch and atc tylenol    increase gabapentin to 300mg qhs and c/w 100mg BID    prn ketorolac    hyponatremia: likely combination of siadh and hypovolemia    s/p 2% saline and normal saline.    add nacl tabs    renal following    tsh wnl       AFIB/AFlutter s/p PPM  -Eliquis 2.5mg PO BID  -Metoprolol Tartate 50mg PO BID  -Lasix 40mg PO q24 on hold   change lasix to aldactone.    Hypothyroidism  -Levothyroxine     Asthma  -Stable. Compensated. Inhalers PRN.       updated daughter over the phone  dc home when Na 130 or higher .

## 2021-10-21 NOTE — DISCHARGE NOTE NURSING/CASE MANAGEMENT/SOCIAL WORK - PATIENT PORTAL LINK FT
You can access the FollowMyHealth Patient Portal offered by [facility] by registering at the following website: [email or web address]. By joining [employer]’s FollowMyHealth portal, you will also be able to view your health information using other applications (apps) compatible with our system.

## 2021-10-25 ENCOUNTER — APPOINTMENT (OUTPATIENT)
Dept: ORTHOPEDIC SURGERY | Facility: CLINIC | Age: 86
End: 2021-10-25
Payer: MEDICARE

## 2021-10-25 VITALS
BODY MASS INDEX: 21.99 KG/M2 | DIASTOLIC BLOOD PRESSURE: 71 MMHG | HEIGHT: 65 IN | HEART RATE: 72 BPM | WEIGHT: 132 LBS | SYSTOLIC BLOOD PRESSURE: 125 MMHG

## 2021-10-25 DIAGNOSIS — M81.0 AGE-RELATED OSTEOPOROSIS W/OUT CURRENT PATHOLOGICAL FRACTURE: ICD-10-CM

## 2021-10-25 DIAGNOSIS — M48.062 SPINAL STENOSIS, LUMBAR REGION WITH NEUROGENIC CLAUDICATION: ICD-10-CM

## 2021-10-25 PROCEDURE — 99204 OFFICE O/P NEW MOD 45 MIN: CPT

## 2021-10-25 RX ORDER — CYCLOBENZAPRINE HYDROCHLORIDE 5 MG/1
5 TABLET, FILM COATED ORAL
Qty: 42 | Refills: 0 | Status: ACTIVE | COMMUNITY
Start: 2021-10-21

## 2021-10-25 RX ORDER — SPIRONOLACTONE 25 MG/1
25 TABLET ORAL
Qty: 30 | Refills: 0 | Status: ACTIVE | COMMUNITY
Start: 2021-10-21

## 2021-10-25 RX ORDER — TRAMADOL HYDROCHLORIDE 50 MG/1
50 TABLET, COATED ORAL
Qty: 15 | Refills: 0 | Status: ACTIVE | COMMUNITY
Start: 2021-10-21

## 2021-10-26 RX ORDER — METHYLPREDNISOLONE 4 MG/1
4 TABLET ORAL
Qty: 1 | Refills: 0 | Status: ACTIVE | COMMUNITY
Start: 2021-10-26 | End: 1900-01-01

## 2021-10-27 NOTE — DISCUSSION/SUMMARY
[Medication Risks Reviewed] : Medication risks reviewed [de-identified] : Acetaminophen/Tylenol every 6-8 hours 650 mg p.r.n. pain. Heat, ice, topicals p.r.n. Physical therapy for decrease pain and increase function. Followup in 2-3 months or p.r.n.  medrol dosepak for anti inflammatory properties.\par Patient does have history of cervical Stenosis. We'll order physical therapy, avoid hyperextension. Repeat cervical Studies next visit.  Recommend follow up with PCP regarding osteoporosis for bone density testing, calcium and vitamin D.

## 2021-10-27 NOTE — PHYSICAL EXAM
[de-identified] : Lumbar exam:\par CONSTITUTIONAL: The patient is a very pleasant individual who is well-nourished and who appears stated age.\par PSYCHIATRIC: The patient is alert and oriented X 3 and in no apparent distress, and participates with orthopedic evaluation well.\par HEAD: Atraumatic and is nonsyndromic in appearance.\par EENT: No visible thyromegaly, EOMI.\par RESPIRATORY: Respiratory rate is regular, not dyspneic on examination.\par LYMPHATICS: There is no inguinal lymphadenopathy\par INTEGUMENTARY: Skin is clean, dry, and intact about the bilateral lower extremities and lumbar spine.\par VASCULAR: There is brisk capillary refill about the bilateral lower extremities.\par NEUROLOGIC: There are no pathologic reflexes. There is no decrease in sensation of the bilateral lower extremities on Wartenberg pinwheel/ manual examination. Deep tendon reflexes are well maintained at 2+/4 of the bilateral lower extremities and are symmetric..\par MUSCULOSKELETAL: There is no visible muscular atrophy. Manual motor strength is well maintained in the bilateral lower extremities. Range of motion of lumbar spine is well maintained. The patient ambulates in a non-myelopathic manner. Negative tension sign and straight leg raise bilaterally. Quad extension, ankle dorsiflexion, EHL, plantar flexion, and ankle eversion are well preserved. Normal secondary orthopaedic exam of bilateral hips, greater trochanteric area, knees and ankles\par  [de-identified] : CT scan [facility] done October 18, 2021 demonstrate Prior L2 and L3 laminectomy, L3-L4 spinal canal stenosis Facet hypertrophy and anterolisthesis at L3 L4

## 2021-11-04 ENCOUNTER — APPOINTMENT (OUTPATIENT)
Dept: ORTHOPEDIC SURGERY | Facility: CLINIC | Age: 86
End: 2021-11-04

## 2021-12-30 PROCEDURE — 72128 CT CHEST SPINE W/O DYE: CPT

## 2021-12-30 PROCEDURE — 85025 COMPLETE CBC W/AUTO DIFF WBC: CPT

## 2021-12-30 PROCEDURE — 84300 ASSAY OF URINE SODIUM: CPT

## 2021-12-30 PROCEDURE — 84443 ASSAY THYROID STIM HORMONE: CPT

## 2021-12-30 PROCEDURE — 72131 CT LUMBAR SPINE W/O DYE: CPT

## 2021-12-30 PROCEDURE — 36415 COLL VENOUS BLD VENIPUNCTURE: CPT

## 2021-12-30 PROCEDURE — 97530 THERAPEUTIC ACTIVITIES: CPT

## 2021-12-30 PROCEDURE — 96375 TX/PRO/DX INJ NEW DRUG ADDON: CPT

## 2021-12-30 PROCEDURE — 83935 ASSAY OF URINE OSMOLALITY: CPT

## 2021-12-30 PROCEDURE — 83930 ASSAY OF BLOOD OSMOLALITY: CPT

## 2021-12-30 PROCEDURE — 81001 URINALYSIS AUTO W/SCOPE: CPT

## 2021-12-30 PROCEDURE — G1004: CPT

## 2021-12-30 PROCEDURE — 82550 ASSAY OF CK (CPK): CPT

## 2021-12-30 PROCEDURE — 93005 ELECTROCARDIOGRAM TRACING: CPT

## 2021-12-30 PROCEDURE — 74176 CT ABD & PELVIS W/O CONTRAST: CPT | Mod: ME

## 2021-12-30 PROCEDURE — 84100 ASSAY OF PHOSPHORUS: CPT

## 2021-12-30 PROCEDURE — 97116 GAIT TRAINING THERAPY: CPT

## 2021-12-30 PROCEDURE — 70450 CT HEAD/BRAIN W/O DYE: CPT | Mod: MA

## 2021-12-30 PROCEDURE — 84484 ASSAY OF TROPONIN QUANT: CPT

## 2021-12-30 PROCEDURE — 99285 EMERGENCY DEPT VISIT HI MDM: CPT

## 2021-12-30 PROCEDURE — 80048 BASIC METABOLIC PNL TOTAL CA: CPT

## 2021-12-30 PROCEDURE — 96374 THER/PROPH/DIAG INJ IV PUSH: CPT

## 2021-12-30 PROCEDURE — 80053 COMPREHEN METABOLIC PANEL: CPT

## 2021-12-30 PROCEDURE — 83605 ASSAY OF LACTIC ACID: CPT

## 2021-12-30 PROCEDURE — 86769 SARS-COV-2 COVID-19 ANTIBODY: CPT

## 2021-12-30 PROCEDURE — 97163 PT EVAL HIGH COMPLEX 45 MIN: CPT

## 2021-12-30 PROCEDURE — 0225U NFCT DS DNA&RNA 21 SARSCOV2: CPT

## 2021-12-30 PROCEDURE — 84540 ASSAY OF URINE/UREA-N: CPT

## 2021-12-30 PROCEDURE — 83735 ASSAY OF MAGNESIUM: CPT

## 2022-03-08 ENCOUNTER — APPOINTMENT (OUTPATIENT)
Dept: ORTHOPEDIC SURGERY | Facility: CLINIC | Age: 87
End: 2022-03-08
Payer: MEDICARE

## 2022-03-08 VITALS
HEIGHT: 65 IN | HEART RATE: 78 BPM | BODY MASS INDEX: 21.99 KG/M2 | WEIGHT: 132 LBS | DIASTOLIC BLOOD PRESSURE: 75 MMHG | SYSTOLIC BLOOD PRESSURE: 137 MMHG

## 2022-03-08 DIAGNOSIS — M47.812 SPONDYLOSIS W/OUT MYELOPATHY OR RADICULOPATHY, CERVICAL REGION: ICD-10-CM

## 2022-03-08 DIAGNOSIS — M47.816 SPONDYLOSIS W/OUT MYELOPATHY OR RADICULOPATHY, LUMBAR REGION: ICD-10-CM

## 2022-03-08 PROCEDURE — 99213 OFFICE O/P EST LOW 20 MIN: CPT

## 2022-03-08 NOTE — PHYSICAL EXAM
[Poor Appearance] : well-appearing [Acute Distress] : not in acute distress [Obese] : not obese [de-identified] : CONSTITUTIONAL: The patient is a very pleasant individual who is well-nourished and who appears stated age.\par PSYCHIATRIC: The patient is alert and oriented X 3 and in no apparent distress, and participates with orthopedic evaluation well.\par HEAD: Atraumatic and is nonsyndromic in appearance.\par EENT: No visible thyromegaly, EOMI.\par RESPIRATORY: Respiratory rate is regular, not dyspneic on examination.\par LYMPHATICS: There is no inguinal lymphadenopathy\par INTEGUMENTARY: Skin is clean, dry, and intact about the bilateral lower extremities and lumbar spine.\par VASCULAR: There is brisk capillary refill about the bilateral lower extremities.\par NEUROLOGIC: There are no pathologic reflexes. There is no decrease in sensation of the bilateral lower extremities on Wartenberg pinwheel examination. Deep tendon reflexes are well maintained at 2+/4 of the bilateral lower extremities and are symmetric.\par MUSCULOSKELETAL: There is no visible muscular atrophy. Manual motor strength is well maintained in the bilateral lower extremities. Mechanically oriented lower back pain. Negative tension sign and straight leg raise bilaterally. Quad extension, ankle dorsiflexion, EHL, plantar flexion, and ankle eversion are well preserved. Normal secondary orthopaedic exam of bilateral hips, greater trochanteric area, knees and ankles. No pain with internal or external rotation of the bilateral hips.  [de-identified] : CT scan [facility] done October 18, 2021 demonstrate Prior L2 and L3 laminectomy, L3-L4 spinal canal stenosis Facet hypertrophy and anterolisthesis at L3 L4

## 2022-03-08 NOTE — DISCUSSION/SUMMARY
[de-identified] : We talked about the nature of the condition and treatment options. Anticipatory guidance regarding mechanically oriented lower back pain was given. Patient has been referred to physical therapy for decreased pain modalities, stretching and strengthening modalities, soft tissue modalities, and physical modalities. Patient has been referred to pain management for assessment regarding pain control. We also spoke about the benefits of using heat, application of ice to the area 2-3x daily for 20 minutes after periods of activity, and Bengay cream. Patient to follow up on a PRN basis. \par \par Prior to appointment and during encounter with patient extensive medical records were reviewed including but not limited to, hospital records, out patient records, imaging results, and lab data. During this appointment the patient was examined, diagnoses were discussed and explained in a face to face manner. In addition extensive time was spent reviewing aforementioned diagnostic studies. Counseling including abnormal image results, differential diagnoses, treatment options, risk and benefits, lifestyle changes, current condition, and current medications was performed. Patient's comments, questions, and concerns were address and patient verbalized understanding. Based on this patient's presentation at our office, which is an orthopedic spine surgeon's office, this patient inherently / intrinsically has a risk, however minute, of developing  issues such as Cauda equina syndrome, bowel and bladder changes, or progression of motor or neurological deficits such as paralysis which may be permanent.

## 2022-03-08 NOTE — HISTORY OF PRESENT ILLNESS
[de-identified] : 89 year old F Presents for follow up evaluation of an acute exacerbation of chronic mechanically oriented lower back pain. Patient presents with her daughter. They states that when she has an attack urgent care is unable to provide adequate care. She called her PCP and was prescribed muscle relaxants and Lidocaine which helped the acute exacerbation.  [Ataxia] : no ataxia [Incontinence] : no incontinence [Loss of Dexterity] : good dexterity [Urinary Ret.] : no urinary retention

## 2022-03-08 NOTE — ADDENDUM
[FreeTextEntry1] : Documented by Ras Luna acting as a scribe for Dr. Yoav Ford on 03/08/2022. All medical record entries made by the Scribe were at my, Dr. Yoav Ford, direction and personally dictated by me on 03/08/2022 . I have reviewed the chart and agree that the record accurately reflects my personal performance of the history, physical exam, assessment and plan. I have also personally directed, reviewed, and agreed with the chart.

## 2022-04-15 ENCOUNTER — OUTPATIENT (OUTPATIENT)
Dept: OUTPATIENT SERVICES | Facility: HOSPITAL | Age: 87
LOS: 1 days | End: 2022-04-15
Payer: MEDICARE

## 2022-04-15 ENCOUNTER — APPOINTMENT (OUTPATIENT)
Dept: ULTRASOUND IMAGING | Facility: CLINIC | Age: 87
End: 2022-04-15
Payer: MEDICARE

## 2022-04-15 DIAGNOSIS — Z98.89 OTHER SPECIFIED POSTPROCEDURAL STATES: Chronic | ICD-10-CM

## 2022-04-15 DIAGNOSIS — Z00.8 ENCOUNTER FOR OTHER GENERAL EXAMINATION: ICD-10-CM

## 2022-04-15 DIAGNOSIS — Z90.710 ACQUIRED ABSENCE OF BOTH CERVIX AND UTERUS: Chronic | ICD-10-CM

## 2022-04-15 PROCEDURE — 93971 EXTREMITY STUDY: CPT | Mod: 26,RT

## 2022-04-15 PROCEDURE — 93971 EXTREMITY STUDY: CPT

## 2022-06-21 ENCOUNTER — APPOINTMENT (OUTPATIENT)
Dept: PULMONOLOGY | Facility: CLINIC | Age: 87
End: 2022-06-21

## 2022-09-29 ENCOUNTER — OUTPATIENT (OUTPATIENT)
Dept: OUTPATIENT SERVICES | Facility: HOSPITAL | Age: 87
LOS: 1 days | End: 2022-09-29
Payer: MEDICARE

## 2022-09-29 ENCOUNTER — APPOINTMENT (OUTPATIENT)
Dept: CT IMAGING | Facility: CLINIC | Age: 87
End: 2022-09-29

## 2022-09-29 DIAGNOSIS — Z00.00 ENCOUNTER FOR GENERAL ADULT MEDICAL EXAMINATION WITHOUT ABNORMAL FINDINGS: ICD-10-CM

## 2022-09-29 DIAGNOSIS — Z98.89 OTHER SPECIFIED POSTPROCEDURAL STATES: Chronic | ICD-10-CM

## 2022-09-29 DIAGNOSIS — M54.16 RADICULOPATHY, LUMBAR REGION: ICD-10-CM

## 2022-09-29 DIAGNOSIS — Z90.710 ACQUIRED ABSENCE OF BOTH CERVIX AND UTERUS: Chronic | ICD-10-CM

## 2022-09-29 DIAGNOSIS — M54.14 RADICULOPATHY, THORACIC REGION: ICD-10-CM

## 2022-09-29 PROCEDURE — 72128 CT CHEST SPINE W/O DYE: CPT | Mod: MH

## 2022-09-29 PROCEDURE — 72128 CT CHEST SPINE W/O DYE: CPT | Mod: 26,MH

## 2022-09-29 PROCEDURE — 72131 CT LUMBAR SPINE W/O DYE: CPT | Mod: MH

## 2022-09-29 PROCEDURE — 72131 CT LUMBAR SPINE W/O DYE: CPT | Mod: 26,MH

## 2022-10-13 ENCOUNTER — EMERGENCY (EMERGENCY)
Facility: HOSPITAL | Age: 87
LOS: 1 days | Discharge: HOME | End: 2022-10-13
Attending: STUDENT IN AN ORGANIZED HEALTH CARE EDUCATION/TRAINING PROGRAM
Payer: MEDICARE

## 2022-10-13 VITALS
HEART RATE: 65 BPM | TEMPERATURE: 98 F | OXYGEN SATURATION: 100 % | DIASTOLIC BLOOD PRESSURE: 77 MMHG | RESPIRATION RATE: 18 BRPM | SYSTOLIC BLOOD PRESSURE: 142 MMHG | WEIGHT: 126.1 LBS | HEIGHT: 68 IN

## 2022-10-13 DIAGNOSIS — Z90.710 ACQUIRED ABSENCE OF BOTH CERVIX AND UTERUS: Chronic | ICD-10-CM

## 2022-10-13 DIAGNOSIS — Z98.89 OTHER SPECIFIED POSTPROCEDURAL STATES: Chronic | ICD-10-CM

## 2022-10-13 LAB
ALBUMIN SERPL ELPH-MCNC: 4.5 G/DL — SIGNIFICANT CHANGE UP (ref 3.3–5.2)
ALP SERPL-CCNC: 60 U/L — SIGNIFICANT CHANGE UP (ref 40–120)
ALT FLD-CCNC: 14 U/L — SIGNIFICANT CHANGE UP
ANION GAP SERPL CALC-SCNC: 13 MMOL/L — SIGNIFICANT CHANGE UP (ref 5–17)
APPEARANCE UR: ABNORMAL
AST SERPL-CCNC: 35 U/L — HIGH
BACTERIA # UR AUTO: ABNORMAL
BASOPHILS # BLD AUTO: 0.03 K/UL — SIGNIFICANT CHANGE UP (ref 0–0.2)
BASOPHILS NFR BLD AUTO: 0.7 % — SIGNIFICANT CHANGE UP (ref 0–2)
BILIRUB SERPL-MCNC: 0.7 MG/DL — SIGNIFICANT CHANGE UP (ref 0.4–2)
BILIRUB UR-MCNC: NEGATIVE — SIGNIFICANT CHANGE UP
BUN SERPL-MCNC: 14.4 MG/DL — SIGNIFICANT CHANGE UP (ref 8–20)
CALCIUM SERPL-MCNC: 9.5 MG/DL — SIGNIFICANT CHANGE UP (ref 8.4–10.5)
CHLORIDE SERPL-SCNC: 94 MMOL/L — LOW (ref 98–107)
CO2 SERPL-SCNC: 23 MMOL/L — SIGNIFICANT CHANGE UP (ref 22–29)
COLOR SPEC: ABNORMAL
CREAT SERPL-MCNC: 0.9 MG/DL — SIGNIFICANT CHANGE UP (ref 0.5–1.3)
DIFF PNL FLD: ABNORMAL
EGFR: 60 ML/MIN/1.73M2 — SIGNIFICANT CHANGE UP
EOSINOPHIL # BLD AUTO: 0.14 K/UL — SIGNIFICANT CHANGE UP (ref 0–0.5)
EOSINOPHIL NFR BLD AUTO: 3.2 % — SIGNIFICANT CHANGE UP (ref 0–6)
EPI CELLS # UR: SIGNIFICANT CHANGE UP
GLUCOSE SERPL-MCNC: 96 MG/DL — SIGNIFICANT CHANGE UP (ref 70–99)
GLUCOSE UR QL: NEGATIVE — SIGNIFICANT CHANGE UP
HCT VFR BLD CALC: 34.3 % — LOW (ref 34.5–45)
HGB BLD-MCNC: 11.6 G/DL — SIGNIFICANT CHANGE UP (ref 11.5–15.5)
IMM GRANULOCYTES NFR BLD AUTO: 0.2 % — SIGNIFICANT CHANGE UP (ref 0–0.9)
KETONES UR-MCNC: ABNORMAL
LEUKOCYTE ESTERASE UR-ACNC: ABNORMAL
LYMPHOCYTES # BLD AUTO: 1.38 K/UL — SIGNIFICANT CHANGE UP (ref 1–3.3)
LYMPHOCYTES # BLD AUTO: 31.9 % — SIGNIFICANT CHANGE UP (ref 13–44)
MCHC RBC-ENTMCNC: 33.8 GM/DL — SIGNIFICANT CHANGE UP (ref 32–36)
MCHC RBC-ENTMCNC: 33.9 PG — SIGNIFICANT CHANGE UP (ref 27–34)
MCV RBC AUTO: 100.3 FL — HIGH (ref 80–100)
MONOCYTES # BLD AUTO: 0.51 K/UL — SIGNIFICANT CHANGE UP (ref 0–0.9)
MONOCYTES NFR BLD AUTO: 11.8 % — SIGNIFICANT CHANGE UP (ref 2–14)
NEUTROPHILS # BLD AUTO: 2.25 K/UL — SIGNIFICANT CHANGE UP (ref 1.8–7.4)
NEUTROPHILS NFR BLD AUTO: 52.2 % — SIGNIFICANT CHANGE UP (ref 43–77)
NITRITE UR-MCNC: POSITIVE
PH UR: 6.5 — SIGNIFICANT CHANGE UP (ref 5–8)
PLATELET # BLD AUTO: 153 K/UL — SIGNIFICANT CHANGE UP (ref 150–400)
POTASSIUM SERPL-MCNC: 5 MMOL/L — SIGNIFICANT CHANGE UP (ref 3.5–5.3)
POTASSIUM SERPL-SCNC: 5 MMOL/L — SIGNIFICANT CHANGE UP (ref 3.5–5.3)
PROT SERPL-MCNC: 7.7 G/DL — SIGNIFICANT CHANGE UP (ref 6.6–8.7)
PROT UR-MCNC: 30 MG/DL
RBC # BLD: 3.42 M/UL — LOW (ref 3.8–5.2)
RBC # FLD: 12.7 % — SIGNIFICANT CHANGE UP (ref 10.3–14.5)
RBC CASTS # UR COMP ASSIST: >50 /HPF (ref 0–4)
SODIUM SERPL-SCNC: 130 MMOL/L — LOW (ref 135–145)
SP GR SPEC: 1.01 — SIGNIFICANT CHANGE UP (ref 1.01–1.02)
UROBILINOGEN FLD QL: NEGATIVE — SIGNIFICANT CHANGE UP
WBC # BLD: 4.32 K/UL — SIGNIFICANT CHANGE UP (ref 3.8–10.5)
WBC # FLD AUTO: 4.32 K/UL — SIGNIFICANT CHANGE UP (ref 3.8–10.5)
WBC UR QL: ABNORMAL /HPF (ref 0–5)

## 2022-10-13 PROCEDURE — 85025 COMPLETE CBC W/AUTO DIFF WBC: CPT

## 2022-10-13 PROCEDURE — 99284 EMERGENCY DEPT VISIT MOD MDM: CPT

## 2022-10-13 PROCEDURE — 96374 THER/PROPH/DIAG INJ IV PUSH: CPT

## 2022-10-13 PROCEDURE — 99284 EMERGENCY DEPT VISIT MOD MDM: CPT | Mod: 25

## 2022-10-13 PROCEDURE — 87077 CULTURE AEROBIC IDENTIFY: CPT

## 2022-10-13 PROCEDURE — 76775 US EXAM ABDO BACK WALL LIM: CPT

## 2022-10-13 PROCEDURE — 87086 URINE CULTURE/COLONY COUNT: CPT

## 2022-10-13 PROCEDURE — 80053 COMPREHEN METABOLIC PANEL: CPT

## 2022-10-13 PROCEDURE — 87186 SC STD MICRODIL/AGAR DIL: CPT

## 2022-10-13 PROCEDURE — 81001 URINALYSIS AUTO W/SCOPE: CPT

## 2022-10-13 PROCEDURE — 36415 COLL VENOUS BLD VENIPUNCTURE: CPT

## 2022-10-13 RX ORDER — CEFTRIAXONE 500 MG/1
1000 INJECTION, POWDER, FOR SOLUTION INTRAMUSCULAR; INTRAVENOUS ONCE
Refills: 0 | Status: COMPLETED | OUTPATIENT
Start: 2022-10-13 | End: 2022-10-13

## 2022-10-13 RX ORDER — CEFTRIAXONE 500 MG/1
1000 INJECTION, POWDER, FOR SOLUTION INTRAMUSCULAR; INTRAVENOUS ONCE
Refills: 0 | Status: DISCONTINUED | OUTPATIENT
Start: 2022-10-13 | End: 2022-10-13

## 2022-10-13 RX ORDER — ACETAMINOPHEN 500 MG
650 TABLET ORAL ONCE
Refills: 0 | Status: COMPLETED | OUTPATIENT
Start: 2022-10-13 | End: 2022-10-13

## 2022-10-13 RX ADMIN — CEFTRIAXONE 1000 MILLIGRAM(S): 500 INJECTION, POWDER, FOR SOLUTION INTRAMUSCULAR; INTRAVENOUS at 20:18

## 2022-10-13 RX ADMIN — Medication 650 MILLIGRAM(S): at 21:57

## 2022-10-13 NOTE — ED PROVIDER NOTE - CLINICAL SUMMARY MEDICAL DECISION MAKING FREE TEXT BOX
89F presenting with painless hematuria, requesting bladder sonogram and blood levels + kidney function tests which seems reasonable given presentation, ddx does include hemorrhagic cystitis, bladder mass, amongst other etiologies. Will check labs, imaging, follow up studies, reassess, dispo.

## 2022-10-13 NOTE — ED ADULT NURSE NOTE - NSIMPLEMENTINTERV_GEN_ALL_ED
Implemented All Fall with Harm Risk Interventions:  [place] to call system. Call bell, personal items and telephone within reach. Instruct patient to call for assistance. Room bathroom lighting operational. Non-slip footwear when patient is off stretcher. Physically safe environment: no spills, clutter or unnecessary equipment. Stretcher in lowest position, wheels locked, appropriate side rails in place. Provide visual cue, wrist band, yellow gown, etc. Monitor gait and stability. Monitor for mental status changes and reorient to person, place, and time. Review medications for side effects contributing to fall risk. Reinforce activity limits and safety measures with patient and family. Provide visual clues: red socks.

## 2022-10-13 NOTE — ED PROVIDER NOTE - NSFOLLOWUPCLINICS_GEN_ALL_ED_FT
[facility]  Geriatrics  [address]  [place] [address]  Phone: [phone]  Fax:   Follow Up Time: 1-3 Days     [facility]  Geriatrics  [address]  [place] [address]  Phone: [phone]  Fax:   Follow Up Time: 1-3 Days

## 2022-10-13 NOTE — ED ADULT NURSE NOTE - CHIEF COMPLAINT QUOTE
Patient ambulated into ED with steady gait, Pt c/o vaginal/ urinary bleeding since last night, went to MD sent for sono but unable to do it for few days

## 2022-10-13 NOTE — ED PROVIDER NOTE - OBJECTIVE STATEMENT
Patient reports onset of bleeding with urination, appears like alena blood only when she urinates for the past day, went to her primary doctors office and had an evaluation via two urinalysis performed both showing alena blood. Given the quantity of blood PMD was unable to tell if there was infection or not. Told by PMD she should have sonographic evaluation for possible other etiologies i.e. mass or signs of cystitis. Pt reports she has no pain, and was going to follow up outpatient but has no ability to get studies in outpatient setting until next week and wanted to follow up sooner as she has continued to have alena blood in her urine.

## 2022-10-13 NOTE — ED PROVIDER NOTE - PATIENT PORTAL LINK FT
You can access the FollowMyHealth Patient Portal offered by [facility] by registering at the following website: [email or web address]. By joining [employer]’s FollowMyHealth portal, you will also be able to view your health information using other applications (apps) compatible with our system. You can access the FollowMyHealth Patient Portal offered by [facility] by registering at the following website: [email or web address]. By joining [employer]’s FollowMyHealth portal, you will also be able to view your health information using other applications (apps) compatible with our system.

## 2022-10-13 NOTE — ED ADULT NURSE NOTE - OBJECTIVE STATEMENT
Pt arrives to ED sent in by PMHERLINDA wadsworth for US for hematuria that began today.  Pt denies urinary frequency, dysuria or odor.  Pt denies fever, chills, nausea, vomiting or abdominal pain.  Pt states urine is blood tinged in color but not BR.  Pt denies lightheadedness, dizziness, chest pain or sob

## 2022-10-13 NOTE — ED PROVIDER NOTE - PHYSICAL EXAMINATION
Gen: NAD, non-toxic, conversational  Eyes: PERRLA, EOMI   HENT: Normocephalic, atraumatic. External ears normal, no rhinorrhea, moist mucous membranes.   CV: RRR, no M/R/G  Resp: CTAB, non-labored, speaking without difficulty on room air  Abd: soft, non tender, non rigid, no guarding or rebound tenderness  Back: No CVAT bilaterally, no midline ttp  Skin: dry, wwp   Neuro: AOx3, speech is fluent and appropriate  Psych: Mood "okay", affect euthymic

## 2022-10-14 VITALS
TEMPERATURE: 98 F | SYSTOLIC BLOOD PRESSURE: 121 MMHG | HEART RATE: 65 BPM | RESPIRATION RATE: 18 BRPM | DIASTOLIC BLOOD PRESSURE: 65 MMHG | OXYGEN SATURATION: 100 %

## 2022-10-14 PROCEDURE — 76775 US EXAM ABDO BACK WALL LIM: CPT | Mod: 26

## 2022-10-14 RX ORDER — CEFDINIR 250 MG/5ML
1 POWDER, FOR SUSPENSION ORAL
Qty: 20 | Refills: 0
Start: 2022-10-14 | End: 2022-10-23

## 2022-10-14 RX ADMIN — Medication 650 MILLIGRAM(S): at 02:23

## 2022-11-08 NOTE — ED ADULT TRIAGE NOTE - CHIEF COMPLAINT QUOTE
Karlie is a 68 Karlie Quiroz MRN# [id number]   Age: 68 year old YOB: 1954     Date of Consultation: November 8, 2022  Consultation is requested by: Sophy Underwood MD  [id number] [place] [id number] Sophy Underwood  Primary care provider: Sophy Underwood       Reason for Sleep Consult:     Karlie Quiroz is sent by Sophy Underwood [place] Sleepiness Scale ( JYOTHI Uriarte  1990-1997[facility] [place]/English - Final version - 21 Nov 07 - [facility] [place].) JUAN)      Insomnia Severity Index (JUAN) Alberto Boyer PhD., [facility]        Harcourt Sleepiness Scale ( JYOTHI Uriarte  1990-1997[facility] [place]/English - JUAN)      Insomnia Severity Index (JUAN) Alberto Boyer PhD., [facility]      STOP BANG     STOP BANG Questionnaire (  2008, the American Society of Anesthesiologists, Inc.  Reggie Ravin & Ma, [facility] Journal, MARSHA Leonardo. and PHUONG Hurley, \"Caleb Hernadez, Lashaun Alicia, Markel Bailon [facility] JAIDA Willoughby 11/8/2022          Patient ambulated into ED with steady gait, Pt c/o vaginal/ urinary bleeding since last night, went to MD sent for sono but unable to do it for few days

## 2023-03-14 NOTE — ED ADULT TRIAGE NOTE - PAIN RATING/NUMBER SCALE (0-10): REST
Pulmonary Function Test  Performed by: Katie Ortiz, RRT  Authorized by: Corine Yanez APRN      Pre Drug % Predicted    FVC: 75%   FEV1: 74%   FEF 25-75%: 75%   FEV1/FVC: 77%   DLCO: 87%   D/VAsb: 109%    Interpretation   Spirometry   Spirometry shows mild restriction. midflow is normal.  Review of FVL curve   Patient's effort is normal.   Diffusion Capacity  The patient's diffusion capacity is normal.  Diffusion capacity is normal when corrected for alveolar volume.       
8

## 2023-08-22 ENCOUNTER — APPOINTMENT (OUTPATIENT)
Dept: ULTRASOUND IMAGING | Facility: CLINIC | Age: 88
End: 2023-08-22

## 2023-08-22 ENCOUNTER — APPOINTMENT (OUTPATIENT)
Dept: RADIOLOGY | Facility: CLINIC | Age: 88
End: 2023-08-22
Payer: MEDICARE

## 2023-08-22 ENCOUNTER — OUTPATIENT (OUTPATIENT)
Dept: OUTPATIENT SERVICES | Facility: HOSPITAL | Age: 88
LOS: 1 days | End: 2023-08-22
Payer: MEDICARE

## 2023-08-22 DIAGNOSIS — Z98.89 OTHER SPECIFIED POSTPROCEDURAL STATES: Chronic | ICD-10-CM

## 2023-08-22 DIAGNOSIS — Z90.710 ACQUIRED ABSENCE OF BOTH CERVIX AND UTERUS: Chronic | ICD-10-CM

## 2023-08-22 DIAGNOSIS — Z00.8 ENCOUNTER FOR OTHER GENERAL EXAMINATION: ICD-10-CM

## 2023-08-22 PROCEDURE — 93971 EXTREMITY STUDY: CPT | Mod: 26,RT

## 2023-08-22 PROCEDURE — 93971 EXTREMITY STUDY: CPT

## 2023-08-22 PROCEDURE — 73610 X-RAY EXAM OF ANKLE: CPT

## 2023-08-22 PROCEDURE — 73610 X-RAY EXAM OF ANKLE: CPT | Mod: 26,RT

## 2023-10-14 ENCOUNTER — TRANSCRIPTION ENCOUNTER (OUTPATIENT)
Age: 88
End: 2023-10-14

## 2023-10-14 ENCOUNTER — INPATIENT (INPATIENT)
Facility: HOSPITAL | Age: 88
LOS: 5 days | Discharge: HOME | DRG: 481 | End: 2023-10-20
Attending: HOSPITALIST | Admitting: STUDENT IN AN ORGANIZED HEALTH CARE EDUCATION/TRAINING PROGRAM
Payer: MEDICARE

## 2023-10-14 VITALS
DIASTOLIC BLOOD PRESSURE: 72 MMHG | WEIGHT: 149.91 LBS | SYSTOLIC BLOOD PRESSURE: 125 MMHG | TEMPERATURE: 98 F | HEART RATE: 83 BPM | RESPIRATION RATE: 18 BRPM | OXYGEN SATURATION: 97 %

## 2023-10-14 DIAGNOSIS — Z98.89 OTHER SPECIFIED POSTPROCEDURAL STATES: Chronic | ICD-10-CM

## 2023-10-14 DIAGNOSIS — Z90.710 ACQUIRED ABSENCE OF BOTH CERVIX AND UTERUS: Chronic | ICD-10-CM

## 2023-10-14 DIAGNOSIS — S72.002A FRACTURE OF UNSPECIFIED PART OF NECK OF LEFT FEMUR, INITIAL ENCOUNTER FOR CLOSED FRACTURE: ICD-10-CM

## 2023-10-14 LAB
ALBUMIN SERPL ELPH-MCNC: 3.9 G/DL — SIGNIFICANT CHANGE UP (ref 3.3–5.2)
ALP SERPL-CCNC: 54 U/L — SIGNIFICANT CHANGE UP (ref 40–120)
ALT FLD-CCNC: 12 U/L — SIGNIFICANT CHANGE UP
ANION GAP SERPL CALC-SCNC: 12 MMOL/L — SIGNIFICANT CHANGE UP (ref 5–17)
ANION GAP SERPL CALC-SCNC: 14 MMOL/L — SIGNIFICANT CHANGE UP (ref 5–17)
APTT BLD: 37.4 SEC — HIGH (ref 24.5–35.6)
AST SERPL-CCNC: 28 U/L — SIGNIFICANT CHANGE UP
BASOPHILS # BLD AUTO: 0.04 K/UL — SIGNIFICANT CHANGE UP (ref 0–0.2)
BASOPHILS NFR BLD AUTO: 0.6 % — SIGNIFICANT CHANGE UP (ref 0–2)
BILIRUB SERPL-MCNC: 0.7 MG/DL — SIGNIFICANT CHANGE UP (ref 0.4–2)
BLD GP AB SCN SERPL QL: SIGNIFICANT CHANGE UP
BUN SERPL-MCNC: 16.9 MG/DL — SIGNIFICANT CHANGE UP (ref 8–20)
BUN SERPL-MCNC: 18.2 MG/DL — SIGNIFICANT CHANGE UP (ref 8–20)
CALCIUM SERPL-MCNC: 9 MG/DL — SIGNIFICANT CHANGE UP (ref 8.4–10.5)
CALCIUM SERPL-MCNC: 9.1 MG/DL — SIGNIFICANT CHANGE UP (ref 8.4–10.5)
CHLORIDE SERPL-SCNC: 92 MMOL/L — LOW (ref 96–108)
CHLORIDE SERPL-SCNC: 95 MMOL/L — LOW (ref 96–108)
CO2 SERPL-SCNC: 19 MMOL/L — LOW (ref 22–29)
CO2 SERPL-SCNC: 22 MMOL/L — SIGNIFICANT CHANGE UP (ref 22–29)
CREAT SERPL-MCNC: 0.88 MG/DL — SIGNIFICANT CHANGE UP (ref 0.5–1.3)
CREAT SERPL-MCNC: 1.02 MG/DL — SIGNIFICANT CHANGE UP (ref 0.5–1.3)
EGFR: 51 ML/MIN/1.73M2 — LOW
EGFR: 61 ML/MIN/1.73M2 — SIGNIFICANT CHANGE UP
EOSINOPHIL # BLD AUTO: 0.08 K/UL — SIGNIFICANT CHANGE UP (ref 0–0.5)
EOSINOPHIL NFR BLD AUTO: 1.2 % — SIGNIFICANT CHANGE UP (ref 0–6)
GLUCOSE SERPL-MCNC: 114 MG/DL — HIGH (ref 70–99)
GLUCOSE SERPL-MCNC: 127 MG/DL — HIGH (ref 70–99)
HCT VFR BLD CALC: 31.6 % — LOW (ref 34.5–45)
HGB BLD-MCNC: 10.5 G/DL — LOW (ref 11.5–15.5)
IMM GRANULOCYTES NFR BLD AUTO: 0.6 % — SIGNIFICANT CHANGE UP (ref 0–0.9)
INR BLD: 1.45 RATIO — HIGH (ref 0.85–1.18)
LYMPHOCYTES # BLD AUTO: 0.95 K/UL — LOW (ref 1–3.3)
LYMPHOCYTES # BLD AUTO: 14.6 % — SIGNIFICANT CHANGE UP (ref 13–44)
MCHC RBC-ENTMCNC: 33 PG — SIGNIFICANT CHANGE UP (ref 27–34)
MCHC RBC-ENTMCNC: 33.2 GM/DL — SIGNIFICANT CHANGE UP (ref 32–36)
MCV RBC AUTO: 99.4 FL — SIGNIFICANT CHANGE UP (ref 80–100)
MONOCYTES # BLD AUTO: 0.58 K/UL — SIGNIFICANT CHANGE UP (ref 0–0.9)
MONOCYTES NFR BLD AUTO: 8.9 % — SIGNIFICANT CHANGE UP (ref 2–14)
NEUTROPHILS # BLD AUTO: 4.81 K/UL — SIGNIFICANT CHANGE UP (ref 1.8–7.4)
NEUTROPHILS NFR BLD AUTO: 74.1 % — SIGNIFICANT CHANGE UP (ref 43–77)
PLATELET # BLD AUTO: 115 K/UL — LOW (ref 150–400)
POTASSIUM SERPL-MCNC: 4.4 MMOL/L — SIGNIFICANT CHANGE UP (ref 3.5–5.3)
POTASSIUM SERPL-MCNC: 4.5 MMOL/L — SIGNIFICANT CHANGE UP (ref 3.5–5.3)
POTASSIUM SERPL-SCNC: 4.4 MMOL/L — SIGNIFICANT CHANGE UP (ref 3.5–5.3)
POTASSIUM SERPL-SCNC: 4.5 MMOL/L — SIGNIFICANT CHANGE UP (ref 3.5–5.3)
PROT SERPL-MCNC: 7 G/DL — SIGNIFICANT CHANGE UP (ref 6.6–8.7)
PROTHROM AB SERPL-ACNC: 15.9 SEC — HIGH (ref 9.5–13)
RBC # BLD: 3.18 M/UL — LOW (ref 3.8–5.2)
RBC # FLD: 13.1 % — SIGNIFICANT CHANGE UP (ref 10.3–14.5)
SODIUM SERPL-SCNC: 126 MMOL/L — LOW (ref 135–145)
SODIUM SERPL-SCNC: 128 MMOL/L — LOW (ref 135–145)
TROPONIN T SERPL-MCNC: <0.01 NG/ML — SIGNIFICANT CHANGE UP (ref 0–0.06)
WBC # BLD: 6.5 K/UL — SIGNIFICANT CHANGE UP (ref 3.8–10.5)
WBC # FLD AUTO: 6.5 K/UL — SIGNIFICANT CHANGE UP (ref 3.8–10.5)

## 2023-10-14 PROCEDURE — 93010 ELECTROCARDIOGRAM REPORT: CPT

## 2023-10-14 PROCEDURE — 99223 1ST HOSP IP/OBS HIGH 75: CPT

## 2023-10-14 PROCEDURE — 99222 1ST HOSP IP/OBS MODERATE 55: CPT | Mod: FS,57

## 2023-10-14 PROCEDURE — 99285 EMERGENCY DEPT VISIT HI MDM: CPT

## 2023-10-14 PROCEDURE — 73552 X-RAY EXAM OF FEMUR 2/>: CPT | Mod: 26,LT

## 2023-10-14 PROCEDURE — 71045 X-RAY EXAM CHEST 1 VIEW: CPT | Mod: 26

## 2023-10-14 PROCEDURE — 72192 CT PELVIS W/O DYE: CPT | Mod: 26

## 2023-10-14 PROCEDURE — 73502 X-RAY EXAM HIP UNI 2-3 VIEWS: CPT | Mod: 26,LT

## 2023-10-14 PROCEDURE — 70450 CT HEAD/BRAIN W/O DYE: CPT | Mod: 26

## 2023-10-14 RX ORDER — SPIRONOLACTONE 25 MG/1
25 TABLET, FILM COATED ORAL DAILY
Refills: 0 | Status: DISCONTINUED | OUTPATIENT
Start: 2023-10-14 | End: 2023-10-15

## 2023-10-14 RX ORDER — SODIUM CHLORIDE 9 MG/ML
500 INJECTION INTRAMUSCULAR; INTRAVENOUS; SUBCUTANEOUS ONCE
Refills: 0 | Status: COMPLETED | OUTPATIENT
Start: 2023-10-14 | End: 2023-10-14

## 2023-10-14 RX ORDER — TRANEXAMIC ACID 100 MG/ML
1000 INJECTION, SOLUTION INTRAVENOUS ONCE
Refills: 0 | Status: COMPLETED | OUTPATIENT
Start: 2023-10-14 | End: 2023-10-14

## 2023-10-14 RX ORDER — ACETAMINOPHEN 500 MG
1000 TABLET ORAL ONCE
Refills: 0 | Status: COMPLETED | OUTPATIENT
Start: 2023-10-14 | End: 2023-10-14

## 2023-10-14 RX ORDER — METOPROLOL TARTRATE 50 MG
50 TABLET ORAL
Refills: 0 | Status: DISCONTINUED | OUTPATIENT
Start: 2023-10-14 | End: 2023-10-20

## 2023-10-14 RX ORDER — ONDANSETRON 8 MG/1
4 TABLET, FILM COATED ORAL EVERY 8 HOURS
Refills: 0 | Status: DISCONTINUED | OUTPATIENT
Start: 2023-10-14 | End: 2023-10-20

## 2023-10-14 RX ORDER — SODIUM CHLORIDE 9 MG/ML
1 INJECTION INTRAMUSCULAR; INTRAVENOUS; SUBCUTANEOUS THREE TIMES A DAY
Refills: 0 | Status: DISCONTINUED | OUTPATIENT
Start: 2023-10-14 | End: 2023-10-19

## 2023-10-14 RX ORDER — POVIDONE-IODINE 5 %
1 AEROSOL (ML) TOPICAL ONCE
Refills: 0 | Status: DISCONTINUED | OUTPATIENT
Start: 2023-10-14 | End: 2023-10-20

## 2023-10-14 RX ORDER — ACETAMINOPHEN 500 MG
975 TABLET ORAL EVERY 8 HOURS
Refills: 0 | Status: DISCONTINUED | OUTPATIENT
Start: 2023-10-14 | End: 2023-10-20

## 2023-10-14 RX ORDER — ASCORBIC ACID 60 MG
1 TABLET,CHEWABLE ORAL
Qty: 0 | Refills: 0 | DISCHARGE

## 2023-10-14 RX ORDER — METOPROLOL TARTRATE 50 MG
1 TABLET ORAL
Qty: 0 | Refills: 0 | DISCHARGE

## 2023-10-14 RX ORDER — LEVOTHYROXINE SODIUM 125 MCG
1 TABLET ORAL
Qty: 0 | Refills: 0 | DISCHARGE

## 2023-10-14 RX ORDER — SODIUM CHLORIDE 9 MG/ML
1000 INJECTION INTRAMUSCULAR; INTRAVENOUS; SUBCUTANEOUS
Refills: 0 | Status: DISCONTINUED | OUTPATIENT
Start: 2023-10-14 | End: 2023-10-17

## 2023-10-14 RX ORDER — MUPIROCIN 20 MG/G
1 OINTMENT TOPICAL
Refills: 0 | Status: COMPLETED | OUTPATIENT
Start: 2023-10-14 | End: 2023-10-19

## 2023-10-14 RX ORDER — GABAPENTIN 400 MG/1
1 CAPSULE ORAL
Qty: 0 | Refills: 0 | DISCHARGE

## 2023-10-14 RX ORDER — LANOLIN ALCOHOL/MO/W.PET/CERES
3 CREAM (GRAM) TOPICAL AT BEDTIME
Refills: 0 | Status: DISCONTINUED | OUTPATIENT
Start: 2023-10-14 | End: 2023-10-20

## 2023-10-14 RX ORDER — MORPHINE SULFATE 50 MG/1
2 CAPSULE, EXTENDED RELEASE ORAL ONCE
Refills: 0 | Status: DISCONTINUED | OUTPATIENT
Start: 2023-10-14 | End: 2023-10-14

## 2023-10-14 RX ORDER — CHLORHEXIDINE GLUCONATE 213 G/1000ML
1 SOLUTION TOPICAL EVERY 12 HOURS
Refills: 0 | Status: COMPLETED | OUTPATIENT
Start: 2023-10-14 | End: 2023-10-15

## 2023-10-14 RX ORDER — GABAPENTIN 400 MG/1
100 CAPSULE ORAL THREE TIMES A DAY
Refills: 0 | Status: DISCONTINUED | OUTPATIENT
Start: 2023-10-14 | End: 2023-10-20

## 2023-10-14 RX ORDER — LEVOTHYROXINE SODIUM 125 MCG
50 TABLET ORAL DAILY
Refills: 0 | Status: DISCONTINUED | OUTPATIENT
Start: 2023-10-14 | End: 2023-10-20

## 2023-10-14 RX ORDER — INFLUENZA VIRUS VACCINE 15; 15; 15; 15 UG/.5ML; UG/.5ML; UG/.5ML; UG/.5ML
0.7 SUSPENSION INTRAMUSCULAR ONCE
Refills: 0 | Status: COMPLETED | OUTPATIENT
Start: 2023-10-14 | End: 2023-10-20

## 2023-10-14 RX ADMIN — Medication 400 MILLIGRAM(S): at 19:35

## 2023-10-14 RX ADMIN — SODIUM CHLORIDE 50 MILLILITER(S): 9 INJECTION INTRAMUSCULAR; INTRAVENOUS; SUBCUTANEOUS at 23:54

## 2023-10-14 RX ADMIN — TRANEXAMIC ACID 220 MILLIGRAM(S): 100 INJECTION, SOLUTION INTRAVENOUS at 19:02

## 2023-10-14 RX ADMIN — MORPHINE SULFATE 2 MILLIGRAM(S): 50 CAPSULE, EXTENDED RELEASE ORAL at 23:54

## 2023-10-14 RX ADMIN — SODIUM CHLORIDE 1 GRAM(S): 9 INJECTION INTRAMUSCULAR; INTRAVENOUS; SUBCUTANEOUS at 22:36

## 2023-10-14 RX ADMIN — Medication 975 MILLIGRAM(S): at 21:23

## 2023-10-14 RX ADMIN — Medication 1000 MILLIGRAM(S): at 15:56

## 2023-10-14 RX ADMIN — SODIUM CHLORIDE 500 MILLILITER(S): 9 INJECTION INTRAMUSCULAR; INTRAVENOUS; SUBCUTANEOUS at 19:00

## 2023-10-14 RX ADMIN — ONDANSETRON 4 MILLIGRAM(S): 8 TABLET, FILM COATED ORAL at 23:54

## 2023-10-14 RX ADMIN — SODIUM CHLORIDE 500 MILLILITER(S): 9 INJECTION INTRAMUSCULAR; INTRAVENOUS; SUBCUTANEOUS at 19:01

## 2023-10-14 RX ADMIN — Medication 400 MILLIGRAM(S): at 15:41

## 2023-10-14 RX ADMIN — Medication 1000 MILLIGRAM(S): at 16:00

## 2023-10-14 RX ADMIN — SODIUM CHLORIDE 500 MILLILITER(S): 9 INJECTION INTRAMUSCULAR; INTRAVENOUS; SUBCUTANEOUS at 18:22

## 2023-10-14 RX ADMIN — GABAPENTIN 100 MILLIGRAM(S): 400 CAPSULE ORAL at 21:23

## 2023-10-14 RX ADMIN — Medication 3 MILLIGRAM(S): at 23:54

## 2023-10-14 NOTE — ED ADULT NURSE NOTE - NSFALLHARMRISKINTERV_ED_ALL_ED

## 2023-10-14 NOTE — H&P ADULT - ASSESSMENT
ASSESSMENT:  This is a 89 year old female w/ PMHx of asthma, dementia AAOX2 - does not know date, CHF, atrial fibrillation on Xarelto, HTN, HLD, hypothyroidism, nephrolithiasis, and hyponatremia presents for evaluation after a fall while making her bed unwitnessed.      PLAN:  1.  S/P fall  -afebrile, no leukocytosis  -Initial trop *** at ***, will f/u 2nd trop  -EKG as above  -CXRAY as above  -U/A as above  -XRAY shows  -will f/u orthostats  -will start IVF  -will f/u TTE  -head CT w/o contrast as above  -will place on fall precautions  -will consult PT/OT in AM  -S/P in ED  -rapid RVP panel negative  -COVID PCR negative  -PTT 15.9  -INR 1.45  -PTT 37.4  -LFTs within normal limits  -S/P type and screen in ER  -S/P NS 500cc bolus IV X 2 in ER  -S/P ofirmev 1g IV X 2, tranexamic acid 1g IV X 1 in ER  -will keep NPO after midnight for possible procedure  -will f/u CT pelvis bony w/o contrast   -will f/u CXRAY  -will f/u left femur XRAYs  -will f/u left hip/pelvis XRAYs      2.Anemia  -will monitor for now  -hemoglobin 10.5  -hematocrit 31.6  -will f/u iron, ferritin, TIBC, B12 and folate  -will f/u hemoccult    3.Thrombocytopenia  -will monitor for now  -platelet count 115    4.Hyponatremia w/ hx of hyponatremia  -sodium 126  -will start NS      DVT PPx ASSESSMENT:  This is a 89 year old female w/ PMHx of asthma, dementia AAOX2 - does not know date, CHF, atrial fibrillation on Xarelto, HTN, HLD, hypothyroidism, nephrolithiasis, and hyponatremia presents for evaluation after a fall while making her bed unwitnessed.      PLAN:  1.  S/P fall + left hip fracture  -afebrile, no leukocytosis  -will f/u initial trop   -EKG as above  -will f/u official report of CXRAY  -will start gentle hydration w/ NS  -will f/u head CT w/o contrast   -will place on fall precautions  -will consult PT/OT post -OR   -PTT 15.9  -INR 1.45  -PTT 37.4  -LFTs within normal limits  -S/P type and screen in ER  -S/P NS 500cc bolus IV X 2 in ER  -S/P ofirmev 1g IV X 2, tranexamic acid 1g IV X 1 in ER  -will keep NPO after midnight for possible procedure  -will f/u CT pelvis bony w/o contrast   -will f/u CXRAY  -will f/u left femur XRAYs  -will f/u left hip/pelvis XRAYs  -orthopedics consulted in ER, will f/u further recommendations  -will have to await STAT head CT w/o contrast prior to clearance, as she on eliquis, and unable to provide insight herself  -METS 1  -ASA class 2E  -RCRI class 2 risk, 6% 30 day risk of death, MI, or cardiac arrest    2.Anemia  -will monitor for now  -hemoglobin 10.5  -hematocrit 31.6  -will f/u iron, ferritin, TIBC, B12 and folate  -will f/u hemoccult    3.Thrombocytopenia  -will monitor for now  -platelet count 115    4.Hyponatremia w/ hx of hyponatremia  -sodium 126  -will start NS      DVT PPx ASSESSMENT:  This is a 89 year old female w/ PMHx of asthma, dementia AAOX2 - does not know date, CHF, atrial fibrillation on Xarelto, HTN, HLD, hypothyroidism, nephrolithiasis, and hyponatremia presents for evaluation after a fall while making her bed unwitnessed.      PLAN:  1.  S/P fall + left hip fracture  -afebrile, no leukocytosis  -will f/u initial trop   -EKG as above  -will f/u official report of CXRAY  -will start gentle hydration w/ NS  -will f/u head CT w/o contrast   -will place on fall precautions  -will consult PT/OT post -OR   -PTT 15.9  -INR 1.45  -PTT 37.4  -LFTs within normal limits  -S/P type and screen in ER  -S/P NS 500cc bolus IV X 2 in ER  -S/P ofirmev 1g IV X 2, tranexamic acid 1g IV X 1 in ER  -will keep NPO after midnight for possible procedure  -will f/u CT pelvis bony w/o contrast   -will f/u CXRAY  -will f/u left femur XRAYs  -will f/u left hip/pelvis XRAYs  -orthopedics consulted in ER, will f/u further recommendations  -will have to await STAT head CT w/o contrast prior to clearance, as she on eliquis, and unable to provide insight herself  -METS 1  -ASA class 2E  -RCRI class 2 risk, 6% 30 day risk of death, MI, or cardiac arrest  -MARTA risk score 0.3%, risk of MI, cardiac arrest, intra-op or 30 day post-op  -STOP bang score       2.Anemia  -will monitor for now  -hemoglobin 10.5  -hematocrit 31.6  -will f/u iron, ferritin, TIBC, B12 and folate  -will f/u hemoccult    3.Thrombocytopenia  -will monitor for now  -platelet count 115    4.Hyponatremia w/ hx of hyponatremia  -sodium 126  -will start NS      DVT PPx ASSESSMENT:  This is a 89 year old female w/ PMHx of asthma, dementia AAOX2 - does not know date, CHF, atrial fibrillation on Xarelto, HTN, HLD, hypothyroidism, nephrolithiasis, and hyponatremia presents for evaluation after a fall while making her bed unwitnessed.      PLAN:  1.  S/P fall + left hip fracture w/ hx of atrial fibrillation on eliquis  -afebrile, no leukocytosis  -will f/u initial trop   -EKG as above  -will f/u official report of CXRAY  -will start gentle hydration w/ NS  -will f/u head CT w/o contrast   -will place on fall precautions  -will consult PT/OT post -OR   -PTT 15.9  -INR 1.45  -PTT 37.4  -LFTs within normal limits  -S/P type and screen in ER  -S/P NS 500cc bolus IV X 2 in ER, will continue  -S/P ofirmev 1g IV X 2, tranexamic acid 1g IV X 1 in ER  -will keep NPO after midnight for possible procedure  -will f/u CT pelvis bony w/o contrast   -will f/u CXRAY  -will f/u left femur XRAYs  -will f/u left hip/pelvis XRAYs  -orthopedics consulted in ER, will f/u further recommendations  -METS 1  -ASA class 2E  -RCRI class 2 risk, 6% 30 day risk of death, MI, or cardiac arrest  -MARTA risk score 0.3%, risk of MI, cardiac arrest, intra-op or 30 day post-op  -STOP bang score 2, low risk of TIGIST  -risk factors for pulmonary disease, 0  -will have to await STAT head CT w/o contrast prior to clearance and initial trop, as she on eliquis, and unable to provide insight herself, if initial troponin and head CT w/o contrast negative, patient will be medically clear for OR in AM  -will hold eliquis, last dose morning of 10/14/23  -will continue metoprolol 50mg BID    2.Anemia  -will monitor for now  -hemoglobin 10.5  -hematocrit 31.6  -will f/u iron, ferritin, TIBC, B12 and folate  -will f/u hemoccult    3.Thrombocytopenia  -will monitor for now  -platelet count 115    4.Hyponatremia w/ hx of hyponatremia 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

## 2023-10-14 NOTE — CONSULT NOTE ADULT - NS ATTEND AMEND GEN_ALL_CORE FT
pt seen and examined. valgus impacted FN. Will benefit from pinning. d/w pt risks/benefits. possible laurie vs jose armando in future if pinning fails. in agreement. cleared for OR. will proceed.

## 2023-10-14 NOTE — ED PROVIDER NOTE - OBJECTIVE STATEMENT
89-year-old female patient with a history of A-fib, on Xarelto presents to the ED complaining of fall.  Around 10 AM this morning the patient was getting out of bed when she slipped and fell landing on her left hip.  She did not hit her head or lose consciousness, and her  was able to help her off the ground.  Patient brought in by ambulance secondary to left hip pain.  Patient states that she been unable to move her left leg secondary to pain.  She denies any numbness or weakness, normally ambulates on her own without a walker or assistance.  No chest pain or shortness of breath.  No recent fever or chills.  No further complaints at this time

## 2023-10-14 NOTE — ED PROVIDER NOTE - ATTENDING CONTRIBUTION TO CARE
PT with a trip and fall co L hip pain. no numbness weakness. no head strike.    physical- rrr. ctab. abd - soft, nt. no edema. no rash. L hip with pain on external rotation. nvi distally.    plan - labs and imaging reviewed.mild hyponatremia. L hip fx. ortho consulted. medicine to admit.

## 2023-10-14 NOTE — ED ADULT NURSE NOTE - PAIN: BODY LOCATION
Pt received semisupine in bed, L hip Pt received semisupine in bed, in NAD. Pt agreeable to participate in PT evaluation.

## 2023-10-14 NOTE — CONSULT NOTE ADULT - SUBJECTIVE AND OBJECTIVE BOX
Pt Name: OFELIA VILLANUEVA    MRN: [id number]      Patient is a 89y Female PMHx Pacemaker on eliquis, dementia, HTN, HLD presenting to the emergency department with a chief complaint of LEFT hip pain s/p mechanical Fall this morning. Patient fell onto Left hip. Pain is localized to LEFT hip without radiating. Ambulates independently. of note, left ankle/foot orthopedic surgery "many years ago". Patient denies  LOC, HA, Numbness, tingling, N/V, HA or other orthopedic complaints.        PAST MEDICAL & SURGICAL HISTORY:  PAST MEDICAL & SURGICAL HISTORY:  Hypertension      Pacemaker      GERD (gastroesophageal reflux disease)      Afib      H/O: hysterectomy      H/O laminectomy  lumbar          Allergies: Robaxin (Other)  shellfish (Rash)  Demerol HCl (Nausea)  codeine (Unknown)  Bactrim (Other)      Medications: acetaminophen     Tablet .. 975 milliGRAM(s) Oral every 8 hours  acetaminophen   IVPB .. 1000 milliGRAM(s) IV Intermittent Once  chlorhexidine 2% Cloths 1 Application(s) Topical every 12 hours  mupirocin 2% Ointment 1 Application(s) Both Nostrils two times a day  povidone iodine 5% Nasal Swab 1 Application(s) Both Nostrils once  sodium chloride 0.9% Bolus 500 milliLiter(s) IV Bolus once  tranexamic acid IVPB 1000 milliGRAM(s) IV Intermittent Once      FAMILY HISTORY:  Family history of ovarian cancer (Mother)    : non-contributory    Social History:   Ambulation: Walking independently [X ] With Cane [ ] With Walker [ ]  Bedbound [ ]                           10.5   6.50  )-----------( 115      ( 14 Oct 2023 15:00 )             31.6       10-14    126<L>  |  92<L>  |  18.2  ----------------------------<  114<H>  4.5   |  22.0  |  1.02    Ca    9.1      14 Oct 2023 15:00    TPro  7.0  /  Alb  3.9  /  TBili  0.7  /  DBili  x   /  AST  28  /  ALT  12  /  AlkPhos  54  10-14      Vital Signs Last 24 Hrs  T(C): 36.8 (14 Oct 2023 15:25), Max: 36.8 (14 Oct 2023 15:25)  T(F): 98.3 (14 Oct 2023 15:25), Max: 98.3 (14 Oct 2023 15:25)  HR: 89 (14 Oct 2023 15:25) (83 - 89)  BP: 157/88 (14 Oct 2023 15:25) (125/72 - 157/88)  BP(mean): --  RR: 18 (14 Oct 2023 15:25) (18 - 18)  SpO2: 97% (14 Oct 2023 15:25) (97% - 97%)    Parameters below as of 14 Oct 2023 15:25  Patient On (Oxygen Delivery Method): room air            PHYSICAL EXAM:      Appearance: Alert, responsive, in no acute distress.    Neurological: Sensation is grossly intact to light touch. No focal deficits or weaknesses found.    Skin: Skin is clean, dry and intact. No tenting. No bleeding. No abrasions. No ulcerations.    Vascular: 2+ DP. Cap refill < 2 sec. No signs of venous insufficiency or stasis. No extremity ulcerations. No cyanosis.    Musculoskeletal:         Left Upper Extremity: Spontaneously moving. Active ROM of digits, wrist flexion extension, elbow flexion extension, shoulder abduction adduction without pain elicited. Clavicle nontender to palpation. No bony tenderness. Compartments soft, compresible, nontender. Denies Numbness, tingling, other orthopedic complaints.       Right Upper Extremity: Spontaneously moving. Active ROM of digits, wrist flexion extension, elbow flexion extension, shoulder abduction adduction without pain elicited. Clavicle nontender to palpation. No bony tenderness. Compartments soft, compresible, nontender. Denies Numbness, tingling, other orthopedic complaints.       Left Lower Extremity: - SLR. - knee Flexion due to pain. + dorsi plantar flexion. EHL, FHL intact. tender to palpation over Left hip region. Sacroiliac compression nontender. Compartments Soft, compressible, nontender.Denies Numbness, tingling, other orthopedic complaints.       Right Lower Extremity: + SLR. + knee Flexion. + dorsi plantar flexion. EHL, FHL intact. Active FROM without elicited pain. No bony tenderness. Compartments Soft, compressible, nontender.Denies Numbness, tingling, other orthopedic complaints.      Imaging Studies:  LEFT femoral neck Hip fracture on prelim read    A/P:  Pt is a  89y Female found to have LEFT femoral neck hip Fracture    PLAN:   * D/w Dr. Sprague  * Pain control  * bedrest  * TXA ordered STAT  * NPO after midnight for OR tomorrow  * IV fluids to start after midnight  * Antibiotics on hold for OR  * Medical Optimization for OR Tomorrow   * Continue Care as per primary team

## 2023-10-14 NOTE — H&P ADULT - NSHPLABSRESULTS_GEN_ALL_CORE
labs personally reviewed and pertinent [facility] documents/labs/diagnostics reviewed                         10.5   6.50  )-----------( 115      ( 14 Oct 2023 15:00 )             31.6       10-14    126<L>  |  92<L>  |  18.2  ----------------------------<  114<H>  4.5   |  22.0  |  1.02    Ca    9.1      14 Oct 2023 15:00    TPro  7.0  /  Alb  3.9  /  TBili  0.7  /  DBili  x   /  AST  28  /  ALT  12  /  AlkPhos  54  10-14    PT/INR - ( 14 Oct 2023 15:00 )   PT: 15.9 sec;   INR: 1.45 ratio      PTT - ( 14 Oct 2023 15:00 )  PTT:37.4 sec    Urinalysis Basic - ( 14 Oct 2023 15:00 )    Color: x / Appearance: x / SG: x / pH: x  Gluc: 114 mg/dL / Ketone: x  / Bili: x / Urobili: x   Blood: x / Protein: x / Nitrite: x   Leuk Esterase: x / RBC: x / WBC x   Sq Epi: x / Non Sq Epi: x / Bacteria: x    EKG: read by me, atrial fibrillation at 95 bpm

## 2023-10-14 NOTE — H&P ADULT - NSHPPHYSICALEXAM_GEN_ALL_CORE
PHYSICAL EXAM:  Vital Signs:  Vital Signs (24 Hrs):  T(C): 36.4 (10-14-23 @ 19:15), Max: 36.8 (10-14-23 @ 15:25)  HR: 106 (10-14-23 @ 19:15) (83 - 106)  BP: 157/92 (10-14-23 @ 19:15) (125/72 - 157/92)  RR: 20 (10-14-23 @ 19:15) (18 - 20)  SpO2: 95% (10-14-23 @ 19:15) (95% - 97%)    General: no acute distress and well developed/thin  HENT: atraumatic, normocephalic, oropharynx pink and moist, sinuses nontender, normal nasal mucosa/turbinates, thyroid not enlarged or tender, no radiating carotid murmur or bruit and no masses; no JVD  Eyes: pupil equally round and reactive to light (PERRLA) bilaterally, extra-ocular muscle intact (EOMI) bilaterally, lids/conjunctiva normal bilaterally and anicteric bilaterally  Neck: normal, supple, no adenopathy and thyroid normal in size, no nodules or tenderness  CV: normal s1, s2 , regular rhythm, no murmurs, no rubs and no gallops  Lungs: clear to auscultation no w/r/r  Abd: normal bowel sounds, no hepatosplenomegaly, non-tender, non-distended and no masses  Musculoskeletal: no clubbing, cyanosis and full range of motion of joints: right upper extremity, left upper extremity, right lower extremity and left lower extremity;  2+ Peripheral Pulses, No clubbing, cyanosis, or edema  Neuro: alert, awake & oriented times two (AA&O x 2), cranial Nerves II-XII intact   Psych: poor judgment and insight, normal mood/affect and non-anxious  Vascular: 2+ radial and dorsalis pedis pulses B/L equal and symmetric  Skin: no rash, warm and dry

## 2023-10-14 NOTE — ED PROVIDER NOTE - CLINICAL SUMMARY MEDICAL DECISION MAKING FREE TEXT BOX
89-year-old female patient presents ED status post fall.  Will check labs, x-ray left hip and pelvis, evaluate for hip fracture.  No

## 2023-10-14 NOTE — ED PROVIDER NOTE - PROGRESS NOTE DETAILS
Possible compressed fx of left femoral head, awaiting ortho. pt resting comfortably in bed, no pain at the moment

## 2023-10-14 NOTE — ED ADULT TRIAGE NOTE - CHIEF COMPLAINT QUOTE
no Unwitnessed mechanical fall from home with c/o of L hip pain on eliquis. Denies LOC, striking head. No deformities noted. No focal neuro deficits

## 2023-10-14 NOTE — ED ADULT NURSE NOTE - CHIEF COMPLAINT QUOTE
Unwitnessed mechanical fall from home with c/o of L hip pain on eliquis. Denies LOC, striking head. No deformities noted. No focal neuro deficits

## 2023-10-14 NOTE — PATIENT PROFILE ADULT - FALL HARM RISK - HARM RISK INTERVENTIONS

## 2023-10-14 NOTE — H&P ADULT - NSICDXPASTMEDICALHX_GEN_ALL_CORE_FT
PAST MEDICAL HISTORY:  Afib     Asthma     GERD (gastroesophageal reflux disease)     H/O CHF     HLD (hyperlipidemia)     Hypertension     Hyponatremia     Hypothyroidism     Nephrolithiasis     Pacemaker

## 2023-10-14 NOTE — H&P ADULT - NSHPREVIEWOFSYSTEMS_GEN_ALL_CORE
Family denies her having any fever, chills, cough, sore throat, rhinorrhea, chest pain, SOB, abdominal pain, nausea, vomiting, dysuria, diarrhea, weakness, lethargy.  At baseline, she does not use any devices to assist with ambulation.

## 2023-10-14 NOTE — H&P ADULT - HISTORY OF PRESENT ILLNESS
This is a 89 year old female w/ PMHx of asthma, dementia AAOX2 - does not know date, CHF, atrial fibrillation on Xarelto, HTN, HLD, hypothyroidism, nephrolithiasis, and hyponatremia presents for evaluation after a fall while making her bed unwitnessed.   at bedside provides history.  States they usually make bed together, but he needed to use bathroom, so stepped away.  She got dizzy, which is a normal for her, and fell, landing on her left hip.  He doesnt know if she hit her head, but denies her having loss of consciousness.  He found her on ground.     This is a 89 year old female w/ PMHx of asthma, dementia AAOX2 - does not know date, CHF, atrial fibrillation on Xarelto, HTN, HLD, hypothyroidism, nephrolithiasis, and hyponatremia presents for evaluation after a fall while making her bed unwitnessed.   at bedside provides history.  States they usually make bed together, but he needed to use bathroom, so stepped away.  She got dizzy, which is a normal for her, and fell, landing on her left hip.  He doesn't know if she hit her head, but denies her having loss of consciousness.  He found her on ground.  He was able to get her up and call EMS.  She complained of inabilty to use leg, and hip pain upon on arrival to ED.  Currently she is denying pain.

## 2023-10-15 ENCOUNTER — TRANSCRIPTION ENCOUNTER (OUTPATIENT)
Age: 88
End: 2023-10-15

## 2023-10-15 LAB
ANION GAP SERPL CALC-SCNC: 14 MMOL/L — SIGNIFICANT CHANGE UP (ref 5–17)
APTT BLD: 36.2 SEC — HIGH (ref 24.5–35.6)
BUN SERPL-MCNC: 14.4 MG/DL — SIGNIFICANT CHANGE UP (ref 8–20)
CALCIUM SERPL-MCNC: 8.7 MG/DL — SIGNIFICANT CHANGE UP (ref 8.4–10.5)
CHLORIDE SERPL-SCNC: 96 MMOL/L — SIGNIFICANT CHANGE UP (ref 96–108)
CO2 SERPL-SCNC: 19 MMOL/L — LOW (ref 22–29)
CREAT SERPL-MCNC: 0.79 MG/DL — SIGNIFICANT CHANGE UP (ref 0.5–1.3)
EGFR: 70 ML/MIN/1.73M2 — SIGNIFICANT CHANGE UP
FERRITIN SERPL-MCNC: 370 NG/ML — HIGH (ref 13–330)
FOLATE SERPL-MCNC: 16.9 NG/ML — SIGNIFICANT CHANGE UP
GLUCOSE SERPL-MCNC: 123 MG/DL — HIGH (ref 70–99)
HCT VFR BLD CALC: 30.3 % — LOW (ref 34.5–45)
HGB BLD-MCNC: 10.2 G/DL — LOW (ref 11.5–15.5)
INR BLD: 1.41 RATIO — HIGH (ref 0.85–1.18)
IRON SATN MFR SERPL: 15 % — SIGNIFICANT CHANGE UP (ref 14–50)
IRON SATN MFR SERPL: 50 UG/DL — SIGNIFICANT CHANGE UP (ref 37–145)
MCHC RBC-ENTMCNC: 33.1 PG — SIGNIFICANT CHANGE UP (ref 27–34)
MCHC RBC-ENTMCNC: 33.7 GM/DL — SIGNIFICANT CHANGE UP (ref 32–36)
MCV RBC AUTO: 98.4 FL — SIGNIFICANT CHANGE UP (ref 80–100)
MRSA PCR RESULT.: SIGNIFICANT CHANGE UP
PLATELET # BLD AUTO: 101 K/UL — LOW (ref 150–400)
POTASSIUM SERPL-MCNC: 4.5 MMOL/L — SIGNIFICANT CHANGE UP (ref 3.5–5.3)
POTASSIUM SERPL-SCNC: 4.5 MMOL/L — SIGNIFICANT CHANGE UP (ref 3.5–5.3)
PROTHROM AB SERPL-ACNC: 15.5 SEC — HIGH (ref 9.5–13)
RBC # BLD: 3.08 M/UL — LOW (ref 3.8–5.2)
RBC # FLD: 13.1 % — SIGNIFICANT CHANGE UP (ref 10.3–14.5)
S AUREUS DNA NOSE QL NAA+PROBE: SIGNIFICANT CHANGE UP
SODIUM SERPL-SCNC: 129 MMOL/L — LOW (ref 135–145)
TIBC SERPL-MCNC: 329 UG/DL — SIGNIFICANT CHANGE UP (ref 220–430)
TRANSFERRIN SERPL-MCNC: 230 MG/DL — SIGNIFICANT CHANGE UP (ref 192–382)
VIT B12 SERPL-MCNC: 402 PG/ML — SIGNIFICANT CHANGE UP (ref 232–1245)
WBC # BLD: 5.71 K/UL — SIGNIFICANT CHANGE UP (ref 3.8–10.5)
WBC # FLD AUTO: 5.71 K/UL — SIGNIFICANT CHANGE UP (ref 3.8–10.5)

## 2023-10-15 PROCEDURE — 93010 ELECTROCARDIOGRAM REPORT: CPT

## 2023-10-15 PROCEDURE — 27235 TREAT THIGH FRACTURE: CPT | Mod: LT

## 2023-10-15 PROCEDURE — 99233 SBSQ HOSP IP/OBS HIGH 50: CPT

## 2023-10-15 DEVICE — SCREW 100X6.5: Type: IMPLANTABLE DEVICE | Status: FUNCTIONAL

## 2023-10-15 DEVICE — IMPLANTABLE DEVICE: Type: IMPLANTABLE DEVICE | Status: FUNCTIONAL

## 2023-10-15 DEVICE — GUIDE WIRE K-WIRE: Type: IMPLANTABLE DEVICE | Status: FUNCTIONAL

## 2023-10-15 RX ORDER — METOCLOPRAMIDE HCL 10 MG
10 TABLET ORAL ONCE
Refills: 0 | Status: COMPLETED | OUTPATIENT
Start: 2023-10-15 | End: 2023-10-15

## 2023-10-15 RX ORDER — CEFAZOLIN SODIUM 1 G
2000 VIAL (EA) INJECTION
Refills: 0 | Status: COMPLETED | OUTPATIENT
Start: 2023-10-15 | End: 2023-10-15

## 2023-10-15 RX ORDER — SENNA PLUS 8.6 MG/1
2 TABLET ORAL AT BEDTIME
Refills: 0 | Status: DISCONTINUED | OUTPATIENT
Start: 2023-10-15 | End: 2023-10-20

## 2023-10-15 RX ORDER — APIXABAN 2.5 MG/1
2.5 TABLET, FILM COATED ORAL
Refills: 0 | Status: DISCONTINUED | OUTPATIENT
Start: 2023-10-16 | End: 2023-10-20

## 2023-10-15 RX ORDER — TRAMADOL HYDROCHLORIDE 50 MG/1
50 TABLET ORAL EVERY 6 HOURS
Refills: 0 | Status: DISCONTINUED | OUTPATIENT
Start: 2023-10-15 | End: 2023-10-20

## 2023-10-15 RX ORDER — FENTANYL CITRATE 50 UG/ML
25 INJECTION INTRAVENOUS
Refills: 0 | Status: DISCONTINUED | OUTPATIENT
Start: 2023-10-15 | End: 2023-10-15

## 2023-10-15 RX ORDER — FENTANYL CITRATE 50 UG/ML
50 INJECTION INTRAVENOUS
Refills: 0 | Status: DISCONTINUED | OUTPATIENT
Start: 2023-10-15 | End: 2023-10-15

## 2023-10-15 RX ORDER — ONDANSETRON 8 MG/1
4 TABLET, FILM COATED ORAL ONCE
Refills: 0 | Status: COMPLETED | OUTPATIENT
Start: 2023-10-15 | End: 2023-10-15

## 2023-10-15 RX ORDER — TRAMADOL HYDROCHLORIDE 50 MG/1
25 TABLET ORAL EVERY 6 HOURS
Refills: 0 | Status: DISCONTINUED | OUTPATIENT
Start: 2023-10-15 | End: 2023-10-20

## 2023-10-15 RX ORDER — SODIUM CHLORIDE 9 MG/ML
1000 INJECTION, SOLUTION INTRAVENOUS
Refills: 0 | Status: DISCONTINUED | OUTPATIENT
Start: 2023-10-15 | End: 2023-10-15

## 2023-10-15 RX ADMIN — MUPIROCIN 1 APPLICATION(S): 20 OINTMENT TOPICAL at 18:25

## 2023-10-15 RX ADMIN — Medication 50 MICROGRAM(S): at 05:27

## 2023-10-15 RX ADMIN — Medication 2000 MILLIGRAM(S): at 13:21

## 2023-10-15 RX ADMIN — Medication 975 MILLIGRAM(S): at 22:29

## 2023-10-15 RX ADMIN — TRAMADOL HYDROCHLORIDE 50 MILLIGRAM(S): 50 TABLET ORAL at 19:27

## 2023-10-15 RX ADMIN — FENTANYL CITRATE 25 MICROGRAM(S): 50 INJECTION INTRAVENOUS at 10:16

## 2023-10-15 RX ADMIN — Medication 975 MILLIGRAM(S): at 21:29

## 2023-10-15 RX ADMIN — Medication 2000 MILLIGRAM(S): at 18:25

## 2023-10-15 RX ADMIN — SODIUM CHLORIDE 1 GRAM(S): 9 INJECTION INTRAMUSCULAR; INTRAVENOUS; SUBCUTANEOUS at 21:29

## 2023-10-15 RX ADMIN — SODIUM CHLORIDE 50 MILLILITER(S): 9 INJECTION INTRAMUSCULAR; INTRAVENOUS; SUBCUTANEOUS at 12:35

## 2023-10-15 RX ADMIN — Medication 10 MILLIGRAM(S): at 09:12

## 2023-10-15 RX ADMIN — GABAPENTIN 100 MILLIGRAM(S): 400 CAPSULE ORAL at 13:22

## 2023-10-15 RX ADMIN — GABAPENTIN 100 MILLIGRAM(S): 400 CAPSULE ORAL at 05:26

## 2023-10-15 RX ADMIN — SODIUM CHLORIDE 1 GRAM(S): 9 INJECTION INTRAMUSCULAR; INTRAVENOUS; SUBCUTANEOUS at 13:20

## 2023-10-15 RX ADMIN — Medication 50 MILLIGRAM(S): at 05:27

## 2023-10-15 RX ADMIN — GABAPENTIN 100 MILLIGRAM(S): 400 CAPSULE ORAL at 21:29

## 2023-10-15 RX ADMIN — CHLORHEXIDINE GLUCONATE 1 APPLICATION(S): 213 SOLUTION TOPICAL at 17:02

## 2023-10-15 RX ADMIN — MUPIROCIN 1 APPLICATION(S): 20 OINTMENT TOPICAL at 05:26

## 2023-10-15 RX ADMIN — SENNA PLUS 2 TABLET(S): 8.6 TABLET ORAL at 21:29

## 2023-10-15 RX ADMIN — ONDANSETRON 4 MILLIGRAM(S): 8 TABLET, FILM COATED ORAL at 09:00

## 2023-10-15 RX ADMIN — MORPHINE SULFATE 2 MILLIGRAM(S): 50 CAPSULE, EXTENDED RELEASE ORAL at 00:54

## 2023-10-15 RX ADMIN — CHLORHEXIDINE GLUCONATE 1 APPLICATION(S): 213 SOLUTION TOPICAL at 05:26

## 2023-10-15 RX ADMIN — SODIUM CHLORIDE 1 GRAM(S): 9 INJECTION INTRAMUSCULAR; INTRAVENOUS; SUBCUTANEOUS at 05:26

## 2023-10-15 RX ADMIN — FENTANYL CITRATE 25 MICROGRAM(S): 50 INJECTION INTRAVENOUS at 10:06

## 2023-10-15 RX ADMIN — SPIRONOLACTONE 25 MILLIGRAM(S): 25 TABLET, FILM COATED ORAL at 05:26

## 2023-10-15 RX ADMIN — TRAMADOL HYDROCHLORIDE 50 MILLIGRAM(S): 50 TABLET ORAL at 18:27

## 2023-10-15 RX ADMIN — Medication 50 MILLIGRAM(S): at 18:25

## 2023-10-15 NOTE — DISCHARGE NOTE PROVIDER - CARE PROVIDER_API CALL
Avelino Alcantara  Orthopaedic Surgery  [address], [address]  [place] [address]  Phone: [phone]  Fax: [phone]  Follow Up Time:    Avelino Alcantara  Orthopaedic Surgery  [address], [address]  [place] [address]  Phone: [phone]  Fax: [phone]  Follow Up Time:     Sheyla Martínez  [facility] Medicine  [address] [place] [address], [address]  [place] [address]  Phone: [phone]  Fax: [phone]  Follow Up Time:    Avelino Alcantara  Orthopaedic Surgery  [address] [facility], [address]  [place] [address]  Phone: [phone]  Fax: [phone]  Follow Up Time:     Sheyla Martínez  Family Medicine  [address], [address]  [place] [address]  Phone: [phone]  Fax: [phone]  Follow Up Time:     Erik Oneill  Nephrology  [address] [place] [address]  Phone: [phone]  Fax: [phone]  Follow Up Time:

## 2023-10-15 NOTE — DISCHARGE NOTE PROVIDER - NSDCFUADDINST_GEN_ALL_CORE_FT
The patient will be seen in the office between 2-3 weeks for wound check. Sutures/Staples/Tape will be removed at that time. Patient may shower after post-op day #5. The dressing is to be removed on day #5. The patient will contact the office if the wound becomes red, has increasing pain, develops bleeding or discharge, odor or an injury occurs, or has other concerns.  Inflammation to incision can cause pain and you can apply ice to site for 20 mins every 3-4 hours with elevating leg.    ***If there is a need for Physical Therapy this will be arranged for at home therapy.  You may receive equipment to help assist in your mobility which may include a rolling walker, commode, cane/crutches, and/or raised toilet seat.  ****If you are recommended for Subacute rehab physical therapy will be arranged upon discharge from rehab.  ****The patient will continue home dose ELIQUIS for DVTP. The patient will take omeprazole while on ASA for stomach protection.  The patient will take oxycodone and tylenol for pain control and titrate according to prescription and patient needs.  While on pain medications patient will continue senna to prevent constipation. The patient is FULL weight bearing.  Patient is not to drive until cleared by surgeon.  The patient will be seen in the office between 2-3 weeks for wound check. Sutures/Staples/Tape will be removed at that time. Patient may shower after post-op day #5. The dressing is to be removed on day #5. The patient will contact the office if the wound becomes red, has increasing pain, develops bleeding or discharge, odor or an injury occurs, or has other concerns.  Inflammation to incision can cause pain and you can apply ice to site for 20 mins every 3-4 hours with elevating leg.     While on pain medications patient will continue senna to prevent constipation. The patient is FULL weight bearing.  Patient is not to drive until cleared by surgeon.   Hold spironolactone until you have had a BMP and followed up with your PCP and has been instructed to restart it.

## 2023-10-15 NOTE — CHART NOTE - NSCHARTNOTEFT_GEN_A_CORE
Medicine PA-  Pt c/o L. hip pain 10/10 not relieved with tylenol.  Pt has ? allergy to demerol and codeine.  Pt states they made her nauseous, but had no swelling, rash or itching.  She's agreeable to try dose of morphine now.  Morphine 2mg IVP once, nurse advised to call PA if any signs of reaction.

## 2023-10-15 NOTE — DISCHARGE NOTE PROVIDER - NSDCCPCAREPLAN_GEN_ALL_CORE_FT
PRINCIPAL DISCHARGE DIAGNOSIS  Diagnosis: Fracture of femoral neck, left  Assessment and Plan of Treatment:       SECONDARY DISCHARGE DIAGNOSES  Diagnosis: Hyponatremia  Assessment and Plan of Treatment:     Diagnosis: Chronic atrial fibrillation  Assessment and Plan of Treatment:

## 2023-10-15 NOTE — PROGRESS NOTE ADULT - SUBJECTIVE AND OBJECTIVE BOX
OFELIA VILLANUEVA    [id number]    89y      Female    CC: s/p fall    INTERVAL HPI/OVERNIGHT EVENTS: pt seen and examined. s/p OR L hip perc pinning today. +nausea    REVIEW OF SYSTEMS:    CONSTITUTIONAL: No fever, weight loss  RESPIRATORY: No cough, wheezing, hemoptysis; No shortness of breath  CARDIOVASCULAR: No chest pain, palpitations  GASTROINTESTINAL: No abdominal or epigastric pain. No vomiting  NEUROLOGICAL: No headaches    Vital Signs Last 24 Hrs  T(C): 37 (15 Oct 2023 11:25), Max: 37 (15 Oct 2023 11:25)  T(F): 98.6 (15 Oct 2023 11:25), Max: 98.6 (15 Oct 2023 11:25)  HR: 81 (15 Oct 2023 11:25) (69 - 113)  BP: 126/50 (15 Oct 2023 11:25) (119/98 - 157/92)  BP(mean): 105 (15 Oct 2023 11:03) (92 - 105)  RR: 17 (15 Oct 2023 11:25) (14 - 26)  SpO2: 93% (15 Oct 2023 11:25) (93% - 100%)    Parameters below as of 15 Oct 2023 11:25  Patient On (Oxygen Delivery Method): room air        PHYSICAL EXAM:    GENERAL: NAD  HEENT: PERRL, +EOMI  CHEST/LUNG: Clear to auscultation bilaterally  HEART: S1S2+, irregular   ABDOMEN: Soft, Nontender, Nondistended; Bowel sounds present  SKIN: warm, dry  NEURO: drowsy but responding appropriately, able to make needs known, following commands       LABS:                        10.2   5.71  )-----------( 101      ( 15 Oct 2023 02:25 )             30.3     10-15    129<L>  |  96  |  14.4  ----------------------------<  123<H>  4.5   |  19.0<L>  |  0.79    Ca    8.7      15 Oct 2023 02:25    TPro  7.0  /  Alb  3.9  /  TBili  0.7  /  DBili  x   /  AST  28  /  ALT  12  /  AlkPhos  54  10-14    PT/INR - ( 15 Oct 2023 04:08 )   PT: 15.5 sec;   INR: 1.41 ratio         PTT - ( 15 Oct 2023 04:08 )  PTT:36.2 sec  Urinalysis Basic - ( 15 Oct 2023 02:25 )    Color: x / Appearance: x / SG: x / pH: x  Gluc: 123 mg/dL / Ketone: x  / Bili: x / Urobili: x   Blood: x / Protein: x / Nitrite: x   Leuk Esterase: x / RBC: x / WBC x   Sq Epi: x / Non Sq Epi: x / Bacteria: x          MEDICATIONS  (STANDING):  acetaminophen     Tablet .. 975 milliGRAM(s) Oral every 8 hours  ceFAZolin  Injectable. 2000 milliGRAM(s) IV Push <User Schedule>  chlorhexidine 2% Cloths 1 Application(s) Topical every 12 hours  gabapentin 100 milliGRAM(s) Oral three times a day  influenza  Vaccine (HIGH DOSE) 0.7 milliLiter(s) IntraMuscular once  levothyroxine 50 MICROGram(s) Oral daily  metoprolol tartrate 50 milliGRAM(s) Oral two times a day  mupirocin 2% Ointment 1 Application(s) Both Nostrils two times a day  povidone iodine 5% Nasal Swab 1 Application(s) Both Nostrils once  sodium chloride 1 Gram(s) Oral three times a day  sodium chloride 0.9%. 1000 milliLiter(s) (50 mL/Hr) IV Continuous <Continuous>  spironolactone 25 milliGRAM(s) Oral daily    MEDICATIONS  (PRN):  aluminum hydroxide/magnesium hydroxide/simethicone Suspension 30 milliLiter(s) Oral every 4 hours PRN Dyspepsia  melatonin 3 milliGRAM(s) Oral at bedtime PRN Insomnia  ondansetron Injectable 4 milliGRAM(s) IV Push every 8 hours PRN Nausea and/or Vomiting      RADIOLOGY & ADDITIONAL TESTS:

## 2023-10-15 NOTE — PROGRESS NOTE ADULT - ASSESSMENT
89y/oF PMH asthma, dementia, HFpEF, afib on Eliquis, HTN, hypothyroid, nephrolithiasis, hyponatremia presenting s/p fall; admitted w/L hip fx     L femoral neck fx   s/p fall   -CT pelvis: acute minimally displaced/varus impacted transcervical L femoral neck fx; at least mod L > R hip arthrosis  -CTH without ICH or mass effect   -ortho following   -s/p L hip perc pinning today 10/15  -pain control   -PT eval when cleared by ortho   -fall precautions   -resume Eliquis in am    anemia   -f/u am cbc     Hyponatremia, chronic   -cont to monitor   -cont salt tabs   -f/u am bmp     chronic afib   -resume eliquis in am   -cont metoprolol     HFpEF   HTN   -cont metoprolol    Hypothyroid   -cont synthroid     vte ppx: eliquis

## 2023-10-15 NOTE — DISCHARGE NOTE PROVIDER - ATTENDING DISCHARGE PHYSICAL EXAMINATION:
Vital Signs Last 24 Hrs  T(C): 36.4 (18 Oct 2023 05:00), Max: 36.7 (18 Oct 2023 00:50)  T(F): 97.5 (18 Oct 2023 05:00), Max: 98.1 (18 Oct 2023 00:50)  HR: 77 (18 Oct 2023 05:00) (71 - 86)  BP: 135/83 (18 Oct 2023 05:00) (93/59 - 142/89)  BP(mean): --  RR: 18 (18 Oct 2023 05:00) (18 - 18)  SpO2: 98% (18 Oct 2023 05:00) (95% - 98%)    Parameters below as of 18 Oct 2023 05:00  Patient On (Oxygen Delivery Method): room air    GENERAL: NAD  HEENT: PERRL, +EOMI  CHEST/LUNG: Clear to auscultation bilaterally  HEART: S1S2+, irregular   ABDOMEN: Soft, Nontender, Nondistended; Bowel sounds present  SKIN: warm, dry  MSK: LLE dressing c/d/i  NEURO: awake, alert  GENERAL: NAD  HEENT: PERRL, +EOMI  CHEST/LUNG: Clear to auscultation bilaterally  HEART: S1S2+, irregular   ABDOMEN: Soft, Nontender, Nondistended.  NEURO: awake, alert

## 2023-10-15 NOTE — PROGRESS NOTE ADULT - SUBJECTIVE AND OBJECTIVE BOX
ORTHOPEDIC POST-OP PROGRESS NOTE:  Name: OFELIA VILLANUEVA    MR #: [id number]    Procedure: Intramedulary nail of left hip  Surgeon: Dr. Sprague  DOS: 10/15/23      Pt comfortable without complaints, pain controlled. Denies CP, SOB, N/V, numbness/tingling. Patient has not worked with Physical Therapy at time of examination.         Vital Signs Last 24 Hrs  T(C): 36.7 (10-15-23 @ 17:54), Max: 36.7 (10-15-23 @ 17:54)  T(F): 98.1 (10-15-23 @ 17:54), Max: 98.1 (10-15-23 @ 17:54)  HR: 79 (10-15-23 @ 17:54) (79 - 79)  BP: 145/78 (10-15-23 @ 17:54) (145/78 - 145/78)  BP(mean): --  RR: 18 (10-15-23 @ 17:54) (18 - 18)  SpO2: 94% (10-15-23 @ 17:54) (94% - 94%)      General Exam:    General: Pt Alert and oriented, NAD, controlled pain.    Dressings C/D/I. No bleeding. No erythema.    Pulses: + dorsalis pedis pulse. Cap refill < 2 sec.    Sensation: Grossly intact to light touch without deficit.    Motor: +EHL/FHL/AT/GC        A/P: 89y Female  POD# 0 s/p left hip pinning    - Stable  - Pain Control  - DVT ppx as prescribed  - PT eval  - Weight bearing status: WBAT

## 2023-10-15 NOTE — DISCHARGE NOTE PROVIDER - PROVIDER TOKENS
PROVIDER:[TOKEN:[[id number]]] PROVIDER:[TOKEN:[[id number]]],PROVIDER:[TOKEN:[[id number]:[id number]]] PROVIDER:[TOKEN:[[id number]]],PROVIDER:[TOKEN:[[id number]:[id number]]],PROVIDER:[TOKEN:[[id number]]]

## 2023-10-15 NOTE — DISCHARGE NOTE PROVIDER - HOSPITAL COURSE
89y/oF PMH asthma, dementia, HFpEF, afib on Eliquis, HTN, hypothyroid, nephrolithiasis, hyponatremia presenting s/p fall; admitted w/L hip fx 89y/oF PMH asthma, dementia, HFpEF, afib on Eliquis, HTN, hypothyroid, nephrolithiasis, hyponatremia presenting s/p fall; admitted w/L hip fx. Now s/p L hip perc pinning 10/15 89y/oF PMH asthma, dementia, HFpEF, afib on Eliquis, HTN, hypothyroid, nephrolithiasis, hyponatremia presenting s/p fall; admitted w/L hip fx. Now s/p L hip perc pinning 10/15. Pain controlled. PT eval rec home PT. 89y/oF PMH asthma, dementia, HFpEF, afib on Eliquis, HTN, hypothyroid, nephrolithiasis, hyponatremia presenting s/p fall; admitted w/L hip fx. Now s/p L hip perc pinning 10/15. Pain controlled. electrolytes monitored. PT eval rec home PT. 89y/oF PMH asthma, dementia, HFpEF, afib on Eliquis, HTN, hypothyroid, nephrolithiasis, hyponatremia presenting s/p fall; admitted w/L hip fx. Now s/p L hip perc pinning 10/15. Pain controlled. she was noticed to have hyponatremia and sodium worsened to 122. she appears to have prior h/o hyponatremia. nephrology was consulted and she was given NS2% infusion for 6 hours and started on sodium chloride tablets. sodium increased to 125 and has sustained there on repeat check this morning. urine studies consistent with SIADH. will discharge on sodium chloride tablet 1G TID and check BMP in one week. will hold spironolactone until BMP and outpatient PCP follow up is completed. PT nick rec home PT. No

## 2023-10-16 LAB
ANION GAP SERPL CALC-SCNC: 15 MMOL/L — SIGNIFICANT CHANGE UP (ref 5–17)
BASOPHILS # BLD AUTO: 0.03 K/UL — SIGNIFICANT CHANGE UP (ref 0–0.2)
BASOPHILS NFR BLD AUTO: 0.4 % — SIGNIFICANT CHANGE UP (ref 0–2)
BUN SERPL-MCNC: 10.8 MG/DL — SIGNIFICANT CHANGE UP (ref 8–20)
CALCIUM SERPL-MCNC: 8.5 MG/DL — SIGNIFICANT CHANGE UP (ref 8.4–10.5)
CHLORIDE SERPL-SCNC: 94 MMOL/L — LOW (ref 96–108)
CO2 SERPL-SCNC: 19 MMOL/L — LOW (ref 22–29)
CREAT SERPL-MCNC: 0.79 MG/DL — SIGNIFICANT CHANGE UP (ref 0.5–1.3)
EGFR: 70 ML/MIN/1.73M2 — SIGNIFICANT CHANGE UP
EOSINOPHIL # BLD AUTO: 0.26 K/UL — SIGNIFICANT CHANGE UP (ref 0–0.5)
EOSINOPHIL NFR BLD AUTO: 3.7 % — SIGNIFICANT CHANGE UP (ref 0–6)
GLUCOSE SERPL-MCNC: 100 MG/DL — HIGH (ref 70–99)
HCT VFR BLD CALC: 30.8 % — LOW (ref 34.5–45)
HGB BLD-MCNC: 10.6 G/DL — LOW (ref 11.5–15.5)
IMM GRANULOCYTES NFR BLD AUTO: 0.3 % — SIGNIFICANT CHANGE UP (ref 0–0.9)
LYMPHOCYTES # BLD AUTO: 1.32 K/UL — SIGNIFICANT CHANGE UP (ref 1–3.3)
LYMPHOCYTES # BLD AUTO: 18.9 % — SIGNIFICANT CHANGE UP (ref 13–44)
MAGNESIUM SERPL-MCNC: 1.3 MG/DL — LOW (ref 1.6–2.6)
MCHC RBC-ENTMCNC: 33.8 PG — SIGNIFICANT CHANGE UP (ref 27–34)
MCHC RBC-ENTMCNC: 34.4 GM/DL — SIGNIFICANT CHANGE UP (ref 32–36)
MCV RBC AUTO: 98.1 FL — SIGNIFICANT CHANGE UP (ref 80–100)
MONOCYTES # BLD AUTO: 0.69 K/UL — SIGNIFICANT CHANGE UP (ref 0–0.9)
MONOCYTES NFR BLD AUTO: 9.9 % — SIGNIFICANT CHANGE UP (ref 2–14)
NEUTROPHILS # BLD AUTO: 4.68 K/UL — SIGNIFICANT CHANGE UP (ref 1.8–7.4)
NEUTROPHILS NFR BLD AUTO: 66.8 % — SIGNIFICANT CHANGE UP (ref 43–77)
PLATELET # BLD AUTO: 100 K/UL — LOW (ref 150–400)
POTASSIUM SERPL-MCNC: 4.2 MMOL/L — SIGNIFICANT CHANGE UP (ref 3.5–5.3)
POTASSIUM SERPL-SCNC: 4.2 MMOL/L — SIGNIFICANT CHANGE UP (ref 3.5–5.3)
RBC # BLD: 3.14 M/UL — LOW (ref 3.8–5.2)
RBC # FLD: 13.1 % — SIGNIFICANT CHANGE UP (ref 10.3–14.5)
SODIUM SERPL-SCNC: 128 MMOL/L — LOW (ref 135–145)
WBC # BLD: 7 K/UL — SIGNIFICANT CHANGE UP (ref 3.8–10.5)
WBC # FLD AUTO: 7 K/UL — SIGNIFICANT CHANGE UP (ref 3.8–10.5)

## 2023-10-16 PROCEDURE — 99233 SBSQ HOSP IP/OBS HIGH 50: CPT

## 2023-10-16 RX ORDER — MAGNESIUM SULFATE 500 MG/ML
2 VIAL (ML) INJECTION ONCE
Refills: 0 | Status: COMPLETED | OUTPATIENT
Start: 2023-10-16 | End: 2023-10-16

## 2023-10-16 RX ORDER — LIDOCAINE 4 G/100G
1 CREAM TOPICAL DAILY
Refills: 0 | Status: DISCONTINUED | OUTPATIENT
Start: 2023-10-16 | End: 2023-10-20

## 2023-10-16 RX ORDER — CYCLOBENZAPRINE HYDROCHLORIDE 10 MG/1
5 TABLET, FILM COATED ORAL
Refills: 0 | Status: DISCONTINUED | OUTPATIENT
Start: 2023-10-16 | End: 2023-10-20

## 2023-10-16 RX ADMIN — SENNA PLUS 2 TABLET(S): 8.6 TABLET ORAL at 22:46

## 2023-10-16 RX ADMIN — SODIUM CHLORIDE 1 GRAM(S): 9 INJECTION INTRAMUSCULAR; INTRAVENOUS; SUBCUTANEOUS at 13:12

## 2023-10-16 RX ADMIN — GABAPENTIN 100 MILLIGRAM(S): 400 CAPSULE ORAL at 05:11

## 2023-10-16 RX ADMIN — Medication 975 MILLIGRAM(S): at 13:11

## 2023-10-16 RX ADMIN — Medication 25 GRAM(S): at 08:51

## 2023-10-16 RX ADMIN — SODIUM CHLORIDE 1 GRAM(S): 9 INJECTION INTRAMUSCULAR; INTRAVENOUS; SUBCUTANEOUS at 22:46

## 2023-10-16 RX ADMIN — Medication 50 MICROGRAM(S): at 05:11

## 2023-10-16 RX ADMIN — Medication 975 MILLIGRAM(S): at 23:00

## 2023-10-16 RX ADMIN — APIXABAN 2.5 MILLIGRAM(S): 2.5 TABLET, FILM COATED ORAL at 18:02

## 2023-10-16 RX ADMIN — MUPIROCIN 1 APPLICATION(S): 20 OINTMENT TOPICAL at 05:12

## 2023-10-16 RX ADMIN — APIXABAN 2.5 MILLIGRAM(S): 2.5 TABLET, FILM COATED ORAL at 05:11

## 2023-10-16 RX ADMIN — TRAMADOL HYDROCHLORIDE 50 MILLIGRAM(S): 50 TABLET ORAL at 08:55

## 2023-10-16 RX ADMIN — Medication 975 MILLIGRAM(S): at 14:37

## 2023-10-16 RX ADMIN — TRAMADOL HYDROCHLORIDE 50 MILLIGRAM(S): 50 TABLET ORAL at 10:29

## 2023-10-16 RX ADMIN — Medication 5 MILLIGRAM(S): at 18:02

## 2023-10-16 RX ADMIN — GABAPENTIN 100 MILLIGRAM(S): 400 CAPSULE ORAL at 22:46

## 2023-10-16 RX ADMIN — Medication 975 MILLIGRAM(S): at 05:12

## 2023-10-16 RX ADMIN — Medication 30 MILLILITER(S): at 13:20

## 2023-10-16 RX ADMIN — ONDANSETRON 4 MILLIGRAM(S): 8 TABLET, FILM COATED ORAL at 23:01

## 2023-10-16 RX ADMIN — Medication 50 MILLIGRAM(S): at 05:11

## 2023-10-16 RX ADMIN — Medication 50 MILLIGRAM(S): at 18:02

## 2023-10-16 RX ADMIN — SODIUM CHLORIDE 1 GRAM(S): 9 INJECTION INTRAMUSCULAR; INTRAVENOUS; SUBCUTANEOUS at 05:12

## 2023-10-16 RX ADMIN — Medication 975 MILLIGRAM(S): at 22:46

## 2023-10-16 RX ADMIN — MUPIROCIN 1 APPLICATION(S): 20 OINTMENT TOPICAL at 17:57

## 2023-10-16 RX ADMIN — Medication 975 MILLIGRAM(S): at 06:12

## 2023-10-16 NOTE — OCCUPATIONAL THERAPY INITIAL EVALUATION ADULT - PERTINENT HX OF CURRENT PROBLEM, REHAB EVAL
As per MD note: This is a 89 year old female w/ PMHx of asthma, dementia AAOX2 - does not know date, CHF, atrial fibrillation on Xarelto, HTN, HLD, hypothyroidism, nephrolithiasis, and hyponatremia presents for evaluation after a fall while making her bed unwitnessed.   at bedside provides history.  States they usually make bed together, but he needed to use bathroom, so stepped away.  She got dizzy, which is a normal for her, and fell, landing on her left hip.  He doesn't know if she hit her head, but denies her having loss of consciousness.  He found her on ground.  He was able to get her up and call EMS.  She complained of inabilty to use leg, and hip pain upon on arrival to ED.

## 2023-10-16 NOTE — PHYSICAL THERAPY INITIAL EVALUATION ADULT - ACTIVE RANGE OF MOTION EXAMINATION, REHAB EVAL
Left LE grossly 2/5/bilateral upper extremity Active ROM was WFL (within functional limits)/bilateral  lower extremity Active ROM was WFL (within functional limits)

## 2023-10-16 NOTE — OCCUPATIONAL THERAPY INITIAL EVALUATION ADULT - ADDITIONAL COMMENTS
Pt lives in a condo with her  who is able to assist as needed. There is a private elevator in the building and all needs are accessible on the first level.  Bathroom has a walk-in shower.  Pt does not own any DME.  Pt is right handed.

## 2023-10-16 NOTE — PROGRESS NOTE ADULT - ASSESSMENT
89y/oF PMH asthma, dementia, HFpEF, afib on Eliquis, HTN, hypothyroid, nephrolithiasis, hyponatremia presenting s/p fall; admitted w/L hip fx     L femoral neck fx   s/p fall   -CT pelvis: acute minimally displaced/varus impacted transcervical L femoral neck fx; at least mod L > R hip arthrosis  -CTH without ICH or mass effect   -ortho following   -s/p L hip perc pinning today 10/15  -pain control   -PT eval pending  -WBAT   -fall precautions   -Eliquis resumed     anemia   -f/u am cbc     Hyponatremia, chronic   -cont to monitor   -cont salt tabs   -f/u am bmp     chronic afib   -cont eliquis   -cont metoprolol     HFpEF   HTN   -cont metoprolol    Hypothyroid   -cont synthroid     vte ppx: eliquis

## 2023-10-16 NOTE — PROGRESS NOTE ADULT - SUBJECTIVE AND OBJECTIVE BOX
OFELIA VILLANUEVA    [id number]    89y      Female    CC: s/p fall     INTERVAL HPI/OVERNIGHT EVENTS: pt seen and examined. no acute events reported o/n. pain controlled. pt's daughter at bedside.     REVIEW OF SYSTEMS:    CONSTITUTIONAL: No weight loss  RESPIRATORY: No cough, wheezing, hemoptysis; No shortness of breath  CARDIOVASCULAR: No chest pain, palpitations  GASTROINTESTINAL: No abdominal or epigastric pain. No vomiting  NEUROLOGICAL: No headaches    Vital Signs Last 24 Hrs  T(C): 36.6 (16 Oct 2023 08:34), Max: 36.7 (15 Oct 2023 17:54)  T(F): 97.8 (16 Oct 2023 08:34), Max: 98.1 (15 Oct 2023 17:54)  HR: 90 (16 Oct 2023 08:34) (79 - 90)  BP: 125/76 (16 Oct 2023 08:34) (125/76 - 145/78)  BP(mean): --  RR: 18 (16 Oct 2023 08:34) (18 - 18)  SpO2: 95% (16 Oct 2023 08:34) (92% - 95%)    Parameters below as of 16 Oct 2023 08:34  Patient On (Oxygen Delivery Method): room air        PHYSICAL EXAM:    GENERAL: NAD  HEENT: PERRL, +EOMI  CHEST/LUNG: Clear to auscultation bilaterally  HEART: S1S2+, irregular   ABDOMEN: Soft, Nontender, Nondistended; Bowel sounds present  SKIN: warm, dry  MSK: LLE dressing c/d/i  NEURO: awake, alert     LABS:                        10.6   7.00  )-----------( 100      ( 16 Oct 2023 05:26 )             30.8     10-16    128<L>  |  94<L>  |  10.8  ----------------------------<  100<H>  4.2   |  19.0<L>  |  0.79    Ca    8.5      16 Oct 2023 05:26  Mg     1.3     10-16    TPro  7.0  /  Alb  3.9  /  TBili  0.7  /  DBili  x   /  AST  28  /  ALT  12  /  AlkPhos  54  10-14    PT/INR - ( 15 Oct 2023 04:08 )   PT: 15.5 sec;   INR: 1.41 ratio         PTT - ( 15 Oct 2023 04:08 )  PTT:36.2 sec  Urinalysis Basic - ( 16 Oct 2023 05:26 )    Color: x / Appearance: x / SG: x / pH: x  Gluc: 100 mg/dL / Ketone: x  / Bili: x / Urobili: x   Blood: x / Protein: x / Nitrite: x   Leuk Esterase: x / RBC: x / WBC x   Sq Epi: x / Non Sq Epi: x / Bacteria: x          MEDICATIONS  (STANDING):  acetaminophen     Tablet .. 975 milliGRAM(s) Oral every 8 hours  apixaban 2.5 milliGRAM(s) Oral two times a day  gabapentin 100 milliGRAM(s) Oral three times a day  influenza  Vaccine (HIGH DOSE) 0.7 milliLiter(s) IntraMuscular once  levothyroxine 50 MICROGram(s) Oral daily  metoprolol tartrate 50 milliGRAM(s) Oral two times a day  mupirocin 2% Ointment 1 Application(s) Both Nostrils two times a day  povidone iodine 5% Nasal Swab 1 Application(s) Both Nostrils once  senna 2 Tablet(s) Oral at bedtime  sodium chloride 1 Gram(s) Oral three times a day  sodium chloride 0.9%. 1000 milliLiter(s) (50 mL/Hr) IV Continuous <Continuous>    MEDICATIONS  (PRN):  aluminum hydroxide/magnesium hydroxide/simethicone Suspension 30 milliLiter(s) Oral every 4 hours PRN Dyspepsia  melatonin 3 milliGRAM(s) Oral at bedtime PRN Insomnia  ondansetron Injectable 4 milliGRAM(s) IV Push every 8 hours PRN Nausea and/or Vomiting  traMADol 25 milliGRAM(s) Oral every 6 hours PRN Moderate Pain (4 - 6)  traMADol 50 milliGRAM(s) Oral every 6 hours PRN Severe Pain (7 - 10)      RADIOLOGY & ADDITIONAL TESTS:

## 2023-10-16 NOTE — PROGRESS NOTE ADULT - SUBJECTIVE AND OBJECTIVE BOX
Patient seen and examined at bedside. comfortable in bed, pain controlled. Denies fever/chills, SOB/chest pain, abdominal pain, numbness/tingling. no complaints.    Vital Signs Last 24 Hrs  T(C): 36.4 (16 Oct 2023 04:43), Max: 37 (15 Oct 2023 11:25)  T(F): 97.5 (16 Oct 2023 04:43), Max: 98.6 (15 Oct 2023 11:25)  HR: 88 (16 Oct 2023 04:43) (79 - 113)  BP: 133/83 (16 Oct 2023 04:43) (119/98 - 157/80)  BP(mean): 105 (15 Oct 2023 11:03) (92 - 105)  RR: 18 (16 Oct 2023 04:43) (14 - 26)  SpO2: 92% (16 Oct 2023 04:43) (92% - 100%)    Parameters below as of 16 Oct 2023 04:43  Patient On (Oxygen Delivery Method): room air    General: NAD  LLE: Mepilex dressing C/D/I. SILT. + dorsi/plantarflexion. DP 2+. calf soft NT B/L                          10.2   5.71  )-----------( 101      ( 15 Oct 2023 02:25 )             30.3     A/P: 89 y.o F s/p left hip perc pinning POD #1  - WBAT  - DVTP  - [facility] care primary team

## 2023-10-16 NOTE — PHYSICAL THERAPY INITIAL EVALUATION ADULT - ADDITIONAL COMMENTS
Pt lives in a house with 0 steps to enter and 0  stairs inside.  Pt owns medical equipment: SAC   Someone is always available to provide assist.

## 2023-10-17 LAB
ANION GAP SERPL CALC-SCNC: 13 MMOL/L — SIGNIFICANT CHANGE UP (ref 5–17)
ANION GAP SERPL CALC-SCNC: 13 MMOL/L — SIGNIFICANT CHANGE UP (ref 5–17)
ANION GAP SERPL CALC-SCNC: 14 MMOL/L — SIGNIFICANT CHANGE UP (ref 5–17)
ANION GAP SERPL CALC-SCNC: 14 MMOL/L — SIGNIFICANT CHANGE UP (ref 5–17)
BUN SERPL-MCNC: 11.9 MG/DL — SIGNIFICANT CHANGE UP (ref 8–20)
BUN SERPL-MCNC: 11.9 MG/DL — SIGNIFICANT CHANGE UP (ref 8–20)
BUN SERPL-MCNC: 13.4 MG/DL — SIGNIFICANT CHANGE UP (ref 8–20)
BUN SERPL-MCNC: 13.4 MG/DL — SIGNIFICANT CHANGE UP (ref 8–20)
CALCIUM SERPL-MCNC: 8 MG/DL — LOW (ref 8.4–10.5)
CHLORIDE SERPL-SCNC: 89 MMOL/L — LOW (ref 96–108)
CHLORIDE SERPL-SCNC: 89 MMOL/L — LOW (ref 96–108)
CHLORIDE SERPL-SCNC: 90 MMOL/L — LOW (ref 96–108)
CHLORIDE SERPL-SCNC: 90 MMOL/L — LOW (ref 96–108)
CO2 SERPL-SCNC: 18 MMOL/L — LOW (ref 22–29)
CO2 SERPL-SCNC: 18 MMOL/L — LOW (ref 22–29)
CO2 SERPL-SCNC: 20 MMOL/L — LOW (ref 22–29)
CO2 SERPL-SCNC: 20 MMOL/L — LOW (ref 22–29)
CREAT SERPL-MCNC: 0.8 MG/DL — SIGNIFICANT CHANGE UP (ref 0.5–1.3)
CREAT SERPL-MCNC: 0.8 MG/DL — SIGNIFICANT CHANGE UP (ref 0.5–1.3)
CREAT SERPL-MCNC: 0.85 MG/DL — SIGNIFICANT CHANGE UP (ref 0.5–1.3)
CREAT SERPL-MCNC: 0.85 MG/DL — SIGNIFICANT CHANGE UP (ref 0.5–1.3)
EGFR: 64 ML/MIN/1.73M2 — SIGNIFICANT CHANGE UP
EGFR: 64 ML/MIN/1.73M2 — SIGNIFICANT CHANGE UP
EGFR: 69 ML/MIN/1.73M2 — SIGNIFICANT CHANGE UP
EGFR: 69 ML/MIN/1.73M2 — SIGNIFICANT CHANGE UP
GLUCOSE SERPL-MCNC: 106 MG/DL — HIGH (ref 70–99)
GLUCOSE SERPL-MCNC: 106 MG/DL — HIGH (ref 70–99)
GLUCOSE SERPL-MCNC: 95 MG/DL — SIGNIFICANT CHANGE UP (ref 70–99)
GLUCOSE SERPL-MCNC: 95 MG/DL — SIGNIFICANT CHANGE UP (ref 70–99)
HCT VFR BLD CALC: 28 % — LOW (ref 34.5–45)
HCT VFR BLD CALC: 28 % — LOW (ref 34.5–45)
HCT VFR BLD CALC: 28.6 % — LOW (ref 34.5–45)
HCT VFR BLD CALC: 28.6 % — LOW (ref 34.5–45)
HGB BLD-MCNC: 9.7 G/DL — LOW (ref 11.5–15.5)
HGB BLD-MCNC: 9.7 G/DL — LOW (ref 11.5–15.5)
HGB BLD-MCNC: 9.9 G/DL — LOW (ref 11.5–15.5)
HGB BLD-MCNC: 9.9 G/DL — LOW (ref 11.5–15.5)
MAGNESIUM SERPL-MCNC: 1.8 MG/DL — SIGNIFICANT CHANGE UP (ref 1.6–2.6)
MAGNESIUM SERPL-MCNC: 1.8 MG/DL — SIGNIFICANT CHANGE UP (ref 1.6–2.6)
MCHC RBC-ENTMCNC: 33.4 PG — SIGNIFICANT CHANGE UP (ref 27–34)
MCHC RBC-ENTMCNC: 33.4 PG — SIGNIFICANT CHANGE UP (ref 27–34)
MCHC RBC-ENTMCNC: 33.9 PG — SIGNIFICANT CHANGE UP (ref 27–34)
MCHC RBC-ENTMCNC: 33.9 PG — SIGNIFICANT CHANGE UP (ref 27–34)
MCHC RBC-ENTMCNC: 34.6 GM/DL — SIGNIFICANT CHANGE UP (ref 32–36)
MCV RBC AUTO: 96.6 FL — SIGNIFICANT CHANGE UP (ref 80–100)
MCV RBC AUTO: 96.6 FL — SIGNIFICANT CHANGE UP (ref 80–100)
MCV RBC AUTO: 97.9 FL — SIGNIFICANT CHANGE UP (ref 80–100)
MCV RBC AUTO: 97.9 FL — SIGNIFICANT CHANGE UP (ref 80–100)
PHOSPHATE SERPL-MCNC: 2 MG/DL — LOW (ref 2.4–4.7)
PHOSPHATE SERPL-MCNC: 2 MG/DL — LOW (ref 2.4–4.7)
PLATELET # BLD AUTO: 100 K/UL — LOW (ref 150–400)
PLATELET # BLD AUTO: 100 K/UL — LOW (ref 150–400)
PLATELET # BLD AUTO: 120 K/UL — LOW (ref 150–400)
PLATELET # BLD AUTO: 120 K/UL — LOW (ref 150–400)
POTASSIUM SERPL-MCNC: 4.1 MMOL/L — SIGNIFICANT CHANGE UP (ref 3.5–5.3)
POTASSIUM SERPL-MCNC: 4.1 MMOL/L — SIGNIFICANT CHANGE UP (ref 3.5–5.3)
POTASSIUM SERPL-MCNC: 4.3 MMOL/L — SIGNIFICANT CHANGE UP (ref 3.5–5.3)
POTASSIUM SERPL-MCNC: 4.3 MMOL/L — SIGNIFICANT CHANGE UP (ref 3.5–5.3)
POTASSIUM SERPL-SCNC: 4.1 MMOL/L — SIGNIFICANT CHANGE UP (ref 3.5–5.3)
POTASSIUM SERPL-SCNC: 4.1 MMOL/L — SIGNIFICANT CHANGE UP (ref 3.5–5.3)
POTASSIUM SERPL-SCNC: 4.3 MMOL/L — SIGNIFICANT CHANGE UP (ref 3.5–5.3)
POTASSIUM SERPL-SCNC: 4.3 MMOL/L — SIGNIFICANT CHANGE UP (ref 3.5–5.3)
RBC # BLD: 2.86 M/UL — LOW (ref 3.8–5.2)
RBC # BLD: 2.86 M/UL — LOW (ref 3.8–5.2)
RBC # BLD: 2.96 M/UL — LOW (ref 3.8–5.2)
RBC # BLD: 2.96 M/UL — LOW (ref 3.8–5.2)
RBC # FLD: 13 % — SIGNIFICANT CHANGE UP (ref 10.3–14.5)
RBC # FLD: 13 % — SIGNIFICANT CHANGE UP (ref 10.3–14.5)
RBC # FLD: 13.1 % — SIGNIFICANT CHANGE UP (ref 10.3–14.5)
RBC # FLD: 13.1 % — SIGNIFICANT CHANGE UP (ref 10.3–14.5)
SODIUM SERPL-SCNC: 121 MMOL/L — LOW (ref 135–145)
SODIUM SERPL-SCNC: 121 MMOL/L — LOW (ref 135–145)
SODIUM SERPL-SCNC: 123 MMOL/L — LOW (ref 135–145)
SODIUM SERPL-SCNC: 123 MMOL/L — LOW (ref 135–145)
WBC # BLD: 6.17 K/UL — SIGNIFICANT CHANGE UP (ref 3.8–10.5)
WBC # BLD: 6.17 K/UL — SIGNIFICANT CHANGE UP (ref 3.8–10.5)
WBC # BLD: 7.42 K/UL — SIGNIFICANT CHANGE UP (ref 3.8–10.5)
WBC # BLD: 7.42 K/UL — SIGNIFICANT CHANGE UP (ref 3.8–10.5)
WBC # FLD AUTO: 6.17 K/UL — SIGNIFICANT CHANGE UP (ref 3.8–10.5)
WBC # FLD AUTO: 6.17 K/UL — SIGNIFICANT CHANGE UP (ref 3.8–10.5)
WBC # FLD AUTO: 7.42 K/UL — SIGNIFICANT CHANGE UP (ref 3.8–10.5)
WBC # FLD AUTO: 7.42 K/UL — SIGNIFICANT CHANGE UP (ref 3.8–10.5)

## 2023-10-17 PROCEDURE — 99232 SBSQ HOSP IP/OBS MODERATE 35: CPT

## 2023-10-17 RX ORDER — MAGNESIUM SULFATE 500 MG/ML
1 VIAL (ML) INJECTION ONCE
Refills: 0 | Status: COMPLETED | OUTPATIENT
Start: 2023-10-17 | End: 2023-10-17

## 2023-10-17 RX ORDER — SODIUM CHLORIDE 9 MG/ML
1000 INJECTION, SOLUTION INTRAVENOUS
Refills: 0 | Status: DISCONTINUED | OUTPATIENT
Start: 2023-10-17 | End: 2023-10-19

## 2023-10-17 RX ADMIN — Medication 50 MILLIGRAM(S): at 17:19

## 2023-10-17 RX ADMIN — LIDOCAINE 1 PATCH: 4 CREAM TOPICAL at 23:00

## 2023-10-17 RX ADMIN — APIXABAN 2.5 MILLIGRAM(S): 2.5 TABLET, FILM COATED ORAL at 05:41

## 2023-10-17 RX ADMIN — GABAPENTIN 100 MILLIGRAM(S): 400 CAPSULE ORAL at 22:01

## 2023-10-17 RX ADMIN — Medication 975 MILLIGRAM(S): at 22:00

## 2023-10-17 RX ADMIN — Medication 50 MILLIGRAM(S): at 05:41

## 2023-10-17 RX ADMIN — SODIUM CHLORIDE 1 GRAM(S): 9 INJECTION INTRAMUSCULAR; INTRAVENOUS; SUBCUTANEOUS at 05:41

## 2023-10-17 RX ADMIN — MUPIROCIN 1 APPLICATION(S): 20 OINTMENT TOPICAL at 17:19

## 2023-10-17 RX ADMIN — MUPIROCIN 1 APPLICATION(S): 20 OINTMENT TOPICAL at 05:45

## 2023-10-17 RX ADMIN — Medication 85 MILLIMOLE(S): at 08:35

## 2023-10-17 RX ADMIN — ONDANSETRON 4 MILLIGRAM(S): 8 TABLET, FILM COATED ORAL at 13:32

## 2023-10-17 RX ADMIN — GABAPENTIN 100 MILLIGRAM(S): 400 CAPSULE ORAL at 13:17

## 2023-10-17 RX ADMIN — GABAPENTIN 100 MILLIGRAM(S): 400 CAPSULE ORAL at 05:41

## 2023-10-17 RX ADMIN — LIDOCAINE 1 PATCH: 4 CREAM TOPICAL at 19:00

## 2023-10-17 RX ADMIN — SODIUM CHLORIDE 50 MILLILITER(S): 9 INJECTION INTRAMUSCULAR; INTRAVENOUS; SUBCUTANEOUS at 05:43

## 2023-10-17 RX ADMIN — Medication 975 MILLIGRAM(S): at 23:00

## 2023-10-17 RX ADMIN — LIDOCAINE 1 PATCH: 4 CREAM TOPICAL at 11:45

## 2023-10-17 RX ADMIN — SODIUM CHLORIDE 1 GRAM(S): 9 INJECTION INTRAMUSCULAR; INTRAVENOUS; SUBCUTANEOUS at 22:01

## 2023-10-17 RX ADMIN — SODIUM CHLORIDE 75 MILLILITER(S): 9 INJECTION, SOLUTION INTRAVENOUS at 08:35

## 2023-10-17 RX ADMIN — Medication 975 MILLIGRAM(S): at 05:41

## 2023-10-17 RX ADMIN — APIXABAN 2.5 MILLIGRAM(S): 2.5 TABLET, FILM COATED ORAL at 17:19

## 2023-10-17 RX ADMIN — SENNA PLUS 2 TABLET(S): 8.6 TABLET ORAL at 22:01

## 2023-10-17 RX ADMIN — Medication 50 MICROGRAM(S): at 05:41

## 2023-10-17 RX ADMIN — Medication 100 GRAM(S): at 08:35

## 2023-10-17 RX ADMIN — SODIUM CHLORIDE 1 GRAM(S): 9 INJECTION INTRAMUSCULAR; INTRAVENOUS; SUBCUTANEOUS at 13:17

## 2023-10-17 RX ADMIN — Medication 975 MILLIGRAM(S): at 06:00

## 2023-10-17 NOTE — PROGRESS NOTE ADULT - SUBJECTIVE AND OBJECTIVE BOX
Pt Name: OFELIA VILLANUEVA    MRN: [id number]    Patient is a being followed for left hip CRPP POD#2. Patient reports she is participating in physical therapy. Denies numbness/tingling. No other complaints at this time.     PAST MEDICAL & SURGICAL HISTORY:  PAST MEDICAL & SURGICAL HISTORY:  Hypertension    Pacemaker    GERD (gastroesophageal reflux disease)    Afib    HLD (hyperlipidemia)    Asthma    H/O CHF    Hypothyroidism    Nephrolithiasis    Hyponatremia    H/O: hysterectomy    H/O laminectomy  lumbar          Allergies: Robaxin (Other)  shellfish (Rash)  Demerol HCl (Nausea)  codeine (Unknown)  Bactrim (Other)      Medications: acetaminophen     Tablet .. 975 milliGRAM(s) Oral every 8 hours  aluminum hydroxide/magnesium hydroxide/simethicone Suspension 30 milliLiter(s) Oral every 4 hours PRN  apixaban 2.5 milliGRAM(s) Oral two times a day  bisacodyl 5 milliGRAM(s) Oral every 12 hours PRN  cyclobenzaprine 5 milliGRAM(s) Oral two times a day PRN  gabapentin 100 milliGRAM(s) Oral three times a day  influenza  Vaccine (HIGH DOSE) 0.7 milliLiter(s) IntraMuscular once  levothyroxine 50 MICROGram(s) Oral daily  lidocaine   4% Patch 1 Patch Transdermal daily  magnesium sulfate  IVPB 1 Gram(s) IV Intermittent once  melatonin 3 milliGRAM(s) Oral at bedtime PRN  metoprolol tartrate 50 milliGRAM(s) Oral two times a day  mupirocin 2% Ointment 1 Application(s) Both Nostrils two times a day  ondansetron Injectable 4 milliGRAM(s) IV Push every 8 hours PRN  povidone iodine 5% Nasal Swab 1 Application(s) Both Nostrils once  senna 2 Tablet(s) Oral at bedtime  sodium chloride 1 Gram(s) Oral three times a day  sodium chloride 0.45% 1000 milliLiter(s) IV Continuous <Continuous>  sodium phosphate 30 milliMole(s)/500 mL IVPB 30 milliMole(s) IV Intermittent once  traMADol 25 milliGRAM(s) Oral every 6 hours PRN  traMADol 50 milliGRAM(s) Oral every 6 hours PRN                        9.9    6.17  )-----------( 100      ( 17 Oct 2023 05:32 )             28.6     10-17    121<L>  |  89<L>  |  11.9  ----------------------------<  95  4.1   |  18.0<L>  |  0.80    Ca    8.0<L>      17 Oct 2023 05:32  Phos  2.0     10-17  Mg     1.8     10-17        PHYSICAL EXAM:    Vital Signs Last 24 Hrs  T(C): 36.8 (17 Oct 2023 04:25), Max: 36.9 (16 Oct 2023 17:30)  T(F): 98.3 (17 Oct 2023 04:25), Max: 98.4 (16 Oct 2023 17:30)  HR: 89 (17 Oct 2023 04:25) (89 - 98)  BP: 132/90 (17 Oct 2023 04:25) (125/76 - 137/71)  BP(mean): --  RR: 18 (17 Oct 2023 04:25) (18 - 18)  SpO2: 93% (17 Oct 2023 04:25) (92% - 95%)    Parameters below as of 17 Oct 2023 04:25  Patient On (Oxygen Delivery Method): room air    Daily     Daily     Appearance: Alert, responsive, in no acute distress.  LLE: left hip Mepilex c/d/i, compartments soft and compressible, visible skin intact without erythema or ecchymosis, +PF/DF/EHL/FHL, SILT, DP intact, BCR, no cyanosis    A/P:  Pt is a  89y Female with left hip CRPP POD#2    PLAN:   * WBAT  * PT/OT  * Pain Control  * DVTP  * cont. care per primary team

## 2023-10-17 NOTE — PROGRESS NOTE ADULT - SUBJECTIVE AND OBJECTIVE BOX
OFELIA VILLANUEVA    [id number]    89y      Female    CC: s/p fall     INTERVAL HPI/OVERNIGHT EVENTS: pt seen and examined. reports hip pain controlled     REVIEW OF SYSTEMS:    CONSTITUTIONAL: No fever, weight loss  RESPIRATORY: No cough, wheezing, hemoptysis; No shortness of breath  CARDIOVASCULAR: No chest pain, palpitations  GASTROINTESTINAL: No abdominal or epigastric pain. No nausea, vomiting    Vital Signs Last 24 Hrs  T(C): 36.6 (17 Oct 2023 09:34), Max: 36.9 (16 Oct 2023 17:30)  T(F): 97.8 (17 Oct 2023 09:34), Max: 98.4 (16 Oct 2023 17:30)  HR: 80 (17 Oct 2023 11:49) (71 - 98)  BP: 123/80 (17 Oct 2023 11:49) (93/59 - 137/71)  BP(mean): --  RR: 18 (17 Oct 2023 11:49) (18 - 18)  SpO2: 95% (17 Oct 2023 11:49) (92% - 95%)    Parameters below as of 17 Oct 2023 09:34  Patient On (Oxygen Delivery Method): room air        PHYSICAL EXAM:    GENERAL: NAD  HEENT: PERRL, +EOMI  CHEST/LUNG: Clear to auscultation bilaterally  HEART: S1S2+, irregular   ABDOMEN: Soft, Nontender, Nondistended; Bowel sounds present  SKIN: warm, dry  MSK: LLE dressing c/d/i  NEURO: awake, alert     LABS:                        9.9    6.17  )-----------( 100      ( 17 Oct 2023 05:32 )             28.6     10-17    123<L>  |  90<L>  |  13.4  ----------------------------<  106<H>  4.3   |  20.0<L>  |  0.85    Ca    8.0<L>      17 Oct 2023 11:19  Phos  2.0     10-17  Mg     1.8     10-17        Urinalysis Basic - ( 17 Oct 2023 11:19 )    Color: x / Appearance: x / SG: x / pH: x  Gluc: 106 mg/dL / Ketone: x  / Bili: x / Urobili: x   Blood: x / Protein: x / Nitrite: x   Leuk Esterase: x / RBC: x / WBC x   Sq Epi: x / Non Sq Epi: x / Bacteria: x          MEDICATIONS  (STANDING):  acetaminophen     Tablet .. 975 milliGRAM(s) Oral every 8 hours  apixaban 2.5 milliGRAM(s) Oral two times a day  gabapentin 100 milliGRAM(s) Oral three times a day  influenza  Vaccine (HIGH DOSE) 0.7 milliLiter(s) IntraMuscular once  levothyroxine 50 MICROGram(s) Oral daily  lidocaine   4% Patch 1 Patch Transdermal daily  metoprolol tartrate 50 milliGRAM(s) Oral two times a day  mupirocin 2% Ointment 1 Application(s) Both Nostrils two times a day  povidone iodine 5% Nasal Swab 1 Application(s) Both Nostrils once  senna 2 Tablet(s) Oral at bedtime  sodium chloride 1 Gram(s) Oral three times a day  sodium chloride 0.45% 1000 milliLiter(s) (75 mL/Hr) IV Continuous <Continuous>    MEDICATIONS  (PRN):  aluminum hydroxide/magnesium hydroxide/simethicone Suspension 30 milliLiter(s) Oral every 4 hours PRN Dyspepsia  bisacodyl 5 milliGRAM(s) Oral every 12 hours PRN Constipation  cyclobenzaprine 5 milliGRAM(s) Oral two times a day PRN Muscle Spasm  melatonin 3 milliGRAM(s) Oral at bedtime PRN Insomnia  ondansetron Injectable 4 milliGRAM(s) IV Push every 8 hours PRN Nausea and/or Vomiting  traMADol 25 milliGRAM(s) Oral every 6 hours PRN Moderate Pain (4 - 6)  traMADol 50 milliGRAM(s) Oral every 6 hours PRN Severe Pain (7 - 10)      RADIOLOGY & ADDITIONAL TESTS:

## 2023-10-17 NOTE — PROGRESS NOTE ADULT - ASSESSMENT
89y/oF PMH asthma, dementia, HFpEF, afib on Eliquis, HTN, hypothyroid, nephrolithiasis, hyponatremia presenting s/p fall; admitted w/L hip fx     L femoral neck fx   s/p fall   -CT pelvis: acute minimally displaced/varus impacted transcervical L femoral neck fx; at least mod L > R hip arthrosis  -CTH without ICH or mass effect   -ortho following   -s/p L hip perc pinning today 10/15  -pain control   -PT eval rec home PT   -WBAT   -fall precautions   -cont eliquis     anemia   -f/u am cbc     Hyponatremia, chronic   metabolic acidosis   -cont to monitor   -cont salt tabs   -f/u am bmp     chronic afib   -cont eliquis   -cont metoprolol     HFpEF   HTN   -cont metoprolol    Hypothyroid   -cont synthroid     vte ppx: eliquis     Pt unable to ambulate with cane or rolling walker and is unable to self propel a standard wheelchair. pt requires a transport wheelchair for ADLs in the home due to L hip fx and subsequent decreased ROM, strength. Pt has caregiver willing to assist     dispo: dc home w/home PT/assist, likely next 24-48hrs   89y/oF PMH asthma, dementia, HFpEF, afib on Eliquis, HTN, hypothyroid, nephrolithiasis, hyponatremia presenting s/p fall; admitted w/L hip fx     L femoral neck fx   s/p fall   -CT pelvis: acute minimally displaced/varus impacted transcervical L femoral neck fx; at least mod L > R hip arthrosis  -CTH without ICH or mass effect   -ortho following   -s/p L hip perc pinning today 10/15  -pain control   -PT eval rec home PT   -WBAT   -fall precautions   -cont eliquis     anemia   -f/u am cbc     Hyponatremia, chronic   metabolic acidosis   -cont to monitor   -cont salt tabs   -f/u am bmp     chronic afib   -cont eliquis   -cont metoprolol     HFpEF   HTN   -cont metoprolol    Hypothyroid   -cont synthroid     vte ppx: eliquis     Pt unable to ambulate with cane or rolling walker and is unable to self propel a standard wheelchair. pt requires a transport wheelchair for ADLs in the home due to L hip fx and subsequent decreased ROM, strength. Pt has caregiver willing to assist.    Pt requires a commode due to L hip fx and subsequent decreased ROM, strength. Pt is confined to one room of the home without a bathroom    dispo: dc home w/home PT/assist, likely next 24-48hrs   89y/oF PMH asthma, dementia, HFpEF, afib on Eliquis, HTN, hypothyroid, nephrolithiasis, hyponatremia presenting s/p fall; admitted w/L hip fx     L femoral neck fx   s/p fall   -CT pelvis: acute minimally displaced/varus impacted transcervical L femoral neck fx; at least mod L > R hip arthrosis  -CTH without ICH or mass effect   -ortho following   -s/p L hip perc pinning 10/15  -pain control   -PT eval rec home PT   -WBAT   -fall precautions   -cont eliquis     anemia   -f/u am cbc     Hyponatremia, chronic   metabolic acidosis   -cont to monitor   -cont salt tabs   -f/u am bmp     chronic afib   -cont eliquis   -cont metoprolol     HFpEF   HTN   -cont metoprolol    Hypothyroid   -cont synthroid     vte ppx: eliquis     Pt unable to ambulate with cane or rolling walker and is unable to self propel a standard wheelchair. pt requires a transport wheelchair for ADLs in the home due to L hip fx and subsequent decreased ROM, strength. Pt has caregiver willing to assist.    Pt requires a commode due to L hip fx and subsequent decreased ROM, strength. Pt is confined to one room of the home without a bathroom    dispo: dc home w/home PT/assist, likely next 24-48hrs

## 2023-10-18 ENCOUNTER — TRANSCRIPTION ENCOUNTER (OUTPATIENT)
Age: 88
End: 2023-10-18

## 2023-10-18 LAB
ANION GAP SERPL CALC-SCNC: 12 MMOL/L — SIGNIFICANT CHANGE UP (ref 5–17)
ANION GAP SERPL CALC-SCNC: 12 MMOL/L — SIGNIFICANT CHANGE UP (ref 5–17)
BUN SERPL-MCNC: 10.3 MG/DL — SIGNIFICANT CHANGE UP (ref 8–20)
BUN SERPL-MCNC: 10.3 MG/DL — SIGNIFICANT CHANGE UP (ref 8–20)
CALCIUM SERPL-MCNC: 7.9 MG/DL — LOW (ref 8.4–10.5)
CALCIUM SERPL-MCNC: 7.9 MG/DL — LOW (ref 8.4–10.5)
CHLORIDE SERPL-SCNC: 91 MMOL/L — LOW (ref 96–108)
CHLORIDE SERPL-SCNC: 91 MMOL/L — LOW (ref 96–108)
CO2 SERPL-SCNC: 22 MMOL/L — SIGNIFICANT CHANGE UP (ref 22–29)
CO2 SERPL-SCNC: 22 MMOL/L — SIGNIFICANT CHANGE UP (ref 22–29)
CREAT SERPL-MCNC: 0.71 MG/DL — SIGNIFICANT CHANGE UP (ref 0.5–1.3)
CREAT SERPL-MCNC: 0.71 MG/DL — SIGNIFICANT CHANGE UP (ref 0.5–1.3)
EGFR: 79 ML/MIN/1.73M2 — SIGNIFICANT CHANGE UP
EGFR: 79 ML/MIN/1.73M2 — SIGNIFICANT CHANGE UP
GLUCOSE SERPL-MCNC: 102 MG/DL — HIGH (ref 70–99)
GLUCOSE SERPL-MCNC: 102 MG/DL — HIGH (ref 70–99)
HCT VFR BLD CALC: 26.9 % — LOW (ref 34.5–45)
HCT VFR BLD CALC: 26.9 % — LOW (ref 34.5–45)
HGB BLD-MCNC: 9.4 G/DL — LOW (ref 11.5–15.5)
HGB BLD-MCNC: 9.4 G/DL — LOW (ref 11.5–15.5)
MAGNESIUM SERPL-MCNC: 1.8 MG/DL — SIGNIFICANT CHANGE UP (ref 1.6–2.6)
MAGNESIUM SERPL-MCNC: 1.8 MG/DL — SIGNIFICANT CHANGE UP (ref 1.6–2.6)
MCHC RBC-ENTMCNC: 33.6 PG — SIGNIFICANT CHANGE UP (ref 27–34)
MCHC RBC-ENTMCNC: 33.6 PG — SIGNIFICANT CHANGE UP (ref 27–34)
MCHC RBC-ENTMCNC: 34.9 GM/DL — SIGNIFICANT CHANGE UP (ref 32–36)
MCHC RBC-ENTMCNC: 34.9 GM/DL — SIGNIFICANT CHANGE UP (ref 32–36)
MCV RBC AUTO: 96.1 FL — SIGNIFICANT CHANGE UP (ref 80–100)
MCV RBC AUTO: 96.1 FL — SIGNIFICANT CHANGE UP (ref 80–100)
PHOSPHATE SERPL-MCNC: 2.4 MG/DL — SIGNIFICANT CHANGE UP (ref 2.4–4.7)
PHOSPHATE SERPL-MCNC: 2.4 MG/DL — SIGNIFICANT CHANGE UP (ref 2.4–4.7)
PLATELET # BLD AUTO: 111 K/UL — LOW (ref 150–400)
PLATELET # BLD AUTO: 111 K/UL — LOW (ref 150–400)
POTASSIUM SERPL-MCNC: 3.8 MMOL/L — SIGNIFICANT CHANGE UP (ref 3.5–5.3)
POTASSIUM SERPL-MCNC: 3.8 MMOL/L — SIGNIFICANT CHANGE UP (ref 3.5–5.3)
POTASSIUM SERPL-SCNC: 3.8 MMOL/L — SIGNIFICANT CHANGE UP (ref 3.5–5.3)
POTASSIUM SERPL-SCNC: 3.8 MMOL/L — SIGNIFICANT CHANGE UP (ref 3.5–5.3)
RBC # BLD: 2.8 M/UL — LOW (ref 3.8–5.2)
RBC # BLD: 2.8 M/UL — LOW (ref 3.8–5.2)
RBC # FLD: 13 % — SIGNIFICANT CHANGE UP (ref 10.3–14.5)
RBC # FLD: 13 % — SIGNIFICANT CHANGE UP (ref 10.3–14.5)
SODIUM SERPL-SCNC: 125 MMOL/L — LOW (ref 135–145)
SODIUM SERPL-SCNC: 125 MMOL/L — LOW (ref 135–145)
WBC # BLD: 5.08 K/UL — SIGNIFICANT CHANGE UP (ref 3.8–10.5)
WBC # BLD: 5.08 K/UL — SIGNIFICANT CHANGE UP (ref 3.8–10.5)
WBC # FLD AUTO: 5.08 K/UL — SIGNIFICANT CHANGE UP (ref 3.8–10.5)
WBC # FLD AUTO: 5.08 K/UL — SIGNIFICANT CHANGE UP (ref 3.8–10.5)

## 2023-10-18 PROCEDURE — 99239 HOSP IP/OBS DSCHRG MGMT >30: CPT

## 2023-10-18 RX ORDER — TRAMADOL HYDROCHLORIDE 50 MG/1
1 TABLET ORAL
Qty: 12 | Refills: 0
Start: 2023-10-18 | End: 2023-10-20

## 2023-10-18 RX ORDER — MAGNESIUM SULFATE 500 MG/ML
1 VIAL (ML) INJECTION ONCE
Refills: 0 | Status: COMPLETED | OUTPATIENT
Start: 2023-10-18 | End: 2023-10-18

## 2023-10-18 RX ORDER — SENNA PLUS 8.6 MG/1
2 TABLET ORAL
Qty: 0 | Refills: 0 | DISCHARGE
Start: 2023-10-18

## 2023-10-18 RX ADMIN — Medication 50 MILLIGRAM(S): at 18:08

## 2023-10-18 RX ADMIN — Medication 975 MILLIGRAM(S): at 06:22

## 2023-10-18 RX ADMIN — Medication 975 MILLIGRAM(S): at 21:56

## 2023-10-18 RX ADMIN — Medication 100 GRAM(S): at 09:55

## 2023-10-18 RX ADMIN — APIXABAN 2.5 MILLIGRAM(S): 2.5 TABLET, FILM COATED ORAL at 18:08

## 2023-10-18 RX ADMIN — Medication 50 MICROGRAM(S): at 06:27

## 2023-10-18 RX ADMIN — Medication 975 MILLIGRAM(S): at 14:31

## 2023-10-18 RX ADMIN — SODIUM CHLORIDE 1 GRAM(S): 9 INJECTION INTRAMUSCULAR; INTRAVENOUS; SUBCUTANEOUS at 21:56

## 2023-10-18 RX ADMIN — Medication 975 MILLIGRAM(S): at 22:56

## 2023-10-18 RX ADMIN — SENNA PLUS 2 TABLET(S): 8.6 TABLET ORAL at 21:56

## 2023-10-18 RX ADMIN — TRAMADOL HYDROCHLORIDE 25 MILLIGRAM(S): 50 TABLET ORAL at 06:22

## 2023-10-18 RX ADMIN — Medication 50 MILLIGRAM(S): at 06:23

## 2023-10-18 RX ADMIN — SODIUM CHLORIDE 75 MILLILITER(S): 9 INJECTION, SOLUTION INTRAVENOUS at 02:10

## 2023-10-18 RX ADMIN — SODIUM CHLORIDE 1 GRAM(S): 9 INJECTION INTRAMUSCULAR; INTRAVENOUS; SUBCUTANEOUS at 13:35

## 2023-10-18 RX ADMIN — MUPIROCIN 1 APPLICATION(S): 20 OINTMENT TOPICAL at 18:10

## 2023-10-18 RX ADMIN — Medication 975 MILLIGRAM(S): at 13:35

## 2023-10-18 RX ADMIN — GABAPENTIN 100 MILLIGRAM(S): 400 CAPSULE ORAL at 13:35

## 2023-10-18 RX ADMIN — MUPIROCIN 1 APPLICATION(S): 20 OINTMENT TOPICAL at 06:33

## 2023-10-18 RX ADMIN — GABAPENTIN 100 MILLIGRAM(S): 400 CAPSULE ORAL at 06:22

## 2023-10-18 RX ADMIN — SODIUM CHLORIDE 1 GRAM(S): 9 INJECTION INTRAMUSCULAR; INTRAVENOUS; SUBCUTANEOUS at 06:27

## 2023-10-18 RX ADMIN — APIXABAN 2.5 MILLIGRAM(S): 2.5 TABLET, FILM COATED ORAL at 06:22

## 2023-10-18 RX ADMIN — GABAPENTIN 100 MILLIGRAM(S): 400 CAPSULE ORAL at 21:56

## 2023-10-18 NOTE — PROGRESS NOTE ADULT - SUBJECTIVE AND OBJECTIVE BOX
OFELIA     [id number]    History: Patient is status post left hip percutaneous pinning on 10/15, POD#3.  Patient is doing well. The patient's pain is controlled using the prescribed pain medications. The patient is participating in physical therapy, WBAT.   Denies nausea, vomiting, chest pain, shortness of breath, abdominal pain or fever. No new orthopedic complaints.                         9.7    7.42  )-----------( 120      ( 17 Oct 2023 14:03 )             28.0       10-17    123<L>  |  90<L>  |  13.4  ----------------------------<  106<H>  4.3   |  20.0<L>  |  0.85    Ca    8.0<L>      17 Oct 2023 11:19  Phos  2.0     10-17  Mg     1.8     10-17          MEDICATIONS  (STANDING):  acetaminophen     Tablet .. 975 milliGRAM(s) Oral every 8 hours  apixaban 2.5 milliGRAM(s) Oral two times a day  gabapentin 100 milliGRAM(s) Oral three times a day  influenza  Vaccine (HIGH DOSE) 0.7 milliLiter(s) IntraMuscular once  levothyroxine 50 MICROGram(s) Oral daily  lidocaine   4% Patch 1 Patch Transdermal daily  metoprolol tartrate 50 milliGRAM(s) Oral two times a day  mupirocin 2% Ointment 1 Application(s) Both Nostrils two times a day  povidone iodine 5% Nasal Swab 1 Application(s) Both Nostrils once  senna 2 Tablet(s) Oral at bedtime  sodium chloride 1 Gram(s) Oral three times a day  sodium chloride 0.45% 1000 milliLiter(s) (75 mL/Hr) IV Continuous <Continuous>    MEDICATIONS  (PRN):  aluminum hydroxide/magnesium hydroxide/simethicone Suspension 30 milliLiter(s) Oral every 4 hours PRN Dyspepsia  bisacodyl 5 milliGRAM(s) Oral every 12 hours PRN Constipation  cyclobenzaprine 5 milliGRAM(s) Oral two times a day PRN Muscle Spasm  melatonin 3 milliGRAM(s) Oral at bedtime PRN Insomnia  ondansetron Injectable 4 milliGRAM(s) IV Push every 8 hours PRN Nausea and/or Vomiting  traMADol 25 milliGRAM(s) Oral every 6 hours PRN Moderate Pain (4 - 6)  traMADol 50 milliGRAM(s) Oral every 6 hours PRN Severe Pain (7 - 10)      Physical exam: The left hip dressing remains clean, dry and intact. No drainage or discharge. No erythema is noted. No blistering. No ecchymosis. The calf is supple nontender. Passive range of motion is acceptable to due postoperative pain. No calf tenderness. Sensation to light touch is grossly intact distally. Motor function distally is 5/5. No foot drop. 2+ dorsalis pedis pulse. Capillary refill is less than 2 seconds. No cyanosis.    Primary Orthopedic Assessment:  • s/p LEFT  hip percutaneous pinning.   Secondary  Orthopedic Assessment(s):   •     Secondary  Medical Assessment(s):   •     Plan:   • DVT prophylaxis as prescribed, including use of compression devices and ankle pumps  • Continue physical therapy  • Weightbearing as tolerated of the Left lower extremity with assistance of a walker  • Incentive spirometry encouraged  • Pain control as clinically indicated  •• Discharge planning – anticipated discharge is subacute rehabilitation   OFELIA     [id number]    History: Patient is status post left hip percutaneous pinning on 10/15, POD#3.  Patient is doing well. The patient's pain is controlled using the prescribed pain medications. The patient is participating in physical therapy, WBAT.   Denies nausea, vomiting, chest pain, shortness of breath, abdominal pain or fever. No new orthopedic complaints.                         9.7    7.42  )-----------( 120      ( 17 Oct 2023 14:03 )             28.0       10-17    123<L>  |  90<L>  |  13.4  ----------------------------<  106<H>  4.3   |  20.0<L>  |  0.85    Ca    8.0<L>      17 Oct 2023 11:19  Phos  2.0     10-17  Mg     1.8     10-17          MEDICATIONS  (STANDING):  acetaminophen     Tablet .. 975 milliGRAM(s) Oral every 8 hours  apixaban 2.5 milliGRAM(s) Oral two times a day  gabapentin 100 milliGRAM(s) Oral three times a day  influenza  Vaccine (HIGH DOSE) 0.7 milliLiter(s) IntraMuscular once  levothyroxine 50 MICROGram(s) Oral daily  lidocaine   4% Patch 1 Patch Transdermal daily  metoprolol tartrate 50 milliGRAM(s) Oral two times a day  mupirocin 2% Ointment 1 Application(s) Both Nostrils two times a day  povidone iodine 5% Nasal Swab 1 Application(s) Both Nostrils once  senna 2 Tablet(s) Oral at bedtime  sodium chloride 1 Gram(s) Oral three times a day  sodium chloride 0.45% 1000 milliLiter(s) (75 mL/Hr) IV Continuous <Continuous>    MEDICATIONS  (PRN):  aluminum hydroxide/magnesium hydroxide/simethicone Suspension 30 milliLiter(s) Oral every 4 hours PRN Dyspepsia  bisacodyl 5 milliGRAM(s) Oral every 12 hours PRN Constipation  cyclobenzaprine 5 milliGRAM(s) Oral two times a day PRN Muscle Spasm  melatonin 3 milliGRAM(s) Oral at bedtime PRN Insomnia  ondansetron Injectable 4 milliGRAM(s) IV Push every 8 hours PRN Nausea and/or Vomiting  traMADol 25 milliGRAM(s) Oral every 6 hours PRN Moderate Pain (4 - 6)  traMADol 50 milliGRAM(s) Oral every 6 hours PRN Severe Pain (7 - 10)      Physical exam: The left hip dressing remains clean, dry and intact. No drainage or discharge. No erythema is noted. No blistering. No ecchymosis. The calf is supple nontender. Passive range of motion is acceptable to due postoperative pain. No calf tenderness. Sensation to light touch is grossly intact distally. Motor function distally is 5/5. No foot drop. 2+ dorsalis pedis pulse. Capillary refill is less than 2 seconds. No cyanosis.    Primary Orthopedic Assessment:  • s/p LEFT  hip percutaneous pinning.   Secondary  Orthopedic Assessment(s):   •     Secondary  Medical Assessment(s):   •     Plan:   • DVT prophylaxis as prescribed, including use of compression devices and ankle pumps  • Continue physical therapy  • Weightbearing as tolerated of the Left lower extremity with assistance of a walker  • Incentive spirometry encouraged  • Pain control as clinically indicated  •• Discharge planning –

## 2023-10-18 NOTE — DISCHARGE NOTE NURSING/CASE MANAGEMENT/SOCIAL WORK - PATIENT PORTAL LINK FT
You can access the FollowMyHealth Patient Portal offered by [facility] by registering at the following website: [email or web address]. By joining [facility]’s FollowMyHealth portal, you will also be able to view your health information using other applications (apps) compatible with our system.

## 2023-10-18 NOTE — PROGRESS NOTE ADULT - SUBJECTIVE AND OBJECTIVE BOX
OFELIA VILLANUEVA    [id number]    89y      Female    CC: s/p fall    INTERVAL HPI/OVERNIGHT EVENTS: pt seen and examined. no acute events reported o/n     REVIEW OF SYSTEMS:    CONSTITUTIONAL: No fever, weight loss  RESPIRATORY: No cough, wheezing, hemoptysis; No shortness of breath  CARDIOVASCULAR: No chest pain, palpitations  GASTROINTESTINAL: No abdominal or epigastric pain. No nausea, vomiting    Vital Signs Last 24 Hrs  T(C): 36.7 (18 Oct 2023 13:54), Max: 36.7 (18 Oct 2023 00:50)  T(F): 98.1 (18 Oct 2023 13:54), Max: 98.1 (18 Oct 2023 00:50)  HR: 78 (18 Oct 2023 13:54) (72 - 86)  BP: 120/65 (18 Oct 2023 13:54) (119/73 - 142/89)  BP(mean): --  RR: 18 (18 Oct 2023 13:54) (18 - 18)  SpO2: 96% (18 Oct 2023 13:54) (95% - 98%)    Parameters below as of 18 Oct 2023 13:54  Patient On (Oxygen Delivery Method): room air        PHYSICAL EXAM:    GENERAL: NAD  HEENT: PERRL, +EOMI  CHEST/LUNG: Clear to auscultation bilaterally  HEART: S1S2+, irregular   ABDOMEN: Soft, Nontender, Nondistended; Bowel sounds present  SKIN: warm, dry  MSK: LLE dressing c/d/i  NEURO: awake, alert     LABS:                        9.4    5.08  )-----------( 111      ( 18 Oct 2023 06:58 )             26.9     10-18    125<L>  |  91<L>  |  10.3  ----------------------------<  102<H>  3.8   |  22.0  |  0.71    Ca    7.9<L>      18 Oct 2023 06:58  Phos  2.4     10-18  Mg     1.8     10-18        Urinalysis Basic - ( 18 Oct 2023 06:58 )    Color: x / Appearance: x / SG: x / pH: x  Gluc: 102 mg/dL / Ketone: x  / Bili: x / Urobili: x   Blood: x / Protein: x / Nitrite: x   Leuk Esterase: x / RBC: x / WBC x   Sq Epi: x / Non Sq Epi: x / Bacteria: x          MEDICATIONS  (STANDING):  acetaminophen     Tablet .. 975 milliGRAM(s) Oral every 8 hours  apixaban 2.5 milliGRAM(s) Oral two times a day  gabapentin 100 milliGRAM(s) Oral three times a day  influenza  Vaccine (HIGH DOSE) 0.7 milliLiter(s) IntraMuscular once  levothyroxine 50 MICROGram(s) Oral daily  lidocaine   4% Patch 1 Patch Transdermal daily  metoprolol tartrate 50 milliGRAM(s) Oral two times a day  mupirocin 2% Ointment 1 Application(s) Both Nostrils two times a day  povidone iodine 5% Nasal Swab 1 Application(s) Both Nostrils once  senna 2 Tablet(s) Oral at bedtime  sodium chloride 1 Gram(s) Oral three times a day  sodium chloride 0.45% 1000 milliLiter(s) (75 mL/Hr) IV Continuous <Continuous>    MEDICATIONS  (PRN):  aluminum hydroxide/magnesium hydroxide/simethicone Suspension 30 milliLiter(s) Oral every 4 hours PRN Dyspepsia  bisacodyl 5 milliGRAM(s) Oral every 12 hours PRN Constipation  cyclobenzaprine 5 milliGRAM(s) Oral two times a day PRN Muscle Spasm  melatonin 3 milliGRAM(s) Oral at bedtime PRN Insomnia  ondansetron Injectable 4 milliGRAM(s) IV Push every 8 hours PRN Nausea and/or Vomiting  traMADol 25 milliGRAM(s) Oral every 6 hours PRN Moderate Pain (4 - 6)  traMADol 50 milliGRAM(s) Oral every 6 hours PRN Severe Pain (7 - 10)      RADIOLOGY & ADDITIONAL TESTS:

## 2023-10-18 NOTE — PROGRESS NOTE ADULT - ASSESSMENT
89y/oF PMH asthma, dementia, HFpEF, afib on Eliquis, HTN, hypothyroid, nephrolithiasis, hyponatremia presenting s/p fall; admitted w/L hip fx     L femoral neck fx   s/p fall   -CT pelvis: acute minimally displaced/varus impacted transcervical L femoral neck fx; at least mod L > R hip arthrosis  -CTH without ICH or mass effect   -ortho following   -s/p L hip perc pinning 10/15  -pain control   -PT eval rec home PT   -WBAT   -fall precautions   -cont eliquis     anemia   -stable    Hyponatremia, chronic   metabolic acidosis   -cont to monitor   -cont salt tabs     chronic afib   -cont eliquis   -cont metoprolol     HFpEF   HTN   -cont metoprolol    Hypothyroid   -cont synthroid     vte ppx: eliquis     dispo: home w/home PT/assist next 24hrs

## 2023-10-19 LAB
ANION GAP SERPL CALC-SCNC: 11 MMOL/L — SIGNIFICANT CHANGE UP (ref 5–17)
ANION GAP SERPL CALC-SCNC: 11 MMOL/L — SIGNIFICANT CHANGE UP (ref 5–17)
ANION GAP SERPL CALC-SCNC: 14 MMOL/L — SIGNIFICANT CHANGE UP (ref 5–17)
ANION GAP SERPL CALC-SCNC: 14 MMOL/L — SIGNIFICANT CHANGE UP (ref 5–17)
BUN SERPL-MCNC: 10.8 MG/DL — SIGNIFICANT CHANGE UP (ref 8–20)
BUN SERPL-MCNC: 10.8 MG/DL — SIGNIFICANT CHANGE UP (ref 8–20)
BUN SERPL-MCNC: 8.7 MG/DL — SIGNIFICANT CHANGE UP (ref 8–20)
BUN SERPL-MCNC: 8.7 MG/DL — SIGNIFICANT CHANGE UP (ref 8–20)
CALCIUM SERPL-MCNC: 8 MG/DL — LOW (ref 8.4–10.5)
CALCIUM SERPL-MCNC: 8 MG/DL — LOW (ref 8.4–10.5)
CALCIUM SERPL-MCNC: 8.2 MG/DL — LOW (ref 8.4–10.5)
CALCIUM SERPL-MCNC: 8.2 MG/DL — LOW (ref 8.4–10.5)
CHLORIDE SERPL-SCNC: 88 MMOL/L — LOW (ref 96–108)
CHLORIDE SERPL-SCNC: 88 MMOL/L — LOW (ref 96–108)
CHLORIDE SERPL-SCNC: 90 MMOL/L — LOW (ref 96–108)
CHLORIDE SERPL-SCNC: 90 MMOL/L — LOW (ref 96–108)
CO2 SERPL-SCNC: 21 MMOL/L — LOW (ref 22–29)
CO2 SERPL-SCNC: 21 MMOL/L — LOW (ref 22–29)
CO2 SERPL-SCNC: 23 MMOL/L — SIGNIFICANT CHANGE UP (ref 22–29)
CO2 SERPL-SCNC: 23 MMOL/L — SIGNIFICANT CHANGE UP (ref 22–29)
CREAT SERPL-MCNC: 0.69 MG/DL — SIGNIFICANT CHANGE UP (ref 0.5–1.3)
CREAT SERPL-MCNC: 0.69 MG/DL — SIGNIFICANT CHANGE UP (ref 0.5–1.3)
CREAT SERPL-MCNC: 0.78 MG/DL — SIGNIFICANT CHANGE UP (ref 0.5–1.3)
CREAT SERPL-MCNC: 0.78 MG/DL — SIGNIFICANT CHANGE UP (ref 0.5–1.3)
EGFR: 71 ML/MIN/1.73M2 — SIGNIFICANT CHANGE UP
EGFR: 71 ML/MIN/1.73M2 — SIGNIFICANT CHANGE UP
EGFR: 81 ML/MIN/1.73M2 — SIGNIFICANT CHANGE UP
EGFR: 81 ML/MIN/1.73M2 — SIGNIFICANT CHANGE UP
GLUCOSE SERPL-MCNC: 114 MG/DL — HIGH (ref 70–99)
GLUCOSE SERPL-MCNC: 114 MG/DL — HIGH (ref 70–99)
GLUCOSE SERPL-MCNC: 98 MG/DL — SIGNIFICANT CHANGE UP (ref 70–99)
GLUCOSE SERPL-MCNC: 98 MG/DL — SIGNIFICANT CHANGE UP (ref 70–99)
POTASSIUM SERPL-MCNC: 3.9 MMOL/L — SIGNIFICANT CHANGE UP (ref 3.5–5.3)
POTASSIUM SERPL-SCNC: 3.9 MMOL/L — SIGNIFICANT CHANGE UP (ref 3.5–5.3)
SODIUM SERPL-SCNC: 122 MMOL/L — LOW (ref 135–145)
SODIUM SERPL-SCNC: 122 MMOL/L — LOW (ref 135–145)
SODIUM SERPL-SCNC: 125 MMOL/L — LOW (ref 135–145)
SODIUM SERPL-SCNC: 125 MMOL/L — LOW (ref 135–145)

## 2023-10-19 PROCEDURE — 99232 SBSQ HOSP IP/OBS MODERATE 35: CPT

## 2023-10-19 PROCEDURE — 99222 1ST HOSP IP/OBS MODERATE 55: CPT

## 2023-10-19 RX ORDER — SODIUM CHLORIDE 5 G/100ML
1000 INJECTION, SOLUTION INTRAVENOUS
Refills: 0 | Status: DISCONTINUED | OUTPATIENT
Start: 2023-10-19 | End: 2023-10-20

## 2023-10-19 RX ORDER — SODIUM CHLORIDE 9 MG/ML
2 INJECTION INTRAMUSCULAR; INTRAVENOUS; SUBCUTANEOUS THREE TIMES A DAY
Refills: 0 | Status: DISCONTINUED | OUTPATIENT
Start: 2023-10-19 | End: 2023-10-20

## 2023-10-19 RX ADMIN — Medication 975 MILLIGRAM(S): at 14:11

## 2023-10-19 RX ADMIN — TRAMADOL HYDROCHLORIDE 50 MILLIGRAM(S): 50 TABLET ORAL at 01:06

## 2023-10-19 RX ADMIN — GABAPENTIN 100 MILLIGRAM(S): 400 CAPSULE ORAL at 13:11

## 2023-10-19 RX ADMIN — SENNA PLUS 2 TABLET(S): 8.6 TABLET ORAL at 22:26

## 2023-10-19 RX ADMIN — GABAPENTIN 100 MILLIGRAM(S): 400 CAPSULE ORAL at 22:26

## 2023-10-19 RX ADMIN — Medication 975 MILLIGRAM(S): at 05:49

## 2023-10-19 RX ADMIN — LIDOCAINE 1 PATCH: 4 CREAM TOPICAL at 13:12

## 2023-10-19 RX ADMIN — Medication 975 MILLIGRAM(S): at 23:26

## 2023-10-19 RX ADMIN — Medication 50 MILLIGRAM(S): at 17:14

## 2023-10-19 RX ADMIN — SODIUM CHLORIDE 50 MILLILITER(S): 5 INJECTION, SOLUTION INTRAVENOUS at 13:11

## 2023-10-19 RX ADMIN — Medication 50 MILLIGRAM(S): at 05:49

## 2023-10-19 RX ADMIN — Medication 975 MILLIGRAM(S): at 13:11

## 2023-10-19 RX ADMIN — Medication 975 MILLIGRAM(S): at 06:49

## 2023-10-19 RX ADMIN — SODIUM CHLORIDE 2 GRAM(S): 9 INJECTION INTRAMUSCULAR; INTRAVENOUS; SUBCUTANEOUS at 22:26

## 2023-10-19 RX ADMIN — MUPIROCIN 1 APPLICATION(S): 20 OINTMENT TOPICAL at 07:57

## 2023-10-19 RX ADMIN — Medication 50 MICROGRAM(S): at 07:03

## 2023-10-19 RX ADMIN — SODIUM CHLORIDE 2 GRAM(S): 9 INJECTION INTRAMUSCULAR; INTRAVENOUS; SUBCUTANEOUS at 13:11

## 2023-10-19 RX ADMIN — MUPIROCIN 1 APPLICATION(S): 20 OINTMENT TOPICAL at 18:00

## 2023-10-19 RX ADMIN — LIDOCAINE 1 PATCH: 4 CREAM TOPICAL at 20:10

## 2023-10-19 RX ADMIN — APIXABAN 2.5 MILLIGRAM(S): 2.5 TABLET, FILM COATED ORAL at 17:14

## 2023-10-19 RX ADMIN — SODIUM CHLORIDE 1 GRAM(S): 9 INJECTION INTRAMUSCULAR; INTRAVENOUS; SUBCUTANEOUS at 05:50

## 2023-10-19 RX ADMIN — GABAPENTIN 100 MILLIGRAM(S): 400 CAPSULE ORAL at 05:49

## 2023-10-19 RX ADMIN — APIXABAN 2.5 MILLIGRAM(S): 2.5 TABLET, FILM COATED ORAL at 05:49

## 2023-10-19 RX ADMIN — Medication 975 MILLIGRAM(S): at 22:26

## 2023-10-19 RX ADMIN — TRAMADOL HYDROCHLORIDE 50 MILLIGRAM(S): 50 TABLET ORAL at 02:06

## 2023-10-19 RX ADMIN — ONDANSETRON 4 MILLIGRAM(S): 8 TABLET, FILM COATED ORAL at 13:59

## 2023-10-19 NOTE — CONSULT NOTE ADULT - SUBJECTIVE AND OBJECTIVE BOX
[facility] DIVISION OF KIDNEY DISEASES AND HYPERTENSION -- INITIAL CONSULT NOTE  --------------------------------------------------------------------------------  HPI:  This is a 89 year old female w/ PMHx of asthma, dementia, CHF, atrial fibrillation on Xarelto, HTN, HLD, hypothyroidism, nephrolithiasis, and hyponatremia presents for evaluation after a fall while making her bed unwitnessed.  Pt was found to have    found to have LEFT femoral neck hip Fracture. pt is sp left hip percutaneous pinning on 10/15.  nephrology consulted for hyponatremia.            PAST HISTORY  --------------------------------------------------------------------------------  PAST MEDICAL & SURGICAL HISTORY:  Hypertension      Pacemaker      GERD (gastroesophageal reflux disease)      Afib      HLD (hyperlipidemia)      Asthma      H/O CHF      Hypothyroidism      Nephrolithiasis      Hyponatremia      H/O: hysterectomy      H/O laminectomy  lumbar        FAMILY HISTORY:  Family history of ovarian cancer (Mother)      PAST SOCIAL HISTORY:    ALLERGIES & MEDICATIONS  --------------------------------------------------------------------------------  Allergies    Robaxin (Other)  shellfish (Rash)  Demerol HCl (Nausea)  codeine (Unknown)  Bactrim (Other)    Intolerances      Standing Inpatient Medications  acetaminophen     Tablet .. 975 milliGRAM(s) Oral every 8 hours  apixaban 2.5 milliGRAM(s) Oral two times a day  gabapentin 100 milliGRAM(s) Oral three times a day  influenza  Vaccine (HIGH DOSE) 0.7 milliLiter(s) IntraMuscular once  levothyroxine 50 MICROGram(s) Oral daily  lidocaine   4% Patch 1 Patch Transdermal daily  metoprolol tartrate 50 milliGRAM(s) Oral two times a day  mupirocin 2% Ointment 1 Application(s) Both Nostrils two times a day  povidone iodine 5% Nasal Swab 1 Application(s) Both Nostrils once  senna 2 Tablet(s) Oral at bedtime  sodium chloride 2 Gram(s) Oral three times a day    PRN Inpatient Medications  aluminum hydroxide/magnesium hydroxide/simethicone Suspension 30 milliLiter(s) Oral every 4 hours PRN  bisacodyl 5 milliGRAM(s) Oral every 12 hours PRN  cyclobenzaprine 5 milliGRAM(s) Oral two times a day PRN  melatonin 3 milliGRAM(s) Oral at bedtime PRN  ondansetron Injectable 4 milliGRAM(s) IV Push every 8 hours PRN  traMADol 50 milliGRAM(s) Oral every 6 hours PRN  traMADol 25 milliGRAM(s) Oral every 6 hours PRN      REVIEW OF SYSTEMS  --------------------------------------------------------------------------------  Gen: No weight changes, fatigue, fevers/chills, weakness  Skin: No rashes  Head/Eyes/Ears/Mouth: No headache; Normal hearing; Normal vision w/o blurriness; No sinus pain/discomfort, sore throat  Respiratory: No dyspnea, cough, wheezing, hemoptysis  CV: No chest pain, PND, orthopnea  GI: No abdominal pain, diarrhea, constipation, nausea, vomiting, melena, hematochezia  : No increased frequency, dysuria, hematuria, nocturia  MSK: No joint pain/swelling; no back pain; no edema  Neuro: No dizziness/lightheadedness, weakness, seizures, numbness, tingling  Heme: No easy bruising or bleeding  Endo: No heat/cold intolerance  Psych: No significant nervousness, anxiety, stress, depression    All other systems were reviewed and are negative, except as noted.    VITALS/PHYSICAL EXAM  --------------------------------------------------------------------------------  T(C): 36.4 (10-19-23 @ 09:24), Max: 36.7 (10-18-23 @ 13:54)  HR: 63 (10-19-23 @ 09:24) (63 - 86)  BP: 153/83 (10-19-23 @ 09:24) (105/72 - 153/83)  RR: 18 (10-19-23 @ 09:24) (16 - 18)  SpO2: 98% (10-19-23 @ 09:24) (94% - 98%)  Wt(kg): --        Physical Exam:  	Gen: NAD, well-appearing  	HEENT: PERRL, supple neck, clear oropharynx  	Pulm: CTA B/L  	CV: RRR, S1S2; no rub  	Back: No spinal or CVA tenderness; no sacral edema  	Abd: +BS, soft, nontender/nondistended  	: No suprapubic tenderness  	UE: Warm, FROM, no clubbing, intact strength; no edema; no asterixis  	LE: Warm, FROM, no clubbing, intact strength; no edema  	Neuro: No focal deficits, intact gait  	Psych: Normal affect and mood  	Skin: Warm, without rashes  	Vascular access:    LABS/STUDIES  --------------------------------------------------------------------------------              9.4    5.08  >-----------<  111      [10-18-23 @ 06:58]              26.9     122  |  88  |  8.7  ----------------------------<  98      [10-19-23 @ 09:03]  3.9   |  23.0  |  0.69        Ca     8.0     [10-19-23 @ 09:03]      Mg     1.8     [10-18-23 @ 06:58]      Phos  2.4     [10-18-23 @ 06:58]            Creatinine Trend:  SCr 0.69 [10-19 @ 09:03]  SCr 0.71 [10-18 @ 06:58]  SCr 0.85 [10-17 @ 11:19]  SCr 0.80 [10-17 @ 05:32]  SCr 0.79 [10-16 @ 05:26]    Urinalysis - [10-19-23 @ 09:03]      Color  / Appearance  / SG  / pH       Gluc 98 / Ketone   / Bili  / Urobili        Blood  / Protein  / Leuk Est  / Nitrite       RBC  / WBC  / Hyaline  / Gran  / Sq Epi  / Non Sq Epi  / Bacteria       Iron 50, TIBC 329, %sat 15      [10-15-23 @ 02:25]  Ferritin 370      [10-15-23 @ 02:25]       [facility] DIVISION OF KIDNEY DISEASES AND HYPERTENSION -- INITIAL CONSULT NOTE  --------------------------------------------------------------------------------  HPI:  This is a 89 year old female w/ PMHx of asthma, dementia, CHF, atrial fibrillation on Xarelto, HTN, HLD, hypothyroidism, nephrolithiasis, and hyponatremia presents for evaluation after a fall while making her bed unwitnessed.  Pt was found to have    found to have LEFT femoral neck hip Fracture. pt is sp left hip percutaneous pinning on 10/15.  nephrology consulted for hyponatremia.   pt seen and examined;  and daughter Mae at bedside.  Pt is a poor historian- reports she feels well and states she should drink more water when asked about her fluid intake.  Pt has a h/o chronic hyponatremia- was previously seen by our team during hospitalization in 2021.  She has salt tabs 1 g tid listed as her home meds but  denies pt was taking the medication.  He states she was on Gabapentin, Levothyroxine, Eliquis, metoprolol and spironolactone.  Also reports she was getting labs with DR Martínez (PCP) and was told her sodium levels were 'slightly low'  Daughter reports pt was not eating as much for the past few days but ate a good breakfast this Am.              PAST HISTORY  --------------------------------------------------------------------------------  PAST MEDICAL & SURGICAL HISTORY:  Hypertension      Pacemaker      GERD (gastroesophageal reflux disease)      Afib      HLD (hyperlipidemia)      Asthma      H/O CHF      Hypothyroidism      Nephrolithiasis      Hyponatremia      H/O: hysterectomy      H/O laminectomy  lumbar        FAMILY HISTORY:  Family history of ovarian cancer (Mother)      PAST SOCIAL HISTORY:     ALLERGIES & MEDICATIONS  --------------------------------------------------------------------------------  Allergies    Robaxin (Other)  shellfish (Rash)  Demerol HCl (Nausea)  codeine (Unknown)  Bactrim (Other)    Intolerances      Standing Inpatient Medications  acetaminophen     Tablet .. 975 milliGRAM(s) Oral every 8 hours  apixaban 2.5 milliGRAM(s) Oral two times a day  gabapentin 100 milliGRAM(s) Oral three times a day  influenza  Vaccine (HIGH DOSE) 0.7 milliLiter(s) IntraMuscular once  levothyroxine 50 MICROGram(s) Oral daily  lidocaine   4% Patch 1 Patch Transdermal daily  metoprolol tartrate 50 milliGRAM(s) Oral two times a day  mupirocin 2% Ointment 1 Application(s) Both Nostrils two times a day  povidone iodine 5% Nasal Swab 1 Application(s) Both Nostrils once  senna 2 Tablet(s) Oral at bedtime  sodium chloride 2 Gram(s) Oral three times a day    PRN Inpatient Medications  aluminum hydroxide/magnesium hydroxide/simethicone Suspension 30 milliLiter(s) Oral every 4 hours PRN  bisacodyl 5 milliGRAM(s) Oral every 12 hours PRN  cyclobenzaprine 5 milliGRAM(s) Oral two times a day PRN  melatonin 3 milliGRAM(s) Oral at bedtime PRN  ondansetron Injectable 4 milliGRAM(s) IV Push every 8 hours PRN  traMADol 50 milliGRAM(s) Oral every 6 hours PRN  traMADol 25 milliGRAM(s) Oral every 6 hours PRN      REVIEW OF SYSTEMS  --------------------------------------------------------------------------------  limited  VITALS/PHYSICAL EXAM  --------------------------------------------------------------------------------  T(C): 36.4 (10-19-23 @ 09:24), Max: 36.7 (10-18-23 @ 13:54)  HR: 63 (10-19-23 @ 09:24) (63 - 86)  BP: 153/83 (10-19-23 @ 09:24) (105/72 - 153/83)  RR: 18 (10-19-23 @ 09:24) (16 - 18)  SpO2: 98% (10-19-23 @ 09:24) (94% - 98%)  Wt(kg): --        Physical Exam:  	Gen: NAD, sitting in chair  	HEENT:  supple neck,   	Pulm: CTA B/L  	CV: RRR, S1S2; no rub  	Abd: +BS, soft, nontender/nondistended  	: No suprapubic tenderness  	UE: Warm,no edema  	LE: Warm,  no edema  	Neuro: No focal deficit  	Psych: calm  	Skin: Warm  LABS/STUDIES  --------------------------------------------------------------------------------              9.4    5.08  >-----------<  111      [10-18-23 @ 06:58]              26.9     122  |  88  |  8.7  ----------------------------<  98      [10-19-23 @ 09:03]  3.9   |  23.0  |  0.69        Ca     8.0     [10-19-23 @ 09:03]      Mg     1.8     [10-18-23 @ 06:58]      Phos  2.4     [10-18-23 @ 06:58]            Creatinine Trend:  SCr 0.69 [10-19 @ 09:03]  SCr 0.71 [10-18 @ 06:58]  SCr 0.85 [10-17 @ 11:19]  SCr 0.80 [10-17 @ 05:32]  SCr 0.79 [10-16 @ 05:26]    Urinalysis - [10-19-23 @ 09:03]      Color  / Appearance  / SG  / pH       Gluc 98 / Ketone   / Bili  / Urobili        Blood  / Protein  / Leuk Est  / Nitrite       RBC  / WBC  / Hyaline  / Gran  / Sq Epi  / Non Sq Epi  / Bacteria       Iron 50, TIBC 329, %sat 15      [10-15-23 @ 02:25]  Ferritin 370      [10-15-23 @ 02:25]

## 2023-10-19 NOTE — PROGRESS NOTE ADULT - SUBJECTIVE AND OBJECTIVE BOX
Hospitalist Daily Progress Note    Chief Complaint:  Patient is a 89y old  Female who presents with a chief complaint of S/P fall (19 Oct 2023 10:49)      SUBJECTIVE / OVERNIGHT EVENTS:  Patient was seen and examined at bedside. Feels well. No complaints. Noted to be hyponatremic.   Patient denies chest pain, SOB, abd pain, N/V, fever, chills, dysuria or any other complaints. All remainder ROS negative.     MEDICATIONS  (STANDING):  acetaminophen     Tablet .. 975 milliGRAM(s) Oral every 8 hours  apixaban 2.5 milliGRAM(s) Oral two times a day  gabapentin 100 milliGRAM(s) Oral three times a day  influenza  Vaccine (HIGH DOSE) 0.7 milliLiter(s) IntraMuscular once  levothyroxine 50 MICROGram(s) Oral daily  lidocaine   4% Patch 1 Patch Transdermal daily  metoprolol tartrate 50 milliGRAM(s) Oral two times a day  mupirocin 2% Ointment 1 Application(s) Both Nostrils two times a day  povidone iodine 5% Nasal Swab 1 Application(s) Both Nostrils once  senna 2 Tablet(s) Oral at bedtime  sodium chloride 2 Gram(s) Oral three times a day  sodium chloride 2% . 1000 milliLiter(s) (50 mL/Hr) IV Continuous <Continuous>    MEDICATIONS  (PRN):  aluminum hydroxide/magnesium hydroxide/simethicone Suspension 30 milliLiter(s) Oral every 4 hours PRN Dyspepsia  bisacodyl 5 milliGRAM(s) Oral every 12 hours PRN Constipation  cyclobenzaprine 5 milliGRAM(s) Oral two times a day PRN Muscle Spasm  melatonin 3 milliGRAM(s) Oral at bedtime PRN Insomnia  ondansetron Injectable 4 milliGRAM(s) IV Push every 8 hours PRN Nausea and/or Vomiting  traMADol 25 milliGRAM(s) Oral every 6 hours PRN Moderate Pain (4 - 6)  traMADol 50 milliGRAM(s) Oral every 6 hours PRN Severe Pain (7 - 10)        I&O's Summary      PHYSICAL EXAM:  Vital Signs Last 24 Hrs  T(C): 36.4 (19 Oct 2023 09:24), Max: 36.7 (18 Oct 2023 13:54)  T(F): 97.5 (19 Oct 2023 09:24), Max: 98.1 (18 Oct 2023 13:54)  HR: 63 (19 Oct 2023 09:24) (63 - 86)  BP: 153/83 (19 Oct 2023 09:24) (105/72 - 153/83)  BP(mean): --  RR: 18 (19 Oct 2023 09:24) (16 - 18)  SpO2: 98% (19 Oct 2023 09:24) (94% - 98%)    Parameters below as of 19 Oct 2023 09:24  Patient On (Oxygen Delivery Method): room air          Constitutional: NAD, Resting  ENT: Supple, No JVD  Lungs: CTA B/L, Non-labored breathing  Cardio: RRR, S1/S2, No murmur  Abdomen: Soft, Nontender, Nondistended; Bowel sounds present  Extremities: No calf tenderness, No pitting edema, Lle hip dressing in place  Musculoskeletal:   No joint swelling  Psych: Calm, cooperative affect appropriate  Neuro: Awake and alert, oriented  Skin: No rashes; no palpable lesions    LABS:                        9.4    5.08  )-----------( 111      ( 18 Oct 2023 06:58 )             26.9     10-19    122<L>  |  88<L>  |  8.7  ----------------------------<  98  3.9   |  23.0  |  0.69    Ca    8.0<L>      19 Oct 2023 09:03  Phos  2.4     10-18  Mg     1.8     10-18            Urinalysis Basic - ( 19 Oct 2023 09:03 )    Color: x / Appearance: x / SG: x / pH: x  Gluc: 98 mg/dL / Ketone: x  / Bili: x / Urobili: x   Blood: x / Protein: x / Nitrite: x   Leuk Esterase: x / RBC: x / WBC x   Sq Epi: x / Non Sq Epi: x / Bacteria: x        CAPILLARY BLOOD GLUCOSE            RADIOLOGY REVIEWED

## 2023-10-19 NOTE — PROGRESS NOTE ADULT - ASSESSMENT
89y/oF PMH asthma, dementia, HFpEF, afib on Eliquis, HTN, hypothyroid, nephrolithiasis, hyponatremia presenting s/p fall; admitted w/L hip fx     L femoral neck fx   s/p fall   -CT pelvis: acute minimally displaced/varus impacted transcervical L femoral neck fx; at least mod L > R hip arthrosis  -CTH without ICH or mass effect   -ortho following   -s/p L hip perc pinning 10/15  -pain control   -PT eval rec home PT   -WBAT   -fall precautions   -cont eliquis     anemia   -stable    Hyponatremia, chronic   metabolic acidosis   -Worsened to 122  -Renal Consulted  -Increased Na tabs to 2mg TID  -Renal placed on 2%NS  -Repeat BMP today at 7PM     chronic afib   -cont eliquis   -cont metoprolol     HFpEF   HTN   -cont metoprolol    Hypothyroid   -cont synthroid     vte ppx: Eliquis     dispo: home w/home PT - pending improvement of Na   Discussed with family at bedside

## 2023-10-19 NOTE — PROGRESS NOTE ADULT - PROVIDER SPECIALTY LIST ADULT
Hospitalist
Orthopedics
Hospitalist

## 2023-10-19 NOTE — CONSULT NOTE ADULT - ASSESSMENT
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 urine osm, Urine sodium and Urine potassium  continue salt tabs    Will start 2% NS for 6 hours  Repeat BMP at 7 pm  Goal correction~ 4-6 meq until tomorrow noon

## 2023-10-20 VITALS
DIASTOLIC BLOOD PRESSURE: 68 MMHG | TEMPERATURE: 98 F | RESPIRATION RATE: 18 BRPM | HEART RATE: 82 BPM | OXYGEN SATURATION: 96 % | SYSTOLIC BLOOD PRESSURE: 138 MMHG

## 2023-10-20 LAB
ANION GAP SERPL CALC-SCNC: 9 MMOL/L — SIGNIFICANT CHANGE UP (ref 5–17)
ANION GAP SERPL CALC-SCNC: 9 MMOL/L — SIGNIFICANT CHANGE UP (ref 5–17)
APPEARANCE UR: CLEAR — SIGNIFICANT CHANGE UP
APPEARANCE UR: CLEAR — SIGNIFICANT CHANGE UP
BACTERIA # UR AUTO: ABNORMAL
BACTERIA # UR AUTO: ABNORMAL
BASOPHILS # BLD AUTO: 0.04 K/UL — SIGNIFICANT CHANGE UP (ref 0–0.2)
BASOPHILS # BLD AUTO: 0.04 K/UL — SIGNIFICANT CHANGE UP (ref 0–0.2)
BASOPHILS NFR BLD AUTO: 1.1 % — SIGNIFICANT CHANGE UP (ref 0–2)
BASOPHILS NFR BLD AUTO: 1.1 % — SIGNIFICANT CHANGE UP (ref 0–2)
BILIRUB UR-MCNC: NEGATIVE — SIGNIFICANT CHANGE UP
BILIRUB UR-MCNC: NEGATIVE — SIGNIFICANT CHANGE UP
BUN SERPL-MCNC: 8.7 MG/DL — SIGNIFICANT CHANGE UP (ref 8–20)
BUN SERPL-MCNC: 8.7 MG/DL — SIGNIFICANT CHANGE UP (ref 8–20)
CALCIUM SERPL-MCNC: 8.1 MG/DL — LOW (ref 8.4–10.5)
CALCIUM SERPL-MCNC: 8.1 MG/DL — LOW (ref 8.4–10.5)
CHLORIDE SERPL-SCNC: 93 MMOL/L — LOW (ref 96–108)
CHLORIDE SERPL-SCNC: 93 MMOL/L — LOW (ref 96–108)
CO2 SERPL-SCNC: 23 MMOL/L — SIGNIFICANT CHANGE UP (ref 22–29)
CO2 SERPL-SCNC: 23 MMOL/L — SIGNIFICANT CHANGE UP (ref 22–29)
COLOR SPEC: YELLOW — SIGNIFICANT CHANGE UP
COLOR SPEC: YELLOW — SIGNIFICANT CHANGE UP
CREAT SERPL-MCNC: 0.73 MG/DL — SIGNIFICANT CHANGE UP (ref 0.5–1.3)
CREAT SERPL-MCNC: 0.73 MG/DL — SIGNIFICANT CHANGE UP (ref 0.5–1.3)
DIFF PNL FLD: ABNORMAL
DIFF PNL FLD: ABNORMAL
EGFR: 77 ML/MIN/1.73M2 — SIGNIFICANT CHANGE UP
EGFR: 77 ML/MIN/1.73M2 — SIGNIFICANT CHANGE UP
EOSINOPHIL # BLD AUTO: 0.21 K/UL — SIGNIFICANT CHANGE UP (ref 0–0.5)
EOSINOPHIL # BLD AUTO: 0.21 K/UL — SIGNIFICANT CHANGE UP (ref 0–0.5)
EOSINOPHIL NFR BLD AUTO: 5.6 % — SIGNIFICANT CHANGE UP (ref 0–6)
EOSINOPHIL NFR BLD AUTO: 5.6 % — SIGNIFICANT CHANGE UP (ref 0–6)
EPI CELLS # UR: SIGNIFICANT CHANGE UP
EPI CELLS # UR: SIGNIFICANT CHANGE UP
GLUCOSE SERPL-MCNC: 95 MG/DL — SIGNIFICANT CHANGE UP (ref 70–99)
GLUCOSE SERPL-MCNC: 95 MG/DL — SIGNIFICANT CHANGE UP (ref 70–99)
GLUCOSE UR QL: NEGATIVE MG/DL — SIGNIFICANT CHANGE UP
GLUCOSE UR QL: NEGATIVE MG/DL — SIGNIFICANT CHANGE UP
HCT VFR BLD CALC: 26 % — LOW (ref 34.5–45)
HCT VFR BLD CALC: 26 % — LOW (ref 34.5–45)
HGB BLD-MCNC: 8.7 G/DL — LOW (ref 11.5–15.5)
HGB BLD-MCNC: 8.7 G/DL — LOW (ref 11.5–15.5)
IMM GRANULOCYTES NFR BLD AUTO: 0.3 % — SIGNIFICANT CHANGE UP (ref 0–0.9)
IMM GRANULOCYTES NFR BLD AUTO: 0.3 % — SIGNIFICANT CHANGE UP (ref 0–0.9)
KETONES UR-MCNC: NEGATIVE — SIGNIFICANT CHANGE UP
KETONES UR-MCNC: NEGATIVE — SIGNIFICANT CHANGE UP
LEUKOCYTE ESTERASE UR-ACNC: NEGATIVE — SIGNIFICANT CHANGE UP
LEUKOCYTE ESTERASE UR-ACNC: NEGATIVE — SIGNIFICANT CHANGE UP
LYMPHOCYTES # BLD AUTO: 1.13 K/UL — SIGNIFICANT CHANGE UP (ref 1–3.3)
LYMPHOCYTES # BLD AUTO: 1.13 K/UL — SIGNIFICANT CHANGE UP (ref 1–3.3)
LYMPHOCYTES # BLD AUTO: 30.4 % — SIGNIFICANT CHANGE UP (ref 13–44)
LYMPHOCYTES # BLD AUTO: 30.4 % — SIGNIFICANT CHANGE UP (ref 13–44)
MCHC RBC-ENTMCNC: 33.2 PG — SIGNIFICANT CHANGE UP (ref 27–34)
MCHC RBC-ENTMCNC: 33.2 PG — SIGNIFICANT CHANGE UP (ref 27–34)
MCHC RBC-ENTMCNC: 33.5 GM/DL — SIGNIFICANT CHANGE UP (ref 32–36)
MCHC RBC-ENTMCNC: 33.5 GM/DL — SIGNIFICANT CHANGE UP (ref 32–36)
MCV RBC AUTO: 99.2 FL — SIGNIFICANT CHANGE UP (ref 80–100)
MCV RBC AUTO: 99.2 FL — SIGNIFICANT CHANGE UP (ref 80–100)
MONOCYTES # BLD AUTO: 0.37 K/UL — SIGNIFICANT CHANGE UP (ref 0–0.9)
MONOCYTES # BLD AUTO: 0.37 K/UL — SIGNIFICANT CHANGE UP (ref 0–0.9)
MONOCYTES NFR BLD AUTO: 9.9 % — SIGNIFICANT CHANGE UP (ref 2–14)
MONOCYTES NFR BLD AUTO: 9.9 % — SIGNIFICANT CHANGE UP (ref 2–14)
NEUTROPHILS # BLD AUTO: 1.96 K/UL — SIGNIFICANT CHANGE UP (ref 1.8–7.4)
NEUTROPHILS # BLD AUTO: 1.96 K/UL — SIGNIFICANT CHANGE UP (ref 1.8–7.4)
NEUTROPHILS NFR BLD AUTO: 52.7 % — SIGNIFICANT CHANGE UP (ref 43–77)
NEUTROPHILS NFR BLD AUTO: 52.7 % — SIGNIFICANT CHANGE UP (ref 43–77)
NITRITE UR-MCNC: NEGATIVE — SIGNIFICANT CHANGE UP
NITRITE UR-MCNC: NEGATIVE — SIGNIFICANT CHANGE UP
OSMOLALITY UR: 276 MOSM/KG — LOW (ref 300–1000)
OSMOLALITY UR: 276 MOSM/KG — LOW (ref 300–1000)
PH UR: 6.5 — SIGNIFICANT CHANGE UP (ref 5–8)
PH UR: 6.5 — SIGNIFICANT CHANGE UP (ref 5–8)
PLATELET # BLD AUTO: 140 K/UL — LOW (ref 150–400)
PLATELET # BLD AUTO: 140 K/UL — LOW (ref 150–400)
POTASSIUM SERPL-MCNC: 4 MMOL/L — SIGNIFICANT CHANGE UP (ref 3.5–5.3)
POTASSIUM SERPL-MCNC: 4 MMOL/L — SIGNIFICANT CHANGE UP (ref 3.5–5.3)
POTASSIUM SERPL-SCNC: 4 MMOL/L — SIGNIFICANT CHANGE UP (ref 3.5–5.3)
POTASSIUM SERPL-SCNC: 4 MMOL/L — SIGNIFICANT CHANGE UP (ref 3.5–5.3)
POTASSIUM UR-SCNC: 14 MMOL/L — SIGNIFICANT CHANGE UP
POTASSIUM UR-SCNC: 14 MMOL/L — SIGNIFICANT CHANGE UP
PROT UR-MCNC: 15
PROT UR-MCNC: 15
RBC # BLD: 2.62 M/UL — LOW (ref 3.8–5.2)
RBC # BLD: 2.62 M/UL — LOW (ref 3.8–5.2)
RBC # FLD: 13.3 % — SIGNIFICANT CHANGE UP (ref 10.3–14.5)
RBC # FLD: 13.3 % — SIGNIFICANT CHANGE UP (ref 10.3–14.5)
RBC CASTS # UR COMP ASSIST: SIGNIFICANT CHANGE UP /HPF (ref 0–4)
RBC CASTS # UR COMP ASSIST: SIGNIFICANT CHANGE UP /HPF (ref 0–4)
SODIUM SERPL-SCNC: 125 MMOL/L — LOW (ref 135–145)
SODIUM SERPL-SCNC: 125 MMOL/L — LOW (ref 135–145)
SODIUM UR-SCNC: 43 MMOL/L — SIGNIFICANT CHANGE UP
SODIUM UR-SCNC: 43 MMOL/L — SIGNIFICANT CHANGE UP
SP GR SPEC: 1.01 — SIGNIFICANT CHANGE UP (ref 1.01–1.02)
SP GR SPEC: 1.01 — SIGNIFICANT CHANGE UP (ref 1.01–1.02)
UROBILINOGEN FLD QL: NEGATIVE MG/DL — SIGNIFICANT CHANGE UP
UROBILINOGEN FLD QL: NEGATIVE MG/DL — SIGNIFICANT CHANGE UP
WBC # BLD: 3.72 K/UL — LOW (ref 3.8–10.5)
WBC # BLD: 3.72 K/UL — LOW (ref 3.8–10.5)
WBC # FLD AUTO: 3.72 K/UL — LOW (ref 3.8–10.5)
WBC # FLD AUTO: 3.72 K/UL — LOW (ref 3.8–10.5)
WBC UR QL: SIGNIFICANT CHANGE UP /HPF (ref 0–5)
WBC UR QL: SIGNIFICANT CHANGE UP /HPF (ref 0–5)

## 2023-10-20 PROCEDURE — 99239 HOSP IP/OBS DSCHRG MGMT >30: CPT

## 2023-10-20 RX ORDER — SODIUM CHLORIDE 9 MG/ML
1 INJECTION INTRAMUSCULAR; INTRAVENOUS; SUBCUTANEOUS
Qty: 90 | Refills: 0
Start: 2023-10-20 | End: 2023-11-18

## 2023-10-20 RX ADMIN — Medication 975 MILLIGRAM(S): at 05:20

## 2023-10-20 RX ADMIN — SODIUM CHLORIDE 2 GRAM(S): 9 INJECTION INTRAMUSCULAR; INTRAVENOUS; SUBCUTANEOUS at 13:13

## 2023-10-20 RX ADMIN — Medication 975 MILLIGRAM(S): at 14:54

## 2023-10-20 RX ADMIN — GABAPENTIN 100 MILLIGRAM(S): 400 CAPSULE ORAL at 05:19

## 2023-10-20 RX ADMIN — Medication 50 MILLIGRAM(S): at 17:19

## 2023-10-20 RX ADMIN — TRAMADOL HYDROCHLORIDE 25 MILLIGRAM(S): 50 TABLET ORAL at 06:56

## 2023-10-20 RX ADMIN — SODIUM CHLORIDE 2 GRAM(S): 9 INJECTION INTRAMUSCULAR; INTRAVENOUS; SUBCUTANEOUS at 05:20

## 2023-10-20 RX ADMIN — INFLUENZA VIRUS VACCINE 0.7 MILLILITER(S): 15; 15; 15; 15 SUSPENSION INTRAMUSCULAR at 13:25

## 2023-10-20 RX ADMIN — Medication 50 MILLIGRAM(S): at 05:19

## 2023-10-20 RX ADMIN — Medication 975 MILLIGRAM(S): at 13:13

## 2023-10-20 RX ADMIN — APIXABAN 2.5 MILLIGRAM(S): 2.5 TABLET, FILM COATED ORAL at 05:19

## 2023-10-20 RX ADMIN — TRAMADOL HYDROCHLORIDE 25 MILLIGRAM(S): 50 TABLET ORAL at 08:00

## 2023-10-20 RX ADMIN — APIXABAN 2.5 MILLIGRAM(S): 2.5 TABLET, FILM COATED ORAL at 17:19

## 2023-10-20 RX ADMIN — LIDOCAINE 1 PATCH: 4 CREAM TOPICAL at 03:11

## 2023-10-20 RX ADMIN — Medication 975 MILLIGRAM(S): at 06:20

## 2023-10-20 RX ADMIN — Medication 50 MICROGRAM(S): at 05:21

## 2023-10-22 ENCOUNTER — NON-APPOINTMENT (OUTPATIENT)
Age: 88
End: 2023-10-22

## 2023-10-23 PROBLEM — J45.909 UNSPECIFIED ASTHMA, UNCOMPLICATED: Chronic | Status: ACTIVE | Noted: 2023-10-14

## 2023-10-23 PROBLEM — E78.5 HYPERLIPIDEMIA, UNSPECIFIED: Chronic | Status: ACTIVE | Noted: 2023-10-14

## 2023-10-23 PROBLEM — N20.0 CALCULUS OF KIDNEY: Chronic | Status: ACTIVE | Noted: 2023-10-14

## 2023-10-23 PROBLEM — E03.9 HYPOTHYROIDISM, UNSPECIFIED: Chronic | Status: ACTIVE | Noted: 2023-10-14

## 2023-10-23 PROBLEM — E87.1 HYPO-OSMOLALITY AND HYPONATREMIA: Chronic | Status: ACTIVE | Noted: 2023-10-14

## 2023-10-23 PROBLEM — Z86.79 PERSONAL HISTORY OF OTHER DISEASES OF THE CIRCULATORY SYSTEM: Chronic | Status: ACTIVE | Noted: 2023-10-14

## 2023-11-01 ENCOUNTER — APPOINTMENT (OUTPATIENT)
Dept: ORTHOPEDIC SURGERY | Facility: CLINIC | Age: 88
End: 2023-11-01
Payer: MEDICARE

## 2023-11-01 PROCEDURE — 73502 X-RAY EXAM HIP UNI 2-3 VIEWS: CPT

## 2023-11-01 PROCEDURE — 99024 POSTOP FOLLOW-UP VISIT: CPT

## 2023-11-13 PROCEDURE — 83735 ASSAY OF MAGNESIUM: CPT

## 2023-11-13 PROCEDURE — 84466 ASSAY OF TRANSFERRIN: CPT

## 2023-11-13 PROCEDURE — 86901 BLOOD TYPING SEROLOGIC RH(D): CPT

## 2023-11-13 PROCEDURE — 97167 OT EVAL HIGH COMPLEX 60 MIN: CPT

## 2023-11-13 PROCEDURE — G1004: CPT

## 2023-11-13 PROCEDURE — 97163 PT EVAL HIGH COMPLEX 45 MIN: CPT

## 2023-11-13 PROCEDURE — 87641 MR-STAPH DNA AMP PROBE: CPT

## 2023-11-13 PROCEDURE — 84484 ASSAY OF TROPONIN QUANT: CPT

## 2023-11-13 PROCEDURE — C9399: CPT

## 2023-11-13 PROCEDURE — 87640 STAPH A DNA AMP PROBE: CPT

## 2023-11-13 PROCEDURE — 80048 BASIC METABOLIC PNL TOTAL CA: CPT

## 2023-11-13 PROCEDURE — 70450 CT HEAD/BRAIN W/O DYE: CPT

## 2023-11-13 PROCEDURE — 80053 COMPREHEN METABOLIC PANEL: CPT

## 2023-11-13 PROCEDURE — C1769: CPT

## 2023-11-13 PROCEDURE — 90662 IIV NO PRSV INCREASED AG IM: CPT

## 2023-11-13 PROCEDURE — 36415 COLL VENOUS BLD VENIPUNCTURE: CPT

## 2023-11-13 PROCEDURE — 96374 THER/PROPH/DIAG INJ IV PUSH: CPT

## 2023-11-13 PROCEDURE — 85730 THROMBOPLASTIN TIME PARTIAL: CPT

## 2023-11-13 PROCEDURE — 85027 COMPLETE CBC AUTOMATED: CPT

## 2023-11-13 PROCEDURE — 83935 ASSAY OF URINE OSMOLALITY: CPT

## 2023-11-13 PROCEDURE — 73552 X-RAY EXAM OF FEMUR 2/>: CPT

## 2023-11-13 PROCEDURE — 82728 ASSAY OF FERRITIN: CPT

## 2023-11-13 PROCEDURE — 81001 URINALYSIS AUTO W/SCOPE: CPT

## 2023-11-13 PROCEDURE — 84300 ASSAY OF URINE SODIUM: CPT

## 2023-11-13 PROCEDURE — 71045 X-RAY EXAM CHEST 1 VIEW: CPT

## 2023-11-13 PROCEDURE — 99285 EMERGENCY DEPT VISIT HI MDM: CPT | Mod: 25

## 2023-11-13 PROCEDURE — 83540 ASSAY OF IRON: CPT

## 2023-11-13 PROCEDURE — 84100 ASSAY OF PHOSPHORUS: CPT

## 2023-11-13 PROCEDURE — 76000 FLUOROSCOPY <1 HR PHYS/QHP: CPT

## 2023-11-13 PROCEDURE — 93005 ELECTROCARDIOGRAM TRACING: CPT

## 2023-11-13 PROCEDURE — 86850 RBC ANTIBODY SCREEN: CPT

## 2023-11-13 PROCEDURE — 85610 PROTHROMBIN TIME: CPT

## 2023-11-13 PROCEDURE — 84133 ASSAY OF URINE POTASSIUM: CPT

## 2023-11-13 PROCEDURE — 83550 IRON BINDING TEST: CPT

## 2023-11-13 PROCEDURE — 73502 X-RAY EXAM HIP UNI 2-3 VIEWS: CPT

## 2023-11-13 PROCEDURE — 82607 VITAMIN B-12: CPT

## 2023-11-13 PROCEDURE — C1713: CPT

## 2023-11-13 PROCEDURE — 85025 COMPLETE CBC W/AUTO DIFF WBC: CPT

## 2023-11-13 PROCEDURE — 72192 CT PELVIS W/O DYE: CPT | Mod: MG

## 2023-11-13 PROCEDURE — 86900 BLOOD TYPING SEROLOGIC ABO: CPT

## 2023-11-13 PROCEDURE — 82746 ASSAY OF FOLIC ACID SERUM: CPT

## 2023-11-24 ENCOUNTER — NON-APPOINTMENT (OUTPATIENT)
Age: 88
End: 2023-11-24

## 2024-01-10 ENCOUNTER — APPOINTMENT (OUTPATIENT)
Dept: ORTHOPEDIC SURGERY | Facility: CLINIC | Age: 89
End: 2024-01-10
Payer: MEDICARE

## 2024-01-10 DIAGNOSIS — Z87.81 PERSONAL HISTORY OF (HEALED) TRAUMATIC FRACTURE: ICD-10-CM

## 2024-01-10 PROCEDURE — 99024 POSTOP FOLLOW-UP VISIT: CPT

## 2024-01-10 PROCEDURE — 73502 X-RAY EXAM HIP UNI 2-3 VIEWS: CPT

## 2024-01-10 NOTE — ADDENDUM
[FreeTextEntry1] : This note was written by Yu Geurrero, acting as the  for Dr. Sprague. This note accurately reflects the work and decisions made by Dr. Sprague.

## 2024-01-10 NOTE — HISTORY OF PRESENT ILLNESS
[Procedure: ___] : status post [unfilled] [___ Weeks Post Op] : [unfilled] weeks post op [Healed] : healed [Xray (Date:___)] : [unfilled] Xray -  [Hardware in Good Position] : hardware in good position [No Obvious Fractures] : no obvious fractures [Good Overall Alignment] : good overall alignment [Doing Well] : is doing well [Excellent Pain Control] : has excellent pain control [No Sign of Infection] : is showing no signs of infection [Chills] : no chills [Constipation] : no constipation [Diarrhea] : no diarrhea [Dysuria] : no dysuria [Fever] : no fever [Nausea] : no nausea [Vomiting] : no vomiting [Erythema] : not erythematous [Discharge] : absent of discharge [Swelling] : not swollen [Dehiscence] : not dehisced [de-identified] : Status post left hip percutaneous pinning. (DOS: 10/15/2023) [FreeTextEntry1] : left hip percutaneous pinning for a left valgus impacted femoral neck fracture [de-identified] : 89 year old female presents today 12.5 weeks status post left hip percutaneous pinning. The patient underwent surgery on 10/15/2023. Overall, the patient is happy with the results of her operation. She has completed physical therapy and has been performing at home exercises.. The patient ambulates with the use of a walker when outside of the house. She has been taking Tylenol.  Denies any recent injuries, falls or trauma, incisional issues, swelling, calf tenderness, chest pain/SOB.   Of note the patient reports dizziness with head position changes that was present prior to surgery and is following up with her phyisician on Friday.  [de-identified] : Left Lower Extremity: Incision is well-healed without erythema drainage or discharge, hip full flexion with 50 degree of external rotation and 20 degree of internal rotation without pain, leg lengths equal, 5 out of 5 strength for plantarflexion dorsiflexion, sensation intact to light touch over the foot, foot warm well perfused brisk capillary refill. [de-identified] : AP pelvis and 2 views of the left hip obtained in the office today show no acute fracture or dislocation. There is a left hip pinning with hardware in appropriate aligned without evidence of new fracture or dislocation or hardware compromise. [de-identified] : 89 year old female presents today 12.5 weeks status post left hip percutaneous pinning. The patient underwent surgery on 10/15/2023. Overall, I am happy with the patient's progress. The patient may continue to weightbear as tolerated and continue her at home exercises. She will continue taking Tylenol as needed for the pain. The patient will follow up with her PCP in regards to her reported dizziness. She will follow-up in 1 year for repeat evaluation and imaging. All questions were asked and answered. The patient, her , and her daughter verbalized understanding the plan.

## 2024-03-18 ENCOUNTER — OFFICE (OUTPATIENT)
Dept: URBAN - METROPOLITAN AREA CLINIC 115 | Facility: CLINIC | Age: 89
Setting detail: OPHTHALMOLOGY
End: 2024-03-18
Payer: MEDICARE

## 2024-03-18 DIAGNOSIS — H26.493: ICD-10-CM

## 2024-03-18 DIAGNOSIS — H01.014: ICD-10-CM

## 2024-03-18 DIAGNOSIS — H02.132: ICD-10-CM

## 2024-03-18 DIAGNOSIS — H04.122: ICD-10-CM

## 2024-03-18 DIAGNOSIS — H01.011: ICD-10-CM

## 2024-03-18 DIAGNOSIS — H04.121: ICD-10-CM

## 2024-03-18 PROCEDURE — 92014 COMPRE OPH EXAM EST PT 1/>: CPT | Performed by: OPHTHALMOLOGY

## 2024-03-18 PROCEDURE — 83861 MICROFLUID ANALY TEARS: CPT | Mod: QW,RT | Performed by: OPHTHALMOLOGY

## 2024-03-18 PROCEDURE — 83861 MICROFLUID ANALY TEARS: CPT | Mod: QW,LT | Performed by: OPHTHALMOLOGY

## 2024-03-18 ASSESSMENT — REFRACTION_CURRENTRX
OS_AXIS: 087
OD_AXIS: 096
OD_OVR_VA: 20/
OS_SPHERE: +4.00
OD_CYLINDER: -0.25
OS_OVR_VA: 20/
OS_CYLINDER: -0.75
OD_VPRISM_DIRECTION: SV
OD_SPHERE: +4.25

## 2024-03-18 ASSESSMENT — LID EXAM ASSESSMENTS
OD_BLEPHARITIS: RLL RUL 1+
OS_BLEPHARITIS: LLL LUL 1+

## 2024-03-18 ASSESSMENT — LID POSITION - ECTROPION
OS_ECTROPION: LLL T
OD_ECTROPION: RLL T

## 2024-09-23 ENCOUNTER — OFFICE (OUTPATIENT)
Dept: URBAN - METROPOLITAN AREA CLINIC 115 | Facility: CLINIC | Age: 89
Setting detail: OPHTHALMOLOGY
End: 2024-09-23
Payer: MEDICARE

## 2024-09-23 DIAGNOSIS — H26.493: ICD-10-CM

## 2024-09-23 DIAGNOSIS — H04.122: ICD-10-CM

## 2024-09-23 DIAGNOSIS — H35.032: ICD-10-CM

## 2024-09-23 DIAGNOSIS — H01.011: ICD-10-CM

## 2024-09-23 DIAGNOSIS — H04.121: ICD-10-CM

## 2024-09-23 DIAGNOSIS — H35.031: ICD-10-CM

## 2024-09-23 DIAGNOSIS — H02.135: ICD-10-CM

## 2024-09-23 DIAGNOSIS — Z96.1: ICD-10-CM

## 2024-09-23 DIAGNOSIS — H02.132: ICD-10-CM

## 2024-09-23 DIAGNOSIS — H01.014: ICD-10-CM

## 2024-09-23 PROCEDURE — 92012 INTRM OPH EXAM EST PATIENT: CPT | Performed by: OPHTHALMOLOGY

## 2024-09-23 ASSESSMENT — CONFRONTATIONAL VISUAL FIELD TEST (CVF)
OD_FINDINGS: FULL
OS_FINDINGS: FULL

## 2024-09-23 ASSESSMENT — LID POSITION - ECTROPION
OS_ECTROPION: LLL T
OD_ECTROPION: RLL T

## 2024-09-23 ASSESSMENT — LID EXAM ASSESSMENTS
OD_BLEPHARITIS: RLL RUL 1+
OS_BLEPHARITIS: LLL LUL 1+

## 2024-11-19 ENCOUNTER — APPOINTMENT (OUTPATIENT)
Dept: ORTHOPEDIC SURGERY | Facility: CLINIC | Age: 89
End: 2024-11-19
Payer: MEDICARE

## 2024-11-19 ENCOUNTER — TRANSCRIPTION ENCOUNTER (OUTPATIENT)
Age: 89
End: 2024-11-19

## 2024-11-19 DIAGNOSIS — Z87.81 PERSONAL HISTORY OF (HEALED) TRAUMATIC FRACTURE: ICD-10-CM

## 2024-11-19 DIAGNOSIS — M87.052 IDIOPATHIC ASEPTIC NECROSIS OF LEFT FEMUR: ICD-10-CM

## 2024-11-19 PROCEDURE — 99215 OFFICE O/P EST HI 40 MIN: CPT

## 2024-11-19 PROCEDURE — G2211 COMPLEX E/M VISIT ADD ON: CPT

## 2024-11-19 PROCEDURE — 73502 X-RAY EXAM HIP UNI 2-3 VIEWS: CPT

## 2024-12-13 ENCOUNTER — OUTPATIENT (OUTPATIENT)
Dept: OUTPATIENT SERVICES | Facility: HOSPITAL | Age: 88
LOS: 1 days | End: 2024-12-13
Payer: MEDICARE

## 2024-12-13 VITALS
HEIGHT: 62 IN | WEIGHT: 120.81 LBS | SYSTOLIC BLOOD PRESSURE: 130 MMHG | HEART RATE: 66 BPM | OXYGEN SATURATION: 98 % | TEMPERATURE: 98 F | DIASTOLIC BLOOD PRESSURE: 70 MMHG | RESPIRATION RATE: 18 BRPM

## 2024-12-13 DIAGNOSIS — Z98.890 OTHER SPECIFIED POSTPROCEDURAL STATES: Chronic | ICD-10-CM

## 2024-12-13 DIAGNOSIS — M87.052 IDIOPATHIC ASEPTIC NECROSIS OF LEFT FEMUR: ICD-10-CM

## 2024-12-13 DIAGNOSIS — Z01.818 ENCOUNTER FOR OTHER PREPROCEDURAL EXAMINATION: ICD-10-CM

## 2024-12-13 DIAGNOSIS — E03.9 HYPOTHYROIDISM, UNSPECIFIED: ICD-10-CM

## 2024-12-13 DIAGNOSIS — Z13.89 ENCOUNTER FOR SCREENING FOR OTHER DISORDER: ICD-10-CM

## 2024-12-13 DIAGNOSIS — I48.91 UNSPECIFIED ATRIAL FIBRILLATION: ICD-10-CM

## 2024-12-13 DIAGNOSIS — Z90.49 ACQUIRED ABSENCE OF OTHER SPECIFIED PARTS OF DIGESTIVE TRACT: Chronic | ICD-10-CM

## 2024-12-13 DIAGNOSIS — I10 ESSENTIAL (PRIMARY) HYPERTENSION: ICD-10-CM

## 2024-12-13 DIAGNOSIS — Z90.710 ACQUIRED ABSENCE OF BOTH CERVIX AND UTERUS: Chronic | ICD-10-CM

## 2024-12-13 DIAGNOSIS — Z98.89 OTHER SPECIFIED POSTPROCEDURAL STATES: Chronic | ICD-10-CM

## 2024-12-13 DIAGNOSIS — Z90.89 ACQUIRED ABSENCE OF OTHER ORGANS: Chronic | ICD-10-CM

## 2024-12-13 DIAGNOSIS — Z29.9 ENCOUNTER FOR PROPHYLACTIC MEASURES, UNSPECIFIED: ICD-10-CM

## 2024-12-13 LAB
A1C WITH ESTIMATED AVERAGE GLUCOSE RESULT: 5.2 % — SIGNIFICANT CHANGE UP (ref 4–5.6)
ALBUMIN SERPL ELPH-MCNC: 4 G/DL — SIGNIFICANT CHANGE UP (ref 3.3–5.2)
ALP SERPL-CCNC: 70 U/L — SIGNIFICANT CHANGE UP (ref 40–120)
ALT FLD-CCNC: 15 U/L — SIGNIFICANT CHANGE UP
ANION GAP SERPL CALC-SCNC: 12 MMOL/L — SIGNIFICANT CHANGE UP (ref 5–17)
APTT BLD: 38 SEC — HIGH (ref 24.5–35.6)
AST SERPL-CCNC: 32 U/L — HIGH
BASOPHILS # BLD AUTO: 0.04 K/UL — SIGNIFICANT CHANGE UP (ref 0–0.2)
BASOPHILS NFR BLD AUTO: 0.8 % — SIGNIFICANT CHANGE UP (ref 0–2)
BILIRUB SERPL-MCNC: 0.6 MG/DL — SIGNIFICANT CHANGE UP (ref 0.4–2)
BLD GP AB SCN SERPL QL: SIGNIFICANT CHANGE UP
BUN SERPL-MCNC: 10.9 MG/DL — SIGNIFICANT CHANGE UP (ref 8–20)
CALCIUM SERPL-MCNC: 9.5 MG/DL — SIGNIFICANT CHANGE UP (ref 8.4–10.5)
CHLORIDE SERPL-SCNC: 101 MMOL/L — SIGNIFICANT CHANGE UP (ref 96–108)
CO2 SERPL-SCNC: 22 MMOL/L — SIGNIFICANT CHANGE UP (ref 22–29)
CREAT SERPL-MCNC: 0.89 MG/DL — SIGNIFICANT CHANGE UP (ref 0.5–1.3)
EGFR: 60 ML/MIN/1.73M2 — SIGNIFICANT CHANGE UP
EOSINOPHIL # BLD AUTO: 0.14 K/UL — SIGNIFICANT CHANGE UP (ref 0–0.5)
EOSINOPHIL NFR BLD AUTO: 3 % — SIGNIFICANT CHANGE UP (ref 0–6)
ESTIMATED AVERAGE GLUCOSE: 103 MG/DL — SIGNIFICANT CHANGE UP (ref 68–114)
GLUCOSE SERPL-MCNC: 91 MG/DL — SIGNIFICANT CHANGE UP (ref 70–99)
HCT VFR BLD CALC: 30.5 % — LOW (ref 34.5–45)
HGB BLD-MCNC: 10.1 G/DL — LOW (ref 11.5–15.5)
IMM GRANULOCYTES NFR BLD AUTO: 0.4 % — SIGNIFICANT CHANGE UP (ref 0–0.9)
INR BLD: 1.51 RATIO — HIGH (ref 0.85–1.16)
LYMPHOCYTES # BLD AUTO: 1.33 K/UL — SIGNIFICANT CHANGE UP (ref 1–3.3)
LYMPHOCYTES # BLD AUTO: 28.2 % — SIGNIFICANT CHANGE UP (ref 13–44)
MCHC RBC-ENTMCNC: 33.1 G/DL — SIGNIFICANT CHANGE UP (ref 32–36)
MCHC RBC-ENTMCNC: 33.6 PG — SIGNIFICANT CHANGE UP (ref 27–34)
MCV RBC AUTO: 101.3 FL — HIGH (ref 80–100)
MONOCYTES # BLD AUTO: 0.44 K/UL — SIGNIFICANT CHANGE UP (ref 0–0.9)
MONOCYTES NFR BLD AUTO: 9.3 % — SIGNIFICANT CHANGE UP (ref 2–14)
MRSA PCR RESULT.: SIGNIFICANT CHANGE UP
NEUTROPHILS # BLD AUTO: 2.74 K/UL — SIGNIFICANT CHANGE UP (ref 1.8–7.4)
NEUTROPHILS NFR BLD AUTO: 58.3 % — SIGNIFICANT CHANGE UP (ref 43–77)
PLATELET # BLD AUTO: 144 K/UL — LOW (ref 150–400)
POTASSIUM SERPL-MCNC: 4.7 MMOL/L — SIGNIFICANT CHANGE UP (ref 3.5–5.3)
POTASSIUM SERPL-SCNC: 4.7 MMOL/L — SIGNIFICANT CHANGE UP (ref 3.5–5.3)
PROT SERPL-MCNC: 7.3 G/DL — SIGNIFICANT CHANGE UP (ref 6.6–8.7)
PROTHROM AB SERPL-ACNC: 17.4 SEC — HIGH (ref 9.9–13.4)
RBC # BLD: 3.01 M/UL — LOW (ref 3.8–5.2)
RBC # FLD: 13.1 % — SIGNIFICANT CHANGE UP (ref 10.3–14.5)
S AUREUS DNA NOSE QL NAA+PROBE: SIGNIFICANT CHANGE UP
SODIUM SERPL-SCNC: 135 MMOL/L — SIGNIFICANT CHANGE UP (ref 135–145)
T3 SERPL-MCNC: 94 NG/DL — SIGNIFICANT CHANGE UP (ref 80–200)
T4 AB SER-ACNC: 9.8 UG/DL — SIGNIFICANT CHANGE UP (ref 4.5–12)
TSH SERPL-MCNC: 0.53 UIU/ML — SIGNIFICANT CHANGE UP (ref 0.27–4.2)
WBC # BLD: 4.71 K/UL — SIGNIFICANT CHANGE UP (ref 3.8–10.5)
WBC # FLD AUTO: 4.71 K/UL — SIGNIFICANT CHANGE UP (ref 3.8–10.5)

## 2024-12-13 PROCEDURE — 84480 ASSAY TRIIODOTHYRONINE (T3): CPT

## 2024-12-13 PROCEDURE — 83036 HEMOGLOBIN GLYCOSYLATED A1C: CPT

## 2024-12-13 PROCEDURE — 84443 ASSAY THYROID STIM HORMONE: CPT

## 2024-12-13 PROCEDURE — 86901 BLOOD TYPING SEROLOGIC RH(D): CPT

## 2024-12-13 PROCEDURE — 85025 COMPLETE CBC W/AUTO DIFF WBC: CPT

## 2024-12-13 PROCEDURE — 93005 ELECTROCARDIOGRAM TRACING: CPT

## 2024-12-13 PROCEDURE — 93010 ELECTROCARDIOGRAM REPORT: CPT

## 2024-12-13 PROCEDURE — 36415 COLL VENOUS BLD VENIPUNCTURE: CPT

## 2024-12-13 PROCEDURE — 87641 MR-STAPH DNA AMP PROBE: CPT

## 2024-12-13 PROCEDURE — 86850 RBC ANTIBODY SCREEN: CPT

## 2024-12-13 PROCEDURE — 80053 COMPREHEN METABOLIC PANEL: CPT

## 2024-12-13 PROCEDURE — G0463: CPT

## 2024-12-13 PROCEDURE — 84436 ASSAY OF TOTAL THYROXINE: CPT

## 2024-12-13 PROCEDURE — 86900 BLOOD TYPING SEROLOGIC ABO: CPT

## 2024-12-13 PROCEDURE — 85730 THROMBOPLASTIN TIME PARTIAL: CPT

## 2024-12-13 PROCEDURE — 85610 PROTHROMBIN TIME: CPT

## 2024-12-13 PROCEDURE — 87640 STAPH A DNA AMP PROBE: CPT

## 2024-12-13 RX ORDER — SODIUM CHLORIDE 9 MG/ML
3 INJECTION, SOLUTION INTRAMUSCULAR; INTRAVENOUS; SUBCUTANEOUS EVERY 8 HOURS
Refills: 0 | Status: DISCONTINUED | OUTPATIENT
Start: 2025-01-02 | End: 2025-01-02

## 2024-12-13 NOTE — H&P PST ADULT - HISTORY OF PRESENT ILLNESS
89 yo with PMH HTn afip s/p pacemaker, anemia,  HTN, Hypothyroidism, anxiety and depression presents to PST today. Pt. reports pain to the left hip from a fall 2023- completed ORIF then as well.  Described pain as sharp with no radiation to LLE.  Attends chiropracter initially went 3x/week but now once every other week. Takes extra strength tylenol with minimal relief. Pt. reports pain is intermittent. Movement, walking have been exacerbating pain. Sitting down alleviate pain. Pain levels include a current pain level of 3/10,and a maximum pain level of 10/10. Pt. uses a cane and walker, wheelchair for ambulation.  89 yo with PMH HTN afib s/p pacemaker, anemia,  HTN, Hypothyroidism, anxiety and depression presents to PST today. Pt. reports pain to the left hip from a fall 2023- completed ORIF then as well.  Described pain as sharp with no radiation to LLE.  Attends chiropractor initially 3x/week but now goes once every other week. Takes extra strength tylenol with minimal relief. Pt. reports pain is intermittent. Movement, walking have been exacerbating pain. Sitting down alleviate pain. Pain levels include a current pain level of 3/10,and a maximum pain level of 10/10. Pt. uses a cane and walker, wheelchair for ambulation. Scheduled for left conversion total hip replacement VS hemiarthroplasty on 1/2/2024.

## 2024-12-13 NOTE — H&P PST ADULT - EKG AND INTERPRETATION
Atrial fibrillation with frequent ventricular paced complexes  anteroseptal infarct  Pending final int

## 2024-12-13 NOTE — H&P PST ADULT - NSHP PST DIAGOTHER LIST_GEN_A_CORE
Abnormal lab values sent to Dr. Sprauge, YAMILE Larkin and Judie Tong RN. Iron infusion request sent to Dr. Sprague.

## 2024-12-13 NOTE — H&P PST ADULT - PROBLEM SELECTOR PLAN 5
Retinal tear and detachment warning symptoms reviewed and patient instructed to call immediately if increasing floaters, flashes, or decreasing peripheral vision. Caprini - 8 High  risk   Surgical team please assess/recommend mechanical/pharmacological measures for VTE prophylaxis

## 2024-12-13 NOTE — H&P PST ADULT - ASSESSMENT
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       -Medical evaluation pending  -Cardiac evaluation pending  - Educated on ERP protocol   -Educated on NSAIDS, multivitamins and herbals that increase the risk of bleeding and need to be stopped 5 days before procedure  -Educated on infection prevention  -Tylenol can be taken if needed for pain  -Eliquis per cardiologist  -Will continue all other medications as prescribed  -Pt. daughter and  vrbalized understanding of all instructions.      OPIOID RISK TOOL    DIANA EACH BOX THAT APPLIES AND ADD TOTALS AT THE END    FAMILY HISTORY OF SUBSTANCE ABUSE                 FEMALE         MALE                                                Alcohol                             [  ]1 pt          [  ]3pts                                               Illegal Durgs                     [  ]2 pts        [  ]3pts                                               Rx Drugs                           [  ]4 pts        [  ]4 pts    PERSONAL HISTORY OF SUBSTANCE ABUSE                                                                                          Alcohol                             [  ]3 pts       [  ]3 pts                                               Illegal Drugs                     [  ]4 pts        [  ]4 pts                                               Rx Drugs                           [  ]5 pts        [  ]5 pts    AGE BETWEEN 16-45 YEARS                                      [  ]1 pt         [  ]1 pt    HISTORY OF PREADOLESCENT   SEXUAL ABUSE                                                             [  ]3 pts        [  ]0pts    PSYCHOLOGICAL DISEASE                     ADD, OCD, Bipolar, Schizophrenia        [  ]2 pts         [  ]2 pts                      Depression                                               [  ]1 pt           [  ]1 pt           SCORING TOTAL   (add numbers and type here)              (***0)                                     A score of 3 or lower indicated LOW risk for future opioid abuse  A score of 4 to 7 indicated moderate risk for future opioid abuse  A score of 8 or higher indicates a high risk for opioid abuse      CAPRINI SCORE    AGE RELATED RISK FACTORS                                                             [ ] Age 41-60 years                                            (1 Point)  [ ] Age: 61-74 years                                           (2 Points)                 [ x] Age= 75 years                                                (3 Points)             DISEASE RELATED RISK FACTORS                                                       [ ] Edema in the lower extremities                 (1 Point)                     [ ] Varicose veins                                               (1 Point)                                 [ ] BMI > 25 Kg/m2                                            (1 Point)                                  [ ] Serious infection (ie PNA)                            (1 Point)                     [ ] Lung disease ( COPD, Emphysema)            (1 Point)                                                                          [ ] Acute myocardial infarction                         (1 Point)                  [ ] Congestive heart failure (in the previous month)  (1 Point)         [ ] Inflammatory bowel disease                            (1 Point)                  [ ] Central venous access, PICC or Port               (2 points)       (within the last month)                                                                [ ] Stroke (in the previous month)                        (5 Points)    [ ] Previous or present malignancy                       (2 points)                                                                                                                                                         HEMATOLOGY RELATED FACTORS                                                         [ ] Prior episodes of VTE                                     (3 Points)                     [ ] Positive family history for VTE                      (3 Points)                  [ ] Prothrombin 31848 A                                     (3 Points)                     [ ] Factor V Leiden                                                (3 Points)                        [ ] Lupus anticoagulants                                      (3 Points)                                                           [ ] Anticardiolipin antibodies                              (3 Points)                                                       [ ] High homocysteine in the blood                   (3 Points)                                             [ ] Other congenital or acquired thrombophilia      (3 Points)                                                [ ] Heparin induced thrombocytopenia                  (3 Points)                                        MOBILITY RELATED FACTORS  [ ] Bed rest                                                         (1 Point)  [ ] Plaster cast                                                    (2 points)  [ ] Bed bound for more than 72 hours           (2 Points)    GENDER SPECIFIC FACTORS  [ ] Pregnancy or had a baby within the last month   (1 Point)  [ ] Post-partum < 6 weeks                                   (1 Point)  [ ] Hormonal therapy  or oral contraception   (1 Point)  [ ] History of pregnancy complications              (1 point)  [ ] Unexplained or recurrent              (1 Point)    OTHER RISK FACTORS                                           (1 Point)  [ ] BMI >40, smoking, diabetes requiring insulin, chemotherapy  blood transfusions and length of surgery over 2 hours    SURGERY RELATED RISK FACTORS  [ ]  Section within the last month     (1 Point)  [ ] Minor surgery                                                  (1 Point)  [ ] Arthroscopic surgery                                       (2 Points)  [ ] Planned major surgery lasting more            (2 Points)      than 45 minutes     [ x] Elective hip or knee joint replacement       (5 points)       surgery                                                TRAUMA RELATED RISK FACTORS  [ ] Fracture of the hip, pelvis, or leg                       (5 Points)  [ ] Spinal cord injury resulting in paralysis             (5 points)       (in the previous month)    [ ] Paralysis  (less than 1 month)                             (5 Points)  [ ] Multiple Trauma within 1 month                        (5 Points)    Total Score [      8  ]    Caprini Score 0-2: Low Risk, NO VTE prophylaxis required for most patients, encourage ambulation  Caprini Score 3-6: Moderate Risk , pharmacologic VTE prophylaxis is indicated for most patients (in the absence of contraindications)  Caprini Score Greater than or =7: High risk, pharmocologic VTE prophylaxis indicated for most patients (in the absence of contraindications)

## 2024-12-13 NOTE — H&P PST ADULT - NSANTHOSAYNRD_GEN_A_CORE
No. TIGIST screening performed.  STOP BANG Legend: 0-2 = LOW Risk; 3-4 = INTERMEDIATE Risk; 5-8 = HIGH Risk

## 2024-12-13 NOTE — H&P PST ADULT - PROBLEM SELECTOR PLAN 1
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       -Medical evaluation pending  -Cardiac evaluation pending  - Educated on ERP protocol   -Educated on NSAIDS, multivitamins and herbals that increase the risk of bleeding and need to be stopped 5 days before procedure  -Educated on infection prevention  -Tylenol can be taken if needed for pain  -Eliquis per cardiologist  -Will continue all other medications as prescribed  -Pt. daughter and  vrbalized understanding of all instructions.

## 2024-12-13 NOTE — H&P PST ADULT - MUSCULOSKELETAL
details… Weakness to RUE/no joint swelling/no joint erythema/no joint warmth/strength 5/5 bilateral lower extremities

## 2024-12-13 NOTE — H&P PST ADULT - NSICDXPASTSURGICALHX_GEN_ALL_CORE_FT
PAST SURGICAL HISTORY:  H/O hand surgery     H/O laminectomy lumbar    H/O: hysterectomy     History of appendectomy     History of back surgery     S/P tonsillectomy

## 2024-12-14 ENCOUNTER — APPOINTMENT (OUTPATIENT)
Dept: CT IMAGING | Facility: CLINIC | Age: 88
End: 2024-12-14
Payer: MEDICARE

## 2024-12-14 ENCOUNTER — OUTPATIENT (OUTPATIENT)
Dept: OUTPATIENT SERVICES | Facility: HOSPITAL | Age: 88
LOS: 1 days | End: 2024-12-14

## 2024-12-14 DIAGNOSIS — M87.052 IDIOPATHIC ASEPTIC NECROSIS OF LEFT FEMUR: ICD-10-CM

## 2024-12-14 DIAGNOSIS — Z87.81 PERSONAL HISTORY OF (HEALED) TRAUMATIC FRACTURE: ICD-10-CM

## 2024-12-14 DIAGNOSIS — Z90.89 ACQUIRED ABSENCE OF OTHER ORGANS: Chronic | ICD-10-CM

## 2024-12-14 DIAGNOSIS — Z90.49 ACQUIRED ABSENCE OF OTHER SPECIFIED PARTS OF DIGESTIVE TRACT: Chronic | ICD-10-CM

## 2024-12-14 DIAGNOSIS — Z98.89 OTHER SPECIFIED POSTPROCEDURAL STATES: Chronic | ICD-10-CM

## 2024-12-14 DIAGNOSIS — Z98.890 OTHER SPECIFIED POSTPROCEDURAL STATES: Chronic | ICD-10-CM

## 2024-12-14 DIAGNOSIS — Z90.710 ACQUIRED ABSENCE OF BOTH CERVIX AND UTERUS: Chronic | ICD-10-CM

## 2024-12-14 PROCEDURE — 73700 CT LOWER EXTREMITY W/O DYE: CPT | Mod: 26,LT

## 2024-12-31 RX ORDER — KETOROLAC TROMETHAMINE 30 MG/ML
15 INJECTION INTRAMUSCULAR; INTRAVENOUS ONCE
Refills: 0 | Status: DISCONTINUED | OUTPATIENT
Start: 2025-01-02 | End: 2025-01-07

## 2024-12-31 RX ORDER — CEFAZOLIN SODIUM 1 G
2000 VIAL (EA) INJECTION ONCE
Refills: 0 | Status: DISCONTINUED | OUTPATIENT
Start: 2025-01-02 | End: 2025-01-02

## 2024-12-31 RX ORDER — TRANEXAMIC ACID 650 MG/1
1000 TABLET ORAL ONCE
Refills: 0 | Status: DISCONTINUED | OUTPATIENT
Start: 2025-01-02 | End: 2025-01-02

## 2025-01-02 ENCOUNTER — APPOINTMENT (OUTPATIENT)
Dept: ORTHOPEDIC SURGERY | Facility: HOSPITAL | Age: 89
End: 2025-01-02

## 2025-01-02 ENCOUNTER — TRANSCRIPTION ENCOUNTER (OUTPATIENT)
Age: 89
End: 2025-01-02

## 2025-01-02 ENCOUNTER — INPATIENT (INPATIENT)
Facility: HOSPITAL | Age: 89
LOS: 4 days | Discharge: HOME HEALTH | DRG: 554 | End: 2025-01-07
Attending: STUDENT IN AN ORGANIZED HEALTH CARE EDUCATION/TRAINING PROGRAM | Admitting: STUDENT IN AN ORGANIZED HEALTH CARE EDUCATION/TRAINING PROGRAM
Payer: MEDICARE

## 2025-01-02 VITALS
SYSTOLIC BLOOD PRESSURE: 159 MMHG | TEMPERATURE: 99 F | HEART RATE: 66 BPM | WEIGHT: 120.81 LBS | RESPIRATION RATE: 20 BRPM | OXYGEN SATURATION: 100 % | DIASTOLIC BLOOD PRESSURE: 75 MMHG

## 2025-01-02 DIAGNOSIS — Z90.49 ACQUIRED ABSENCE OF OTHER SPECIFIED PARTS OF DIGESTIVE TRACT: Chronic | ICD-10-CM

## 2025-01-02 DIAGNOSIS — Z90.710 ACQUIRED ABSENCE OF BOTH CERVIX AND UTERUS: Chronic | ICD-10-CM

## 2025-01-02 DIAGNOSIS — M87.052 IDIOPATHIC ASEPTIC NECROSIS OF LEFT FEMUR: ICD-10-CM

## 2025-01-02 DIAGNOSIS — Z98.89 OTHER SPECIFIED POSTPROCEDURAL STATES: Chronic | ICD-10-CM

## 2025-01-02 DIAGNOSIS — Z98.890 OTHER SPECIFIED POSTPROCEDURAL STATES: Chronic | ICD-10-CM

## 2025-01-02 DIAGNOSIS — Z90.89 ACQUIRED ABSENCE OF OTHER ORGANS: Chronic | ICD-10-CM

## 2025-01-02 LAB — BLD GP AB SCN SERPL QL: SIGNIFICANT CHANGE UP

## 2025-01-02 PROCEDURE — 27132 TOTAL HIP ARTHROPLASTY: CPT | Mod: AS,LT

## 2025-01-02 PROCEDURE — 73502 X-RAY EXAM HIP UNI 2-3 VIEWS: CPT | Mod: 26,LT

## 2025-01-02 PROCEDURE — 99222 1ST HOSP IP/OBS MODERATE 55: CPT

## 2025-01-02 PROCEDURE — 20985 CPTR-ASST DIR MS PX: CPT

## 2025-01-02 PROCEDURE — 27132 TOTAL HIP ARTHROPLASTY: CPT | Mod: LT

## 2025-01-02 DEVICE — MAKO BONE PIN 4MM X 170MM: Type: IMPLANTABLE DEVICE | Site: LEFT | Status: FUNCTIONAL

## 2025-01-02 DEVICE — LINER ACET TRIDENT X3 0 DEG 36MM D: Type: IMPLANTABLE DEVICE | Site: LEFT | Status: FUNCTIONAL

## 2025-01-02 DEVICE — SHELL ACET TRIDENT II D 50MM: Type: IMPLANTABLE DEVICE | Site: LEFT | Status: FUNCTIONAL

## 2025-01-02 DEVICE — MAKO KNEE TIBIAL CHECKPOINT: Type: IMPLANTABLE DEVICE | Site: LEFT | Status: FUNCTIONAL

## 2025-01-02 DEVICE — FEM HD CERAMIC ART BIOLOX DELTA: Type: IMPLANTABLE DEVICE | Site: LEFT | Status: FUNCTIONAL

## 2025-01-02 DEVICE — SCREW HEX LO PROF 6.5X35MM: Type: IMPLANTABLE DEVICE | Site: LEFT | Status: FUNCTIONAL

## 2025-01-02 DEVICE — STEM FEM ACTIS HIGH COLLAR SZ 6: Type: IMPLANTABLE DEVICE | Site: LEFT | Status: FUNCTIONAL

## 2025-01-02 DEVICE — SCREW HEX LO PROF 6.5X15MM: Type: IMPLANTABLE DEVICE | Site: LEFT | Status: FUNCTIONAL

## 2025-01-02 RX ORDER — MAGNESIUM HYDROXIDE 400 MG/5ML
30 SUSPENSION, ORAL (FINAL DOSE FORM) ORAL DAILY
Refills: 0 | Status: DISCONTINUED | OUTPATIENT
Start: 2025-01-02 | End: 2025-01-07

## 2025-01-02 RX ORDER — ACETAMINOPHEN 80 MG/.8ML
1000 SOLUTION/ DROPS ORAL ONCE
Refills: 0 | Status: COMPLETED | OUTPATIENT
Start: 2025-01-02 | End: 2025-01-03

## 2025-01-02 RX ORDER — SODIUM CHLORIDE 9 MG/ML
1000 INJECTION, SOLUTION INTRAMUSCULAR; INTRAVENOUS; SUBCUTANEOUS
Refills: 0 | Status: DISCONTINUED | OUTPATIENT
Start: 2025-01-02 | End: 2025-01-04

## 2025-01-02 RX ORDER — OXYCODONE HCL 15 MG
10 TABLET ORAL
Refills: 0 | Status: DISCONTINUED | OUTPATIENT
Start: 2025-01-02 | End: 2025-01-03

## 2025-01-02 RX ORDER — SPIRONOLACTONE 50 MG/1
25 TABLET ORAL DAILY
Refills: 0 | Status: DISCONTINUED | OUTPATIENT
Start: 2025-01-02 | End: 2025-01-04

## 2025-01-02 RX ORDER — POLYETHYLENE GLYCOL 3350 17 G/DOSE
17 POWDER (GRAM) ORAL AT BEDTIME
Refills: 0 | Status: DISCONTINUED | OUTPATIENT
Start: 2025-01-02 | End: 2025-01-07

## 2025-01-02 RX ORDER — CEFAZOLIN SODIUM 1 G
2000 VIAL (EA) INJECTION
Refills: 0 | Status: COMPLETED | OUTPATIENT
Start: 2025-01-02 | End: 2025-01-03

## 2025-01-02 RX ORDER — ASPIRIN 81 MG
81 TABLET, DELAYED RELEASE (ENTERIC COATED) ORAL DAILY
Refills: 0 | Status: DISCONTINUED | OUTPATIENT
Start: 2025-01-02 | End: 2025-01-07

## 2025-01-02 RX ORDER — SPIRONOLACTONE 25 MG
1 TABLET ORAL
Refills: 0 | DISCHARGE

## 2025-01-02 RX ORDER — FAMOTIDINE 20 MG/1
20 TABLET, FILM COATED ORAL ONCE
Refills: 0 | Status: COMPLETED | OUTPATIENT
Start: 2025-01-02 | End: 2025-01-02

## 2025-01-02 RX ORDER — PANTOPRAZOLE 40 MG/1
40 TABLET, DELAYED RELEASE ORAL
Refills: 0 | Status: DISCONTINUED | OUTPATIENT
Start: 2025-01-02 | End: 2025-01-07

## 2025-01-02 RX ORDER — ACETAMINOPHEN 80 MG/.8ML
975 SOLUTION/ DROPS ORAL ONCE
Refills: 0 | Status: COMPLETED | OUTPATIENT
Start: 2025-01-02 | End: 2025-01-02

## 2025-01-02 RX ORDER — METOPROLOL TARTRATE 50 MG
50 TABLET ORAL
Refills: 0 | Status: DISCONTINUED | OUTPATIENT
Start: 2025-01-03 | End: 2025-01-04

## 2025-01-02 RX ORDER — LEVOTHYROXINE SODIUM 175 UG/1
50 TABLET ORAL DAILY
Refills: 0 | Status: DISCONTINUED | OUTPATIENT
Start: 2025-01-02 | End: 2025-01-07

## 2025-01-02 RX ORDER — ACETAMINOPHEN 80 MG/.8ML
975 SOLUTION/ DROPS ORAL EVERY 8 HOURS
Refills: 0 | Status: DISCONTINUED | OUTPATIENT
Start: 2025-01-02 | End: 2025-01-07

## 2025-01-02 RX ORDER — SODIUM CHLORIDE 9 MG/ML
1000 INJECTION, SOLUTION INTRAVENOUS
Refills: 0 | Status: DISCONTINUED | OUTPATIENT
Start: 2025-01-02 | End: 2025-01-02

## 2025-01-02 RX ORDER — APIXABAN 5 MG/1
2.5 TABLET, FILM COATED ORAL
Refills: 0 | Status: DISCONTINUED | OUTPATIENT
Start: 2025-01-03 | End: 2025-01-07

## 2025-01-02 RX ORDER — GABAPENTIN 300 MG/1
300 CAPSULE ORAL AT BEDTIME
Refills: 0 | Status: DISCONTINUED | OUTPATIENT
Start: 2025-01-02 | End: 2025-01-07

## 2025-01-02 RX ORDER — KETOROLAC TROMETHAMINE 30 MG/ML
15 INJECTION INTRAMUSCULAR; INTRAVENOUS EVERY 6 HOURS
Refills: 0 | Status: DISCONTINUED | OUTPATIENT
Start: 2025-01-02 | End: 2025-01-03

## 2025-01-02 RX ORDER — OXYCODONE HCL 15 MG
5 TABLET ORAL
Refills: 0 | Status: DISCONTINUED | OUTPATIENT
Start: 2025-01-02 | End: 2025-01-03

## 2025-01-02 RX ORDER — DIPHENHYDRAMINE HCL 25 MG
12.5 TABLET ORAL ONCE
Refills: 0 | Status: COMPLETED | OUTPATIENT
Start: 2025-01-02 | End: 2025-01-02

## 2025-01-02 RX ORDER — ONDANSETRON 4 MG/1
4 TABLET ORAL EVERY 6 HOURS
Refills: 0 | Status: DISCONTINUED | OUTPATIENT
Start: 2025-01-02 | End: 2025-01-07

## 2025-01-02 RX ORDER — APREPITANT 40 MG/1
40 CAPSULE ORAL ONCE
Refills: 0 | Status: COMPLETED | OUTPATIENT
Start: 2025-01-02 | End: 2025-01-02

## 2025-01-02 RX ORDER — BISACODYL 5 MG
10 TABLET, DELAYED RELEASE (ENTERIC COATED) ORAL ONCE
Refills: 0 | Status: DISCONTINUED | OUTPATIENT
Start: 2025-01-04 | End: 2025-01-07

## 2025-01-02 RX ORDER — MAG HYDROX/ALUMINUM HYD/SIMETH 200-200-20
30 SUSPENSION, ORAL (FINAL DOSE FORM) ORAL
Refills: 0 | Status: DISCONTINUED | OUTPATIENT
Start: 2025-01-02 | End: 2025-01-07

## 2025-01-02 RX ORDER — SENNOSIDES 8.6 MG/1
2 TABLET, FILM COATED ORAL AT BEDTIME
Refills: 0 | Status: DISCONTINUED | OUTPATIENT
Start: 2025-01-02 | End: 2025-01-07

## 2025-01-02 RX ORDER — HYDROMORPHONE HCL 4 MG
4 TABLET ORAL
Refills: 0 | Status: DISCONTINUED | OUTPATIENT
Start: 2025-01-02 | End: 2025-01-03

## 2025-01-02 RX ORDER — ONDANSETRON 4 MG/1
4 TABLET ORAL ONCE
Refills: 0 | Status: DISCONTINUED | OUTPATIENT
Start: 2025-01-02 | End: 2025-01-02

## 2025-01-02 RX ADMIN — Medication 2000 MILLIGRAM(S): at 18:54

## 2025-01-02 RX ADMIN — Medication 12.5 MILLIGRAM(S): at 16:09

## 2025-01-02 RX ADMIN — FAMOTIDINE 20 MILLIGRAM(S): 20 TABLET, FILM COATED ORAL at 15:50

## 2025-01-02 RX ADMIN — ACETAMINOPHEN 975 MILLIGRAM(S): 80 SOLUTION/ DROPS ORAL at 09:44

## 2025-01-02 RX ADMIN — GABAPENTIN 300 MILLIGRAM(S): 300 CAPSULE ORAL at 22:48

## 2025-01-02 RX ADMIN — APREPITANT 40 MILLIGRAM(S): 40 CAPSULE ORAL at 09:48

## 2025-01-02 RX ADMIN — SODIUM CHLORIDE 3 MILLILITER(S): 9 INJECTION, SOLUTION INTRAMUSCULAR; INTRAVENOUS; SUBCUTANEOUS at 09:41

## 2025-01-02 RX ADMIN — ACETAMINOPHEN 975 MILLIGRAM(S): 80 SOLUTION/ DROPS ORAL at 23:50

## 2025-01-02 RX ADMIN — ACETAMINOPHEN 975 MILLIGRAM(S): 80 SOLUTION/ DROPS ORAL at 22:47

## 2025-01-02 NOTE — PATIENT PROFILE ADULT - FUNCTIONAL SCREEN CURRENT LEVEL: SWALLOWING (IF SCORE 2 OR MORE FOR ANY ITEM, CONSULT REHAB SERVICES), MLM)
Education Record    Learner:  Patient    Disease / Radha James blood work. Referred back from PCP. Barriers / Limitations:  None   Comments:    Method:  Discussion   Comments:    General Topics:  Plan of care reviewed   Comments:    Outcome:   Sh
0 = swallows foods/liquids without difficulty

## 2025-01-02 NOTE — CONSULT NOTE ADULT - SUBJECTIVE AND OBJECTIVE BOX
89 yo with Hx HTN afib s/p pacemaker, anemia,  HTN, Hypothyroidism, anxiety and depression, she has left hip pain from a fall 2023- completed ORIF then as well, Attends chiropractor initially 3x/week but now goes once every other week. Takes extra strength tylenol with minimal relief, she comes in here for elective left conversion total hip arthroplasty on 1/2/2024 s/p procedure.       Allergies:  	shellfish: Food, Rash  	Bactrim: Drug, Other, pt states she gets disorientated  	Robaxin: Drug, Other, pt states gets hot feeling on head  	Demerol HCl: Drug, Nausea, vomiting  	sulfa drugs: Drug Category, Other, Unknown- Reaction as a baby  	codeine: Drug, Unknown      PAST MEDICAL HISTORY:  Afib     Asthma     GERD (gastroesophageal reflux disease)     H/O CHF     HLD (hyperlipidemia)     Hypertension     Hyponatremia     Hypothyroidism     Nephrolithiasis     Pacemaker.     PAST SURGICAL HISTORY:  H/O hand surgery     H/O laminectomy lumbar    H/O: hysterectomy     History of appendectomy     History of back surgery     S/P tonsillectomy.     FAMILY HISTORY:  Mother  Still living? No  Family history of ovarian cancer, Age at diagnosis: Age Unknown.      Social History:   Not a smoker, drinker or using any drugs      Home Medications:   * Incomplete Medication History as of 13-Dec-2024 13:35 documented in Structured Notes  · 	sodium chloride 1 g oral tablet: Last Dose Taken:  , 1 tab(s) orally 3 times a day  · 	bisacodyl 5 mg oral delayed release tablet: Last Dose Taken:  , 1 tab(s) orally every 12 hours As needed Constipation  · 	gabapentin 300 mg oral capsule: Last Dose Taken:  , 1 cap(s) orally once a day (at bedtime)  · 	MiraLax oral powder for reconstitution: Last Dose Taken:  , 17 gram(s) orally once a day, As Needed for constipation   · 	acetaminophen 325 mg oral tablet: Last Dose Taken:  , 2 tab(s) orally every 6 hours, As Needed for mild to moderate pain  · 	apixaban 2.5 mg oral tablet: Last Dose Taken:  , 1 tab(s) orally 2 times a day  · 	multivitamin daily:   · 	Multiple Vitamins oral capsule: Last Dose Taken:  , 1 cap(s) orally once a day  · 	Calcium 600+D 600 mg-5 mcg (200 intl units) oral tablet: Last Dose Taken:  , 1 tab(s) orally once a day  · 	Vitamin C 500 mg oral tablet: Last Dose Taken:  , 1 tab(s) orally once a day  · 	spironolactone 25 mg oral tablet: 1 tab(s) orally once a day  · 	levothyroxine 50 mcg (0.05 mg) oral tablet: Last Dose Taken:  , 1 tab(s) orally once a day  · 	metoprolol tartrate 50 mg oral tablet: Last Dose Taken:  , 1 tab(s) orally 2 times a day     89 yo with Hx HTN afib s/p pacemaker, anemia,  HTN, Hypothyroidism, anxiety and depression, she has left hip pain from a fall 2023- completed ORIF then as well, Attends chiropractor initially 3x/week but now goes once every other week. Takes extra strength tylenol with minimal relief, she comes in here for elective left conversion total hip arthroplasty on 1/2/2024 s/p procedure, she is sleepy and feels cold.        Allergies:  	shellfish: Food, Rash  	Bactrim: Drug, Other, pt states she gets disorientated  	Robaxin: Drug, Other, pt states gets hot feeling on head  	Demerol HCl: Drug, Nausea, vomiting  	sulfa drugs: Drug Category, Other, Unknown- Reaction as a baby  	codeine: Drug, Unknown      PAST MEDICAL HISTORY:  Afib     Asthma     GERD (gastroesophageal reflux disease)     H/O CHF     HLD (hyperlipidemia)     Hypertension     Hyponatremia     Hypothyroidism     Nephrolithiasis     Pacemaker.     PAST SURGICAL HISTORY:  H/O hand surgery     H/O laminectomy lumbar    H/O: hysterectomy     History of appendectomy     History of back surgery     S/P tonsillectomy.     FAMILY HISTORY:  Mother  Still living? No  Family history of ovarian cancer, Age at diagnosis: Age Unknown.      Social History:   Not a smoker, drinker or using any drugs      Home Medications:   * Incomplete Medication History as of 13-Dec-2024 13:35 documented in Structured Notes  · 	sodium chloride 1 g oral tablet: Last Dose Taken:  , 1 tab(s) orally 3 times a day  · 	bisacodyl 5 mg oral delayed release tablet: Last Dose Taken:  , 1 tab(s) orally every 12 hours As needed Constipation  · 	gabapentin 300 mg oral capsule: Last Dose Taken:  , 1 cap(s) orally once a day (at bedtime)  · 	MiraLax oral powder for reconstitution: Last Dose Taken:  , 17 gram(s) orally once a day, As Needed for constipation   · 	acetaminophen 325 mg oral tablet: Last Dose Taken:  , 2 tab(s) orally every 6 hours, As Needed for mild to moderate pain  · 	apixaban 2.5 mg oral tablet: Last Dose Taken:  , 1 tab(s) orally 2 times a day  · 	multivitamin daily:   · 	Multiple Vitamins oral capsule: Last Dose Taken:  , 1 cap(s) orally once a day  · 	Calcium 600+D 600 mg-5 mcg (200 intl units) oral tablet: Last Dose Taken:  , 1 tab(s) orally once a day  · 	Vitamin C 500 mg oral tablet: Last Dose Taken:  , 1 tab(s) orally once a day  · 	spironolactone 25 mg oral tablet: 1 tab(s) orally once a day  · 	levothyroxine 50 mcg (0.05 mg) oral tablet: Last Dose Taken:  , 1 tab(s) orally once a day  · 	metoprolol tartrate 50 mg oral tablet: Last Dose Taken:  , 1 tab(s) orally 2 times a day    Vital Signs Last 24 Hrs  T(C): 36.3 (02 Jan 2025 16:00), Max: 37.1 (02 Jan 2025 09:34)  T(F): 97.3 (02 Jan 2025 16:00), Max: 98.7 (02 Jan 2025 09:34)  HR: 99 (02 Jan 2025 17:00) (66 - 99)  BP: 140/92 (02 Jan 2025 17:00) (110/93 - 167/68)  BP(mean): 101 (02 Jan 2025 17:00) (84 - 103)  RR: 12 (02 Jan 2025 17:00) (12 - 23)  SpO2: 98% (02 Jan 2025 17:00) (95% - 100%)    Parameters below as of 02 Jan 2025 17:00  Patient On (Oxygen Delivery Method): room air        PHYSICAL EXAM:    GENERAL: Elderly male looking sleepy   HEENT: atraumatic   NECK: soft, Supple, No JVD  CHEST/LUNG: Decrease air entry bilaterally; No wheezing  HEART: S1S2+, Regular rate and rhythm; No murmurs  ABDOMEN: Soft, Nontender, Nondistended; Bowel sounds present  EXTREMITIES:  1+ Peripheral Pulses, No edema, left hip Joint area cover with dressing, no bleeding or soaking   SKIN: No rashes or lesions  NEURO: sleepy

## 2025-01-02 NOTE — DISCHARGE NOTE PROVIDER - CARE PROVIDERS DIRECT ADDRESSES
[email or web address]

## 2025-01-02 NOTE — PROGRESS NOTE ADULT - SUBJECTIVE AND OBJECTIVE BOX
Ortho Post Op Check    Name: OFELIA VILLANUEVA    MR #: [id number]    Procedure: Left hip conversion Perc Pinning to total hip arthroplasty   Surgeon: Dr Sprague    Pt comfortable without complaints, pain controlled  Denies CP, SOB, N/V, numbness/tingling     General Exam:  Vital Signs Last 24 Hrs  T(C): 36.3 (01-02-25 @ 16:00), Max: 36.3 (01-02-25 @ 16:00)  T(F): 97.3 (01-02-25 @ 16:00), Max: 97.3 (01-02-25 @ 16:00)  HR: 99 (01-02-25 @ 17:00) (71 - 99)  BP: 140/92 (01-02-25 @ 17:00) (110/93 - 167/68)  BP(mean): 101 (01-02-25 @ 17:00) (84 - 103)  RR: 12 (01-02-25 @ 17:00) (12 - 23)  SpO2: 98% (01-02-25 @ 17:00) (95% - 100%)    General: Pt Alert and oriented, NAD, controlled pain.  Left hip mepilex dressings C/D/I. No bleeding.  Pulses: 1+ dorsalis pedis pulse. Right DP pulse faint. Bilateral PT pulses appreciated  Sensation: Grossly intact to light touch without deficit.  Left Calf soft and supple       MEDICATIONS  (STANDING):  aspirin enteric coated 81 milliGRAM(s) Oral daily  ceFAZolin  Injectable. 2000 milliGRAM(s) IV Push <User Schedule>  lactated ringers. 1000 milliLiter(s) (75 mL/Hr) IV Continuous <Continuous>  sodium chloride 0.9%. 1000 milliLiter(s) (75 mL/Hr) IV Continuous <Continuous>    MEDICATIONS  (PRN):  ondansetron Injectable 4 milliGRAM(s) IV Push once PRN Nausea and/or Vomiting      A/P: 89yFemale POD#0 s/p  Left hip conversion Perc Pinning to total hip arthroplasty  - Stable  - Pain Control  - DVT ppx: Eliquis to resume tomorrow  - Post op abx: Ancef  - PT eval pending  - Weight bearing status: WBAT w posterior precautions   - Patient has chronic tachy-shaq syndrome Afib. Has PPM. Cardiology out patient note reviewed

## 2025-01-02 NOTE — DISCHARGE NOTE PROVIDER - HOSPITAL COURSE
The patient underwent a LEFT POSTERIOR TOTAL HIP REPLACEMENT on 1/2/2025. The patient received antibiotics consistent with SCIP guidelines. The patient underwent the procedure and had no intra-operative complications. Post-operatively, the patient was seen by medicine and PT. The patient received ELIQUIS/ASPIRIN for DVTP. The patient received pain medications per orthopedic pain management pathway and the pain was appropriately controlled. Patient was instructed on posterior total hip precautions by PT. The patient did not have any post-operative medical complications. The patient was discharged in stable condition. The patient underwent a LEFT POSTERIOR TOTAL HIP REPLACEMENT on 1/2/2025. The patient received antibiotics consistent with SCIP guidelines. The patient underwent the procedure and had no intra-operative complications. Post-operatively, the patient was seen by medicine and PT. The patient received ELIQUIS/ ASPIRIN for DVTP. The patient received pain medications per orthopedic pain management pathway and the pain was appropriately controlled. Patient was instructed on posterior total hip precautions by PT. The patient did not have any post-operative medical complications. The patient was discharged in stable condition. The patient underwent a LEFT POSTERIOR TOTAL HIP REPLACEMENT on 1/2/2025. The patient received antibiotics consistent with SCIP guidelines. The patient underwent the procedure and had no intra-operative complications. Post-operatively, the patient was seen by medicine and PT. The patient received ELIQUIS for DVTP. The patient received pain medications per orthopedic pain management pathway and the pain was appropriately controlled. Patient was instructed on posterior total hip precautions by PT. The patient did not have any post-operative medical complications. The patient was discharged in stable condition.

## 2025-01-02 NOTE — DISCHARGE NOTE PROVIDER - NSDCMRMEDTOKEN_GEN_ALL_CORE_FT
acetaminophen 325 mg oral tablet: 2 tab(s) orally every 6 hours, As Needed for mild to moderate pain  apixaban 2.5 mg oral tablet: 1 tab(s) orally 2 times a day  Aspir 81 oral delayed release tablet: 1 tab(s) orally once a day  bisacodyl 5 mg oral delayed release tablet: 1 tab(s) orally every 12 hours As needed Constipation  Calcium 600+D 600 mg-5 mcg (200 intl units) oral tablet: 1 tab(s) orally once a day  gabapentin 300 mg oral capsule: 1 cap(s) orally once a day (at bedtime)  levothyroxine 50 mcg (0.05 mg) oral tablet: 1 tab(s) orally once a day  metoprolol tartrate 50 mg oral tablet: 1 tab(s) orally 2 times a day  MiraLax oral powder for reconstitution: 17 gram(s) orally once a day, As Needed for constipation   Multiple Vitamins oral capsule: 1 cap(s) orally once a day  multivitamin daily:   sodium chloride 1 g oral tablet: 1 tab(s) orally 3 times a day  spironolactone 25 mg oral tablet: 1 tab(s) orally once a day  Vitamin C 500 mg oral tablet: 1 tab(s) orally once a day   acetaminophen 325 mg oral tablet: 3 tab(s) orally every 8 hours  apixaban 2.5 mg oral tablet: 1 tab(s) orally 2 times a day  Aspir 81 oral delayed release tablet: 1 tab(s) orally once a day  bisacodyl 5 mg oral delayed release tablet: 1 tab(s) orally every 12 hours As needed Constipation  Calcium 600+D 600 mg-5 mcg (200 intl units) oral tablet: 1 tab(s) orally once a day  gabapentin 300 mg oral capsule: 1 cap(s) orally once a day (at bedtime)  levothyroxine 50 mcg (0.05 mg) oral tablet: 1 tab(s) orally once a day  metoprolol tartrate 50 mg oral tablet: 1 tab(s) orally 2 times a day  Multiple Vitamins oral capsule: 1 cap(s) orally once a day  multivitamin daily:   oxyCODONE 5 mg oral tablet: 1 tab(s) orally every 6 hours as needed for pain MDD: 4  pantoprazole 40 mg oral delayed release tablet: 1 tab(s) orally once a day (before a meal)  Senna S 50 mg-8.6 mg oral tablet: 2 tab(s) orally once a day  sodium chloride 1 g oral tablet: 1 tab(s) orally 3 times a day  spironolactone 25 mg oral tablet: 1 tab(s) orally once a day  Vitamin C 500 mg oral tablet: 1 tab(s) orally once a day   acetaminophen 325 mg oral tablet: 3 tab(s) orally every 8 hours  apixaban 2.5 mg oral tablet: 1 tab(s) orally 2 times a day  Aspir 81 oral delayed release tablet: 1 tab(s) orally once a day  bisacodyl 5 mg oral delayed release tablet: 1 tab(s) orally every 12 hours As needed Constipation  Calcium 600+D 600 mg-5 mcg (200 intl units) oral tablet: 1 tab(s) orally once a day  gabapentin 300 mg oral capsule: 1 cap(s) orally once a day (at bedtime)  levothyroxine 50 mcg (0.05 mg) oral tablet: 1 tab(s) orally once a day  metoprolol tartrate 50 mg oral tablet: 1 tab(s) orally 2 times a day  Mobic 7.5 mg oral tablet: 1 tab(s) orally 2 times a day  Multiple Vitamins oral capsule: 1 cap(s) orally once a day  multivitamin daily:   oxyCODONE 5 mg oral tablet: 1 tab(s) orally every 6 hours as needed for pain MDD: 4  pantoprazole 40 mg oral delayed release tablet: 1 tab(s) orally once a day (before a meal)  Senna S 50 mg-8.6 mg oral tablet: 2 tab(s) orally once a day  sodium chloride 1 g oral tablet: 1 tab(s) orally 3 times a day  spironolactone 25 mg oral tablet: 1 tab(s) orally once a day  Vitamin C 500 mg oral tablet: 1 tab(s) orally once a day   acetaminophen 325 mg oral tablet: 3 tab(s) orally every 8 hours  apixaban 2.5 mg oral tablet: 1 tab(s) orally 2 times a day  Aspir 81 oral delayed release tablet: 1 tab(s) orally once a day  bisacodyl 5 mg oral delayed release tablet: 1 tab(s) orally every 12 hours As needed Constipation  Calcium 600+D 600 mg-5 mcg (200 intl units) oral tablet: 1 tab(s) orally once a day  gabapentin 300 mg oral capsule: 1 cap(s) orally once a day (at bedtime)  levothyroxine 50 mcg (0.05 mg) oral tablet: 1 tab(s) orally once a day  metoprolol tartrate 50 mg oral tablet: 1 tab(s) orally 2 times a day  Mobic 7.5 mg oral tablet: 1 tab(s) orally 2 times a day  Multiple Vitamins oral capsule: 1 cap(s) orally once a day  multivitamin daily:   pantoprazole 40 mg oral delayed release tablet: 1 tab(s) orally once a day (before a meal)  Senna S 50 mg-8.6 mg oral tablet: 2 tab(s) orally once a day  sodium chloride 1 g oral tablet: 1 tab(s) orally 3 times a day  spironolactone 25 mg oral tablet: 1 tab(s) orally once a day  Vitamin C 500 mg oral tablet: 1 tab(s) orally once a day   acetaminophen 325 mg oral tablet: 3 tab(s) orally every 8 hours  apixaban 2.5 mg oral tablet: 1 tab(s) orally 2 times a day  Aspir 81 oral delayed release tablet: 1 tab(s) orally once a day  bisacodyl 5 mg oral delayed release tablet: 1 tab(s) orally every 12 hours As needed Constipation  Calcium 600+D 600 mg-5 mcg (200 intl units) oral tablet: 1 tab(s) orally once a day  gabapentin 300 mg oral capsule: 1 cap(s) orally once a day (at bedtime)  levothyroxine 50 mcg (0.05 mg) oral tablet: 1 tab(s) orally once a day  metoprolol tartrate 50 mg oral tablet: 1 tab(s) orally 2 times a day  Mobic 7.5 mg oral tablet: 1 tab(s) orally 2 times a day  Multiple Vitamins oral capsule: 1 cap(s) orally once a day  multivitamin daily:   pantoprazole 40 mg oral delayed release tablet: 1 tab(s) orally once a day (before a meal)  Senna S 50 mg-8.6 mg oral tablet: 2 tab(s) orally once a day  sodium chloride 1 g oral tablet: 1 tab(s) orally 3 times a day  spironolactone 25 mg oral tablet: 1 tab(s) orally once a day  traMADol 50 mg oral tablet: 1 tab(s) orally every 6 hours MDD: 4  Vitamin C 500 mg oral tablet: 1 tab(s) orally once a day   acetaminophen 325 mg oral tablet: 3 tab(s) orally every 8 hours  apixaban 2.5 mg oral tablet: 1 tab(s) orally 2 times a day  Aspir 81 oral delayed release tablet: 1 tab(s) orally once a day  bisacodyl 5 mg oral delayed release tablet: 1 tab(s) orally every 12 hours As needed Constipation  Calcium 600+D 600 mg-5 mcg (200 intl units) oral tablet: 1 tab(s) orally once a day  gabapentin 300 mg oral capsule: 1 cap(s) orally once a day (at bedtime)  levothyroxine 50 mcg (0.05 mg) oral tablet: 1 tab(s) orally once a day  Metoprolol Tartrate 25 mg oral tablet: 1 tab(s) orally 3 times a day  Mobic 7.5 mg oral tablet: 1 tab(s) orally 2 times a day  Multiple Vitamins oral capsule: 1 cap(s) orally once a day  multivitamin daily:   pantoprazole 40 mg oral delayed release tablet: 1 tab(s) orally once a day (before a meal)  Senna S 50 mg-8.6 mg oral tablet: 2 tab(s) orally once a day  sodium chloride 1 g oral tablet: 1 tab(s) orally 3 times a day  Sodium Chloride 1000 mg oral tablet, soluble: 2 tab(s) orally 2 times a day  spironolactone 25 mg oral tablet: 1 tab(s) orally once a day  traMADol 50 mg oral tablet: 1 tab(s) orally every 6 hours MDD: 4  Vitamin C 500 mg oral tablet: 1 tab(s) orally once a day   acetaminophen 325 mg oral tablet: 3 tab(s) orally every 8 hours  apixaban 2.5 mg oral tablet: 1 tab(s) orally 2 times a day  bisacodyl 5 mg oral delayed release tablet: 1 tab(s) orally every 12 hours As needed Constipation  Calcium 600+D 600 mg-5 mcg (200 intl units) oral tablet: 1 tab(s) orally once a day  gabapentin 300 mg oral capsule: 1 cap(s) orally once a day (at bedtime)  levothyroxine 50 mcg (0.05 mg) oral tablet: 1 tab(s) orally once a day  Metoprolol Tartrate 25 mg oral tablet: 1 tab(s) orally 3 times a day  Mobic 7.5 mg oral tablet: 1 tab(s) orally 2 times a day  Multiple Vitamins oral capsule: 1 cap(s) orally once a day  multivitamin daily:   pantoprazole 40 mg oral delayed release tablet: 1 tab(s) orally once a day (before a meal)  Senna S 50 mg-8.6 mg oral tablet: 2 tab(s) orally once a day  sodium chloride 1 g oral tablet: 1 tab(s) orally 3 times a day  Sodium Chloride 1000 mg oral tablet, soluble: 2 tab(s) orally 2 times a day  spironolactone 25 mg oral tablet: 1 tab(s) orally once a day  traMADol 50 mg oral tablet: 1 tab(s) orally every 6 hours MDD: 4  Vitamin C 500 mg oral tablet: 1 tab(s) orally once a day

## 2025-01-02 NOTE — DISCHARGE NOTE PROVIDER - NSDCFUADDINST_GEN_ALL_CORE_FT
The patient will be seen in the office between 2-3 weeks for wound check.   **Your first post-operative visit has been scheduled prior to your admission. PLEASE CONTACT OFFICE TO CONFIRM THE APPOINTMENT DATE.   **  The silver based dressing is to be removed 7 days from the date of surgery.   ** CONTACT THE OFFICE IF THE FOLLOWING DEVELOP:  - the dressing becomes soiled or saturated  - you develop a fever greater that 101F  - the wound becomes red or you develop blistering around the wound  * Patient may shower after post-op day #3.   * The patient will continue home PT consistent with posterior total hip replacement protocol and will continue to practice posterior total hip precautions for a minimum of 6 week. Transition to outpatient PT will occur at the time of the first office visit.   * The patient is FULL weight bearing.  * The patient will continue home dose ELIQUIS and ASPIRIN for 6 weeks after surgery for blood clot prevention.  * While on aspirin, the patient will take daily omeprazole or other similar medication to protect the stomach from irritation.   * The patient will take OXYCODONE AND TYLENOL for pain control and adjust according to prescription and patient needs. Contact the office if pain increases while taking prescribed pain medications or related concerns develop.  * Mobic will be taken twice daily for 3 weeks for pain control and prevention of excessive bone growth. Additional prescription may be requested at your office follow-up visit.  * The patient will take Senna S while taking oxycodone to prevent narcotic associated constipation.  Additionally, increase water intake (drink at least 8 glasses of water daily) and try adding fiber to the diet by eating fruits, vegetables and foods that are rich in grains. If constipation is experienced, contact the medical/primary care provider to discuss further treatment options.  * To avoid injury at home:  - continue use of rolling walker until cleared by physical therapist  - have family or friend remove all throw rug or objects in hallways that may present a trip hazard.  - if you experience any dizziness or medical concerns, call your medical doctor or  911.  * The implant may activate metal detection devices.  The patient will be seen in the office between 2-3 weeks for wound check.   **Your first post-operative visit has been scheduled prior to your admission. PLEASE CONTACT OFFICE TO CONFIRM THE APPOINTMENT DATE.   **  The silver based dressing is to be removed 7 days from the date of surgery.   ** CONTACT THE OFFICE IF THE FOLLOWING DEVELOP:  - the dressing becomes soiled or saturated  - you develop a fever greater that 101F  - the wound becomes red or you develop blistering around the wound  * Patient may shower after post-op day #3.   * The patient will continue home PT consistent with posterior total hip replacement protocol and will continue to practice posterior total hip precautions for a minimum of 6 week. Transition to outpatient PT will occur at the time of the first office visit.   * The patient is FULL weight bearing.  * The patient will continue home dose ELIQUIS and ASPIRIN for 6 weeks after surgery for blood clot prevention.  * While on aspirin, the patient will take daily omeprazole or other similar medication to protect the stomach from irritation.   * The patient will take TRAMADOL AND TYLENOL for pain control and adjust according to prescription and patient needs. Contact the office if pain increases while taking prescribed pain medications or related concerns develop.  * Mobic will be taken twice daily for 3 weeks for pain control and prevention of excessive bone growth. Additional prescription may be requested at your office follow-up visit.  * The patient will take Senna S while taking oxycodone to prevent narcotic associated constipation.  Additionally, increase water intake (drink at least 8 glasses of water daily) and try adding fiber to the diet by eating fruits, vegetables and foods that are rich in grains. If constipation is experienced, contact the medical/primary care provider to discuss further treatment options.  * To avoid injury at home:  - continue use of rolling walker until cleared by physical therapist  - have family or friend remove all throw rug or objects in hallways that may present a trip hazard.  - if you experience any dizziness or medical concerns, call your medical doctor or  911.  * The implant may activate metal detection devices.  The patient will be seen in the office between 2-3 weeks for wound check.   **Your first post-operative visit has been scheduled prior to your admission. PLEASE CONTACT OFFICE TO CONFIRM THE APPOINTMENT DATE.   **  The silver based dressing is to be removed 7 days from the date of surgery.   ** CONTACT THE OFFICE IF THE FOLLOWING DEVELOP:  - the dressing becomes soiled or saturated  - you develop a fever greater that 101F  - the wound becomes red or you develop blistering around the wound  * Patient may shower after post-op day #3.   * The patient will continue home PT consistent with posterior total hip replacement protocol and will continue to practice posterior total hip precautions for a minimum of 6 week. Transition to outpatient PT will occur at the time of the first office visit.   * The patient is FULL weight bearing.  * The patient will continue home dose ELIQUIS and ASPIRIN for 6 weeks after surgery for blood clot prevention.  * While on aspirin, the patient will take daily omeprazole or other similar medication to protect the stomach from irritation.   * The patient will take TRAMADOL AND TYLENOL for pain control and adjust according to prescription and patient needs. Contact the office if pain increases while taking prescribed pain medications or related concerns develop.  * Mobic will be taken twice daily for 3 weeks for pain control and prevention of excessive bone growth. Additional prescription may be requested at your office follow-up visit.  * The patient will take Senna S while taking oxycodone to prevent narcotic associated constipation.  Additionally, increase water intake (drink at least 8 glasses of water daily) and try adding fiber to the diet by eating fruits, vegetables and foods that are rich in grains. If constipation is experienced, contact the medical/primary care provider to discuss further treatment options.  * nephrology recommends an increase in sodium to 2g twice a day, follow- up with Dr. Oneill, the nephrologist, in 1 week   * To avoid injury at home:  - continue use of rolling walker until cleared by physical therapist  - have family or friend remove all throw rug or objects in hallways that may present a trip hazard.  - if you experience any dizziness or medical concerns, call your medical doctor or  911.  * The implant may activate metal detection devices.     * follow up outpatient with primary care physician within 2 weeks for refill of blood pressure medicine (metoprolol tartrate)  The patient will be seen in the office between 2-3 weeks for wound check.   **Your first post-operative visit has been scheduled prior to your admission. PLEASE CONTACT OFFICE TO CONFIRM THE APPOINTMENT DATE.   **  The silver based dressing is to be removed 7 days from the date of surgery.   ** CONTACT THE OFFICE IF THE FOLLOWING DEVELOP:  - the dressing becomes soiled or saturated  - you develop a fever greater that 101F  - the wound becomes red or you develop blistering around the wound  * Patient may shower after post-op day #3.   * The patient will continue home PT consistent with posterior total hip replacement protocol and will continue to practice posterior total hip precautions for a minimum of 6 week. Transition to outpatient PT will occur at the time of the first office visit.   * The patient is FULL weight bearing.  * The patient will continue home dose ELIQUIS   * The patient will take TRAMADOL AND TYLENOL for pain control and adjust according to prescription and patient needs. Contact the office if pain increases while taking prescribed pain medications or related concerns develop.  * Mobic will be taken twice daily for 3 weeks for pain control and prevention of excessive bone growth. Additional prescription may be requested at your office follow-up visit.  * The patient will take Senna S while taking oxycodone to prevent narcotic associated constipation.  Additionally, increase water intake (drink at least 8 glasses of water daily) and try adding fiber to the diet by eating fruits, vegetables and foods that are rich in grains. If constipation is experienced, contact the medical/primary care provider to discuss further treatment options.  * nephrology recommends an increase in sodium to 2g twice a day, follow- up with Dr. Oneill, the nephrologist, in 1 week   * To avoid injury at home:  - continue use of rolling walker until cleared by physical therapist  - have family or friend remove all throw rug or objects in hallways that may present a trip hazard.  - if you experience any dizziness or medical concerns, call your medical doctor or  911.  * The implant may activate metal detection devices.     * follow up outpatient with primary care physician within 2 weeks for refill of blood pressure medicine (metoprolol tartrate)

## 2025-01-02 NOTE — PHYSICAL THERAPY INITIAL EVALUATION ADULT - GENERAL OBSERVATIONS, REHAB EVAL
Pt received in bed with bed alarm, IV, cardiac monitor, , abduction pillow, NAD.  and daughter at bedside. Pt agreeable to PT, reported pain in left hip.

## 2025-01-02 NOTE — BRIEF OPERATIVE NOTE - OPERATION/FINDINGS
left femur avn, subchondral collapse, oa, protrusion of screws  left conversion jose armando /w nick

## 2025-01-02 NOTE — PATIENT PROFILE ADULT - FUNCTIONAL SCREEN CURRENT LEVEL: COMMUNICATION, MLM
Returned pt phone call regarding her mammogram order. After verifying verbal release, left LVM. Informed pt that a mammogram order has been placed as well as the ultrasound. Left the number to call central scheduling at [phone].  Emphasized that she 2 = difficulty understanding (not related to language barrier)

## 2025-01-02 NOTE — PHYSICAL THERAPY INITIAL EVALUATION ADULT - ACTIVE RANGE OF MOTION EXAMINATION, REHAB EVAL
decreased left hip AROM/bilateral upper extremity Active ROM was WFL (within functional limits)/Right LE Active ROM was WFL (within functional limits)/deficits as listed below

## 2025-01-02 NOTE — CONSULT NOTE ADULT - ASSESSMENT
89 yo with Hx HTN afib s/p pacemaker, anemia,  HTN, Hypothyroidism, anxiety and depression, she has left hip pain from a fall 2023- completed ORIF then as well, Attends chiropractor initially 3x/week but now goes once every other week. Takes extra strength tylenol with minimal relief, she comes in here for elective left conversion total hip arthroplasty on 1/2/2024 s/p procedure.       Plan:     Left hip pain s/p left conversion total hip arthroplasty post op day 0:     - post op no complications  - VS stable  - abx per ortho   - c/w anti hypertensive to be restarted post op day 02 except if blood pressure goes >150 systolic   - c/w IVF x 24 hrs then reassess per ortho  - opiate induced constipation regimen   - encouraging incentive spirometry   -c/w local wound care per ortho   -DVT prophylaxis and Pain meds as per Ortho team   -PT/OT and weight bearing per ortho   -gabapentin 300 mg once a day (at bedtime)      Hyponatremia, chronic   Will continue with her salt tabs  will monitor BMP   will limit IV fluids      chronic afib   -will resume eliquis once ok from the Ortho  -cont metoprolol     HFpEF   HTN   -cont metoprolol  -will limit IV fluids   -will continue with her spironolactone 25 mg once a day    Hypothyroid   -cont synthroid      89 yo with Hx HTN afib s/p pacemaker, anemia,  HTN, Hypothyroidism, anxiety and depression, she has left hip pain from a fall 2023- completed ORIF then as well, Attends chiropractor initially 3x/week but now goes once every other week. Takes extra strength tylenol with minimal relief, she comes in here for elective left conversion total hip arthroplasty on 1/2/2024 s/p procedure.       Plan:     Left hip pain s/p left conversion total hip arthroplasty post op day 0:     - VS stable  - abx per ortho   - c/w anti hypertensive to be restarted post op day 02 except if blood pressure goes >150 systolic   - c/w IVF x 24 hrs then reassess per ortho, will monitor for volume over load  - opiate induced constipation regimen   - encouraging incentive spirometry   -c/w local wound care per ortho   -DVT prophylaxis and Pain meds as per Ortho team   -PT/OT and weight bearing per ortho   -gabapentin 300 mg once a day (at bedtime)      Hyponatremia, chronic   Will continue with her salt tabs  will monitor BMP   will limit IV fluids      chronic afib   -will resume eliquis once ok from the Ortho  -cont metoprolol     HFpEF   HTN   -cont metoprolol  -will limit IV fluids   -will continue with her spironolactone 25 mg once a day    Hypothyroid   -cont synthroid

## 2025-01-02 NOTE — PHYSICAL THERAPY INITIAL EVALUATION ADULT - ADDITIONAL COMMENTS
as per pt and daughter, pt lives in private house with , has an elevator, no stairs, independent prior, owns a RW, commode, and WC, stated that at baseline, gets dizzy frequently

## 2025-01-02 NOTE — DISCHARGE NOTE PROVIDER - CARE PROVIDER_API CALL
Alexy Sprague  Joint Reconstruction  [address] [place] [address]  Phone: [phone]  Fax: [phone]  Follow Up Time:    Alexy Sprague  Joint Reconstruction  [address] [place] [address]  Phone: [phone]  Fax: [phone]  Follow Up Time:     Erik Oneill  Nephrology  [address] [place] [id number]  Phone: [phone]  Fax: [phone]  Follow Up Time:

## 2025-01-02 NOTE — DISCHARGE NOTE PROVIDER - NSDCCPCAREPLAN_GEN_ALL_CORE_FT
PRINCIPAL DISCHARGE DIAGNOSIS  Diagnosis: Avascular necrosis of left femur  Assessment and Plan of Treatment:

## 2025-01-02 NOTE — PHYSICAL THERAPY INITIAL EVALUATION ADULT - LEVEL OF INDEPENDENCE: GAIT, REHAB EVAL
performed 6 stationary marches with RW and cg, reported pain and requested to sit, declined ambulation/unable to perform

## 2025-01-02 NOTE — DISCHARGE NOTE PROVIDER - NSDCFUSCHEDAPPT_GEN_ALL_CORE_FT
Alexy Sprague  [facility]  ORTHOSURG [address]  Scheduled Appointment: 01/23/2025    [facility]  ORTHOR [address]  Scheduled Appointment: 02/13/2025    Alexy Sprague  [facility]  ORTHORG [address]  Scheduled Appointment: 03/27/2025

## 2025-01-02 NOTE — DISCHARGE NOTE PROVIDER - NSDCACTIVITY_GEN_ALL_CORE
Follow Instructions Provided by your Surgical Team Do not make important decisions/No heavy lifting/straining/Follow Instructions Provided by your Surgical Team

## 2025-01-02 NOTE — PROGRESS NOTE ADULT - SUBJECTIVE AND OBJECTIVE BOX
Pelvis & hip films reviewed. Implants are in appropriate position. No fracture or dislocation noted. Patient is WBAT of the surgical extremity.

## 2025-01-02 NOTE — PATIENT PROFILE ADULT - FALL HARM RISK - HARM RISK INTERVENTIONS
Assistance with ambulation/Assistance OOB with selected safe patient handling equipment/Communicate Risk of Fall with Harm to all staff/Discuss with provider need for PT consult/Monitor gait and stability/Provide patient with walking aids - walker, cane, crutches/Reinforce activity limits and safety measures with patient and family/Sit up slowly, dangle for a short time, stand at bedside before walking/Tailored Fall Risk Interventions/Use of alarms - bed, chair and/or voice tab/Visual Cue: Yellow wristband and red socks/Bed in lowest position, wheels locked, appropriate side rails in place/Call bell, personal items and telephone in reach/Instruct patient to call for assistance before getting out of bed or chair/Non-slip footwear when patient is out of bed/[place] to call system/Physically safe environment - no spills, clutter or unnecessary equipment/Purposeful Proactive Rounding/Room/bathroom lighting operational, light cord in reach

## 2025-01-02 NOTE — DISCHARGE NOTE PROVIDER - NSDCCPTREATMENT_GEN_ALL_CORE_FT
PRINCIPAL PROCEDURE  Procedure: Conversion, previous hip surgery to total hip replacement  Findings and Treatment:

## 2025-01-03 ENCOUNTER — TRANSCRIPTION ENCOUNTER (OUTPATIENT)
Age: 89
End: 2025-01-03

## 2025-01-03 LAB
ANION GAP SERPL CALC-SCNC: 14 MMOL/L — SIGNIFICANT CHANGE UP (ref 5–17)
ANION GAP SERPL CALC-SCNC: 15 MMOL/L — SIGNIFICANT CHANGE UP (ref 5–17)
BUN SERPL-MCNC: 16.6 MG/DL — SIGNIFICANT CHANGE UP (ref 8–20)
BUN SERPL-MCNC: 19.4 MG/DL — SIGNIFICANT CHANGE UP (ref 8–20)
CALCIUM SERPL-MCNC: 8.4 MG/DL — SIGNIFICANT CHANGE UP (ref 8.4–10.5)
CALCIUM SERPL-MCNC: 8.5 MG/DL — SIGNIFICANT CHANGE UP (ref 8.4–10.5)
CHLORIDE SERPL-SCNC: 96 MMOL/L — SIGNIFICANT CHANGE UP (ref 96–108)
CHLORIDE SERPL-SCNC: 98 MMOL/L — SIGNIFICANT CHANGE UP (ref 96–108)
CO2 SERPL-SCNC: 19 MMOL/L — LOW (ref 22–29)
CO2 SERPL-SCNC: 20 MMOL/L — LOW (ref 22–29)
CREAT SERPL-MCNC: 1.06 MG/DL — SIGNIFICANT CHANGE UP (ref 0.5–1.3)
CREAT SERPL-MCNC: 1.23 MG/DL — SIGNIFICANT CHANGE UP (ref 0.5–1.3)
EGFR: 41 ML/MIN/1.73M2 — LOW
EGFR: 49 ML/MIN/1.73M2 — LOW
GLUCOSE SERPL-MCNC: 139 MG/DL — HIGH (ref 70–99)
GLUCOSE SERPL-MCNC: 155 MG/DL — HIGH (ref 70–99)
HCT VFR BLD CALC: 22.3 % — LOW (ref 34.5–45)
HCT VFR BLD CALC: 22.6 % — LOW (ref 34.5–45)
HCT VFR BLD CALC: 25.9 % — LOW (ref 34.5–45)
HGB BLD-MCNC: 7.5 G/DL — LOW (ref 11.5–15.5)
HGB BLD-MCNC: 7.6 G/DL — LOW (ref 11.5–15.5)
HGB BLD-MCNC: 8.6 G/DL — LOW (ref 11.5–15.5)
MAGNESIUM SERPL-MCNC: 1.4 MG/DL — LOW (ref 1.8–2.6)
MCHC RBC-ENTMCNC: 32.8 PG — SIGNIFICANT CHANGE UP (ref 27–34)
MCHC RBC-ENTMCNC: 33.2 G/DL — SIGNIFICANT CHANGE UP (ref 32–36)
MCHC RBC-ENTMCNC: 33.6 G/DL — SIGNIFICANT CHANGE UP (ref 32–36)
MCHC RBC-ENTMCNC: 33.6 G/DL — SIGNIFICANT CHANGE UP (ref 32–36)
MCHC RBC-ENTMCNC: 33.9 PG — SIGNIFICANT CHANGE UP (ref 27–34)
MCHC RBC-ENTMCNC: 33.9 PG — SIGNIFICANT CHANGE UP (ref 27–34)
MCV RBC AUTO: 100.9 FL — HIGH (ref 80–100)
MCV RBC AUTO: 100.9 FL — HIGH (ref 80–100)
MCV RBC AUTO: 98.9 FL — SIGNIFICANT CHANGE UP (ref 80–100)
PHOSPHATE SERPL-MCNC: 3.5 MG/DL — SIGNIFICANT CHANGE UP (ref 2.4–4.7)
PLATELET # BLD AUTO: 110 K/UL — LOW (ref 150–400)
PLATELET # BLD AUTO: 114 K/UL — LOW (ref 150–400)
PLATELET # BLD AUTO: 116 K/UL — LOW (ref 150–400)
POTASSIUM SERPL-MCNC: 4.8 MMOL/L — SIGNIFICANT CHANGE UP (ref 3.5–5.3)
POTASSIUM SERPL-MCNC: 5.5 MMOL/L — HIGH (ref 3.5–5.3)
POTASSIUM SERPL-SCNC: 4.8 MMOL/L — SIGNIFICANT CHANGE UP (ref 3.5–5.3)
POTASSIUM SERPL-SCNC: 5.5 MMOL/L — HIGH (ref 3.5–5.3)
RBC # BLD: 2.21 M/UL — LOW (ref 3.8–5.2)
RBC # BLD: 2.24 M/UL — LOW (ref 3.8–5.2)
RBC # BLD: 2.62 M/UL — LOW (ref 3.8–5.2)
RBC # FLD: 12.7 % — SIGNIFICANT CHANGE UP (ref 10.3–14.5)
RBC # FLD: 12.8 % — SIGNIFICANT CHANGE UP (ref 10.3–14.5)
RBC # FLD: 13.8 % — SIGNIFICANT CHANGE UP (ref 10.3–14.5)
SODIUM SERPL-SCNC: 130 MMOL/L — LOW (ref 135–145)
SODIUM SERPL-SCNC: 132 MMOL/L — LOW (ref 135–145)
WBC # BLD: 11.41 K/UL — HIGH (ref 3.8–10.5)
WBC # BLD: 7.13 K/UL — SIGNIFICANT CHANGE UP (ref 3.8–10.5)
WBC # BLD: 9 K/UL — SIGNIFICANT CHANGE UP (ref 3.8–10.5)
WBC # FLD AUTO: 11.41 K/UL — HIGH (ref 3.8–10.5)
WBC # FLD AUTO: 7.13 K/UL — SIGNIFICANT CHANGE UP (ref 3.8–10.5)
WBC # FLD AUTO: 9 K/UL — SIGNIFICANT CHANGE UP (ref 3.8–10.5)

## 2025-01-03 PROCEDURE — 99232 SBSQ HOSP IP/OBS MODERATE 35: CPT

## 2025-01-03 RX ORDER — OXYCODONE HCL 15 MG
1 TABLET ORAL
Qty: 28 | Refills: 0
Start: 2025-01-03

## 2025-01-03 RX ORDER — PANTOPRAZOLE 40 MG/1
1 TABLET, DELAYED RELEASE ORAL
Qty: 0 | Refills: 0 | DISCHARGE
Start: 2025-01-03

## 2025-01-03 RX ORDER — MELOXICAM 15 MG
1 TABLET ORAL
Qty: 42 | Refills: 0
Start: 2025-01-03

## 2025-01-03 RX ORDER — MAGNESIUM SULFATE 500 MG/ML
2 INJECTION, SOLUTION INTRAMUSCULAR; INTRAVENOUS ONCE
Refills: 0 | Status: COMPLETED | OUTPATIENT
Start: 2025-01-03 | End: 2025-01-03

## 2025-01-03 RX ORDER — TRAMADOL HYDROCHLORIDE 50 MG/1
25 TABLET ORAL EVERY 4 HOURS
Refills: 0 | Status: DISCONTINUED | OUTPATIENT
Start: 2025-01-03 | End: 2025-01-07

## 2025-01-03 RX ORDER — ACETAMINOPHEN 80 MG/.8ML
3 SOLUTION/ DROPS ORAL
Qty: 0 | Refills: 0 | DISCHARGE
Start: 2025-01-03

## 2025-01-03 RX ORDER — TRAMADOL HYDROCHLORIDE 50 MG/1
50 TABLET ORAL EVERY 4 HOURS
Refills: 0 | Status: DISCONTINUED | OUTPATIENT
Start: 2025-01-03 | End: 2025-01-07

## 2025-01-03 RX ORDER — SENNOSIDES AND DOCUSATE SODIUM 50; 8.6 MG/1; MG/1
2 TABLET ORAL
Qty: 30 | Refills: 0
Start: 2025-01-03

## 2025-01-03 RX ADMIN — KETOROLAC TROMETHAMINE 15 MILLIGRAM(S): 30 INJECTION INTRAMUSCULAR; INTRAVENOUS at 01:57

## 2025-01-03 RX ADMIN — MAGNESIUM SULFATE 25 GRAM(S): 500 INJECTION, SOLUTION INTRAMUSCULAR; INTRAVENOUS at 16:56

## 2025-01-03 RX ADMIN — LEVOTHYROXINE SODIUM 50 MICROGRAM(S): 175 TABLET ORAL at 05:05

## 2025-01-03 RX ADMIN — ACETAMINOPHEN 975 MILLIGRAM(S): 80 SOLUTION/ DROPS ORAL at 14:18

## 2025-01-03 RX ADMIN — GABAPENTIN 300 MILLIGRAM(S): 300 CAPSULE ORAL at 21:28

## 2025-01-03 RX ADMIN — APIXABAN 2.5 MILLIGRAM(S): 5 TABLET, FILM COATED ORAL at 05:05

## 2025-01-03 RX ADMIN — KETOROLAC TROMETHAMINE 15 MILLIGRAM(S): 30 INJECTION INTRAMUSCULAR; INTRAVENOUS at 05:05

## 2025-01-03 RX ADMIN — SENNOSIDES 2 TABLET(S): 8.6 TABLET, FILM COATED ORAL at 21:26

## 2025-01-03 RX ADMIN — SODIUM CHLORIDE 75 MILLILITER(S): 9 INJECTION, SOLUTION INTRAMUSCULAR; INTRAVENOUS; SUBCUTANEOUS at 21:25

## 2025-01-03 RX ADMIN — Medication 30 MILLILITER(S): at 23:13

## 2025-01-03 RX ADMIN — PANTOPRAZOLE 40 MILLIGRAM(S): 40 TABLET, DELAYED RELEASE ORAL at 05:05

## 2025-01-03 RX ADMIN — KETOROLAC TROMETHAMINE 15 MILLIGRAM(S): 30 INJECTION INTRAMUSCULAR; INTRAVENOUS at 06:05

## 2025-01-03 RX ADMIN — ACETAMINOPHEN 975 MILLIGRAM(S): 80 SOLUTION/ DROPS ORAL at 21:26

## 2025-01-03 RX ADMIN — ACETAMINOPHEN 400 MILLIGRAM(S): 80 SOLUTION/ DROPS ORAL at 05:04

## 2025-01-03 RX ADMIN — KETOROLAC TROMETHAMINE 15 MILLIGRAM(S): 30 INJECTION INTRAMUSCULAR; INTRAVENOUS at 16:56

## 2025-01-03 RX ADMIN — ACETAMINOPHEN 975 MILLIGRAM(S): 80 SOLUTION/ DROPS ORAL at 22:20

## 2025-01-03 RX ADMIN — ACETAMINOPHEN 1000 MILLIGRAM(S): 80 SOLUTION/ DROPS ORAL at 06:00

## 2025-01-03 RX ADMIN — KETOROLAC TROMETHAMINE 15 MILLIGRAM(S): 30 INJECTION INTRAMUSCULAR; INTRAVENOUS at 17:11

## 2025-01-03 RX ADMIN — KETOROLAC TROMETHAMINE 15 MILLIGRAM(S): 30 INJECTION INTRAMUSCULAR; INTRAVENOUS at 00:57

## 2025-01-03 RX ADMIN — Medication 2000 MILLIGRAM(S): at 00:57

## 2025-01-03 RX ADMIN — ACETAMINOPHEN 975 MILLIGRAM(S): 80 SOLUTION/ DROPS ORAL at 13:48

## 2025-01-03 RX ADMIN — Medication 17 GRAM(S): at 21:28

## 2025-01-03 RX ADMIN — APIXABAN 2.5 MILLIGRAM(S): 5 TABLET, FILM COATED ORAL at 16:56

## 2025-01-03 RX ADMIN — Medication 81 MILLIGRAM(S): at 13:48

## 2025-01-03 NOTE — PROGRESS NOTE ADULT - SUBJECTIVE AND OBJECTIVE BOX
s/p Left hip conversion Perc Pinning to total hip arthroplasty p-od #1      Pt comfortable without complaints, pain controlled  Denies CP, SOB, N/V, numbness/tingling     General Exam:  Vital Signs Last 24 Hrs  T(C): 36.6 (03 Jan 2025 08:23), Max: 37.1 (02 Jan 2025 09:34)  T(F): 97.8 (03 Jan 2025 08:23), Max: 98.8 (02 Jan 2025 20:14)  HR: 107 (03 Jan 2025 08:23) (66 - 112)  BP: 110/57 (03 Jan 2025 08:23) (107/70 - 167/68)  BP(mean): 101 (02 Jan 2025 17:00) (84 - 103)  RR: 20 (03 Jan 2025 08:23) (12 - 23)  SpO2: 100% (03 Jan 2025 08:23) (95% - 100%)    Parameters below as of 03 Jan 2025 08:23  Patient On (Oxygen Delivery Method): room air        General: Pt Alert and oriented, NAD, controlled pain.  Left hip mepilex dressings C/D/I. No bleeding.  Pulses: 1+ dorsalis pedis pulse. Right DP pulse faint. Bilateral PT pulses appreciated  Sensation: Grossly intact to light touch without deficit.  Left Calf soft and supple       MEDICATIONS  (STANDING):  aspirin enteric coated 81 milliGRAM(s) Oral daily  ceFAZolin  Injectable. 2000 milliGRAM(s) IV Push <User Schedule>  lactated ringers. 1000 milliLiter(s) (75 mL/Hr) IV Continuous <Continuous>  sodium chloride 0.9%. 1000 milliLiter(s) (75 mL/Hr) IV Continuous <Continuous>    MEDICATIONS  (PRN):  ondansetron Injectable 4 milliGRAM(s) IV Push once PRN Nausea and/or Vomiting      A/P: 89yFemale POD#1 s/p  Left hip conversion Perc Pinning to total hip arthroplasty  - Stable  - Pain Control  - DVT ppx: Eliquis/asa  - PT   - Weight bearing status: WBAT w posterior precautions   - discharge home when PT/med cleared

## 2025-01-03 NOTE — PROGRESS NOTE ADULT - SUBJECTIVE AND OBJECTIVE BOX
OFELIA GUNDERSONMARIA ELENA    [id number]    89y      Female    Patient is a 89y old  Female who presents with a chief complaint of Left total hip arthroplasty  (02 Jan 2025 14:04)      INTERVAL HPI/OVERNIGHT EVENTS:        Vital Signs Last 24 Hrs  T(C): 36.3 (03 Jan 2025 04:52), Max: 37.1 (02 Jan 2025 09:34)  T(F): 97.4 (03 Jan 2025 04:52), Max: 98.8 (02 Jan 2025 20:14)  HR: 97 (03 Jan 2025 04:52) (66 - 112)  BP: 107/70 (03 Jan 2025 04:52) (107/70 - 167/68)  BP(mean): 101 (02 Jan 2025 17:00) (84 - 103)  RR: 18 (03 Jan 2025 04:52) (12 - 23)  SpO2: 98% (03 Jan 2025 04:52) (95% - 100%)    Parameters below as of 03 Jan 2025 04:52  Patient On (Oxygen Delivery Method): room air        PHYSICAL EXAM:          I&O's Summary    02 Jan 2025 07:01  -  03 Jan 2025 06:25  --------------------------------------------------------  IN: 945 mL / OUT: 200 mL / NET: 745 mL        MEDICATIONS  (STANDING):  acetaminophen     Tablet .. 975 milliGRAM(s) Oral every 8 hours  apixaban 2.5 milliGRAM(s) Oral two times a day  aspirin enteric coated 81 milliGRAM(s) Oral daily  gabapentin 300 milliGRAM(s) Oral at bedtime  ketorolac   Injectable 15 milliGRAM(s) IV Push once  ketorolac   Injectable 15 milliGRAM(s) IV Push every 6 hours  levothyroxine 50 MICROGram(s) Oral daily  metoprolol tartrate 50 milliGRAM(s) Oral two times a day  pantoprazole    Tablet 40 milliGRAM(s) Oral before breakfast  polyethylene glycol 3350 17 Gram(s) Oral at bedtime  senna 2 Tablet(s) Oral at bedtime  sodium chloride 0.9%. 1000 milliLiter(s) (75 mL/Hr) IV Continuous <Continuous>  spironolactone 25 milliGRAM(s) Oral daily    MEDICATIONS  (PRN):  aluminum hydroxide/magnesium hydroxide/simethicone Suspension 30 milliLiter(s) Oral four times a day PRN Indigestion  HYDROmorphone   Tablet 4 milliGRAM(s) Oral every 3 hours PRN Severe Pain (7 - 10)  magnesium hydroxide Suspension 30 milliLiter(s) Oral daily PRN Constipation  ondansetron Injectable 4 milliGRAM(s) IV Push every 6 hours PRN Nausea and/or Vomiting  oxyCODONE    IR 5 milliGRAM(s) Oral every 3 hours PRN Mild Pain (1 - 3)  oxyCODONE    IR 10 milliGRAM(s) Oral every 3 hours PRN Moderate Pain (4 - 6)         OFELIA GUNDERSONMARIA ELENA    [id number]    89y      Female    Patient is a 89y old  Female who presents with a chief complaint of Left total hip arthroplasty  (02 Jan 2025 14:04)      INTERVAL HPI/OVERNIGHT EVENTS:      patient is feeling dizziness and tiredness, denies fever, chills, chest pain, sob      Vital Signs Last 24 Hrs  T(C): 36.3 (03 Jan 2025 04:52), Max: 37.1 (02 Jan 2025 09:34)  T(F): 97.4 (03 Jan 2025 04:52), Max: 98.8 (02 Jan 2025 20:14)  HR: 97 (03 Jan 2025 04:52) (66 - 112)  BP: 107/70 (03 Jan 2025 04:52) (107/70 - 167/68)  BP(mean): 101 (02 Jan 2025 17:00) (84 - 103)  RR: 18 (03 Jan 2025 04:52) (12 - 23)  SpO2: 98% (03 Jan 2025 04:52) (95% - 100%)    Parameters below as of 03 Jan 2025 04:52  Patient On (Oxygen Delivery Method): room air        PHYSICAL EXAM:  GENERAL: Elderly male looking comfortable   HEENT: atraumatic   NECK: soft, Supple, No JVD  CHEST/LUNG: Decrease air entry bilaterally; No wheezing  HEART: S1S2+, Regular rate and rhythm; No murmurs  ABDOMEN: Soft, Nontender, Nondistended; Bowel sounds present  EXTREMITIES:  1+ Peripheral Pulses, No edema, left hip Joint area cover with dressing, no bleeding or soaking   SKIN: No rashes or lesions  NEURO: AOX3      I&O's Summary    02 Jan 2025 07:01  -  03 Jan 2025 06:25  --------------------------------------------------------  IN: 945 mL / OUT: 200 mL / NET: 745 mL        MEDICATIONS  (STANDING):  acetaminophen     Tablet .. 975 milliGRAM(s) Oral every 8 hours  apixaban 2.5 milliGRAM(s) Oral two times a day  aspirin enteric coated 81 milliGRAM(s) Oral daily  gabapentin 300 milliGRAM(s) Oral at bedtime  ketorolac   Injectable 15 milliGRAM(s) IV Push once  ketorolac   Injectable 15 milliGRAM(s) IV Push every 6 hours  levothyroxine 50 MICROGram(s) Oral daily  metoprolol tartrate 50 milliGRAM(s) Oral two times a day  pantoprazole    Tablet 40 milliGRAM(s) Oral before breakfast  polyethylene glycol 3350 17 Gram(s) Oral at bedtime  senna 2 Tablet(s) Oral at bedtime  sodium chloride 0.9%. 1000 milliLiter(s) (75 mL/Hr) IV Continuous <Continuous>  spironolactone 25 milliGRAM(s) Oral daily    MEDICATIONS  (PRN):  aluminum hydroxide/magnesium hydroxide/simethicone Suspension 30 milliLiter(s) Oral four times a day PRN Indigestion  HYDROmorphone   Tablet 4 milliGRAM(s) Oral every 3 hours PRN Severe Pain (7 - 10)  magnesium hydroxide Suspension 30 milliLiter(s) Oral daily PRN Constipation  ondansetron Injectable 4 milliGRAM(s) IV Push every 6 hours PRN Nausea and/or Vomiting  oxyCODONE    IR 5 milliGRAM(s) Oral every 3 hours PRN Mild Pain (1 - 3)  oxyCODONE    IR 10 milliGRAM(s) Oral every 3 hours PRN Moderate Pain (4 - 6)

## 2025-01-03 NOTE — DISCHARGE NOTE NURSING/CASE MANAGEMENT/SOCIAL WORK - FINANCIAL ASSISTANCE
[facility] provides services at a reduced cost to those who are determined to be eligible through [facility]’s financial assistance program. Information regarding [facility]’s financial assistance program can be found by going to [email or web address] or by calling [phone].

## 2025-01-03 NOTE — PROGRESS NOTE ADULT - ASSESSMENT
89 yo with Hx HTN afib s/p pacemaker, anemia,  HTN, Hypothyroidism, anxiety and depression, she has left hip pain from a fall 2023- completed ORIF then as well, Attends chiropractor initially 3x/week but now goes once every other week. Takes extra strength tylenol with minimal relief, she comes in here for elective left conversion total hip arthroplasty on 1/2/2024 s/p procedure.       Plan:     Left hip pain s/p left conversion total hip arthroplasty post op day 01:     - VS stable  - abx per ortho   - c/w anti hypertensive to be restarted post op day 02 except if blood pressure goes >150 systolic   - c/w IVF x 24 hrs then reassess per ortho, will monitor for volume over load  - opiate induced constipation regimen   - encouraging incentive spirometry   -c/w local wound care per ortho   -DVT prophylaxis and Pain meds as per Ortho team   -PT/OT and weight bearing per ortho   -gabapentin 300 mg once a day (at bedtime)      Hyponatremia, chronic   Will continue with her salt tabs  will monitor BMP   will limit IV fluids      chronic afib   -will resume eliquis once ok from the Ortho  -cont metoprolol     HFpEF   HTN   -cont metoprolol  -will limit IV fluids   -will continue with her spironolactone 25 mg once a day    Hypothyroid   -cont synthroid.       89 yo with Hx HTN afib s/p pacemaker, anemia,  HTN, Hypothyroidism, anxiety and depression, she has left hip pain from a fall 2023- completed ORIF then as well, Attends chiropractor initially 3x/week but now goes once every other week. Takes extra strength tylenol with minimal relief, she comes in here for elective left conversion total hip arthroplasty on 1/2/2024 s/p procedure.       Plan:     Left hip pain s/p left conversion total hip arthroplasty post op day 01:     - VS stable  - abx per ortho   - c/w anti hypertensive to be restarted post op day 02 except if blood pressure goes >150 systolic   - c/w IVF x 24 hrs then reassess per ortho, will monitor for volume over load  - opiate induced constipation regimen   - encouraging incentive spirometry   -c/w local wound care per ortho   -DVT prophylaxis and Pain meds as per Ortho team   -PT/OT and weight bearing per ortho   -gabapentin 300 mg once a day (at bedtime)  -would avoid Narcotics       Hyponatremia, chronic   Will continue with her salt tabs  will monitor BMP   will limit IV fluids      Hyperkalemia: will repeat level is still high will give lokelma    Acute blood loss anemia due to procedure in setting of chronic anemia: Iron supplement, will repeat with type and screen, if Hb is low will transfuse     chronic afib   -will resume eliquis once ok from the Ortho  -cont metoprolol home dose     HFpEF   HTN   -cont metoprolol  -will limit IV fluids   -will continue with her spironolactone 25 mg once a day    Hypothyroid   -cont synthroid.

## 2025-01-03 NOTE — DISCHARGE NOTE NURSING/CASE MANAGEMENT/SOCIAL WORK - NSDCVIVACCINE_GEN_ALL_CORE_FT
influenza, high-dose, quadrivalent; 20-Oct-2023 13:25; Jesus Esparza (RN); [employer]; [id number] (Exp. Date: 20-Jun-2024); IntraMuscular; Deltoid Left.; 0.7 milliLiter(s); VIS (VIS Published: 06-Aug-2021, VIS Presented: 20-Oct-2023);

## 2025-01-03 NOTE — DISCHARGE NOTE NURSING/CASE MANAGEMENT/SOCIAL WORK - PATIENT PORTAL LINK FT
You can access the FollowMyHealth Patient Portal offered by [facility] by registering at the following website: [email or web address]. By joining [employer]’s FollowMyHealth portal, you will also be able to view your health information using other applications (apps) compatible with our system.

## 2025-01-04 LAB
ANION GAP SERPL CALC-SCNC: 12 MMOL/L — SIGNIFICANT CHANGE UP (ref 5–17)
BUN SERPL-MCNC: 28.5 MG/DL — HIGH (ref 8–20)
CALCIUM SERPL-MCNC: 7.8 MG/DL — LOW (ref 8.4–10.5)
CHLORIDE SERPL-SCNC: 92 MMOL/L — LOW (ref 96–108)
CO2 SERPL-SCNC: 20 MMOL/L — LOW (ref 22–29)
CREAT SERPL-MCNC: 1.39 MG/DL — HIGH (ref 0.5–1.3)
EGFR: 35 ML/MIN/1.73M2 — LOW
GLUCOSE SERPL-MCNC: 112 MG/DL — HIGH (ref 70–99)
HCT VFR BLD CALC: 25.1 % — LOW (ref 34.5–45)
HGB BLD-MCNC: 8.4 G/DL — LOW (ref 11.5–15.5)
MAGNESIUM SERPL-MCNC: 3.3 MG/DL — HIGH (ref 1.8–2.6)
MCHC RBC-ENTMCNC: 32.4 PG — SIGNIFICANT CHANGE UP (ref 27–34)
MCHC RBC-ENTMCNC: 33.5 G/DL — SIGNIFICANT CHANGE UP (ref 32–36)
MCV RBC AUTO: 96.9 FL — SIGNIFICANT CHANGE UP (ref 80–100)
PLATELET # BLD AUTO: 103 K/UL — LOW (ref 150–400)
POTASSIUM SERPL-MCNC: 5.2 MMOL/L — SIGNIFICANT CHANGE UP (ref 3.5–5.3)
POTASSIUM SERPL-SCNC: 5.2 MMOL/L — SIGNIFICANT CHANGE UP (ref 3.5–5.3)
RBC # BLD: 2.59 M/UL — LOW (ref 3.8–5.2)
RBC # FLD: 14.9 % — HIGH (ref 10.3–14.5)
SODIUM SERPL-SCNC: 124 MMOL/L — LOW (ref 135–145)
WBC # BLD: 8.52 K/UL — SIGNIFICANT CHANGE UP (ref 3.8–10.5)
WBC # FLD AUTO: 8.52 K/UL — SIGNIFICANT CHANGE UP (ref 3.8–10.5)

## 2025-01-04 PROCEDURE — 99233 SBSQ HOSP IP/OBS HIGH 50: CPT

## 2025-01-04 RX ORDER — SODIUM CHLORIDE 9 MG/ML
500 INJECTION, SOLUTION INTRAMUSCULAR; INTRAVENOUS; SUBCUTANEOUS ONCE
Refills: 0 | Status: DISCONTINUED | OUTPATIENT
Start: 2025-01-04 | End: 2025-01-04

## 2025-01-04 RX ORDER — SODIUM CHLORIDE 9 MG/ML
500 INJECTION, SOLUTION INTRAMUSCULAR; INTRAVENOUS; SUBCUTANEOUS ONCE
Refills: 0 | Status: COMPLETED | OUTPATIENT
Start: 2025-01-04 | End: 2025-01-04

## 2025-01-04 RX ORDER — MAGNESIUM SULFATE 500 MG/ML
2 INJECTION, SOLUTION INTRAMUSCULAR; INTRAVENOUS ONCE
Refills: 0 | Status: COMPLETED | OUTPATIENT
Start: 2025-01-04 | End: 2025-01-04

## 2025-01-04 RX ORDER — SODIUM CHLORIDE 9 MG/ML
1000 INJECTION, SOLUTION INTRAMUSCULAR; INTRAVENOUS; SUBCUTANEOUS
Refills: 0 | Status: COMPLETED | OUTPATIENT
Start: 2025-01-04 | End: 2025-01-04

## 2025-01-04 RX ORDER — METOPROLOL TARTRATE 50 MG
50 TABLET ORAL
Refills: 0 | Status: DISCONTINUED | OUTPATIENT
Start: 2025-01-04 | End: 2025-01-04

## 2025-01-04 RX ORDER — TRAMADOL HYDROCHLORIDE 50 MG/1
1 TABLET ORAL
Qty: 28 | Refills: 0
Start: 2025-01-04 | End: 2025-01-10

## 2025-01-04 RX ORDER — SODIUM CHLORIDE 9 MG/ML
1 INJECTION, SOLUTION INTRAMUSCULAR; INTRAVENOUS; SUBCUTANEOUS THREE TIMES A DAY
Refills: 0 | Status: DISCONTINUED | OUTPATIENT
Start: 2025-01-04 | End: 2025-01-04

## 2025-01-04 RX ORDER — METOPROLOL TARTRATE 50 MG
25 TABLET ORAL
Refills: 0 | Status: DISCONTINUED | OUTPATIENT
Start: 2025-01-04 | End: 2025-01-05

## 2025-01-04 RX ORDER — SODIUM CHLORIDE 9 MG/ML
1 INJECTION, SOLUTION INTRAMUSCULAR; INTRAVENOUS; SUBCUTANEOUS THREE TIMES A DAY
Refills: 0 | Status: DISCONTINUED | OUTPATIENT
Start: 2025-01-04 | End: 2025-01-05

## 2025-01-04 RX ADMIN — SODIUM CHLORIDE 250 MILLILITER(S): 9 INJECTION, SOLUTION INTRAMUSCULAR; INTRAVENOUS; SUBCUTANEOUS at 14:21

## 2025-01-04 RX ADMIN — Medication 50 MILLIGRAM(S): at 10:23

## 2025-01-04 RX ADMIN — PANTOPRAZOLE 40 MILLIGRAM(S): 40 TABLET, DELAYED RELEASE ORAL at 05:31

## 2025-01-04 RX ADMIN — SPIRONOLACTONE 25 MILLIGRAM(S): 50 TABLET ORAL at 10:23

## 2025-01-04 RX ADMIN — APIXABAN 2.5 MILLIGRAM(S): 5 TABLET, FILM COATED ORAL at 18:04

## 2025-01-04 RX ADMIN — ACETAMINOPHEN 975 MILLIGRAM(S): 80 SOLUTION/ DROPS ORAL at 12:25

## 2025-01-04 RX ADMIN — GABAPENTIN 300 MILLIGRAM(S): 300 CAPSULE ORAL at 22:16

## 2025-01-04 RX ADMIN — SODIUM CHLORIDE 1 GRAM(S): 9 INJECTION, SOLUTION INTRAMUSCULAR; INTRAVENOUS; SUBCUTANEOUS at 22:16

## 2025-01-04 RX ADMIN — SODIUM CHLORIDE 125 MILLILITER(S): 9 INJECTION, SOLUTION INTRAMUSCULAR; INTRAVENOUS; SUBCUTANEOUS at 10:25

## 2025-01-04 RX ADMIN — APIXABAN 2.5 MILLIGRAM(S): 5 TABLET, FILM COATED ORAL at 05:31

## 2025-01-04 RX ADMIN — SODIUM CHLORIDE 50 MILLILITER(S): 9 INJECTION, SOLUTION INTRAMUSCULAR; INTRAVENOUS; SUBCUTANEOUS at 14:20

## 2025-01-04 RX ADMIN — ACETAMINOPHEN 975 MILLIGRAM(S): 80 SOLUTION/ DROPS ORAL at 06:30

## 2025-01-04 RX ADMIN — ACETAMINOPHEN 975 MILLIGRAM(S): 80 SOLUTION/ DROPS ORAL at 23:35

## 2025-01-04 RX ADMIN — LEVOTHYROXINE SODIUM 50 MICROGRAM(S): 175 TABLET ORAL at 04:42

## 2025-01-04 RX ADMIN — ACETAMINOPHEN 975 MILLIGRAM(S): 80 SOLUTION/ DROPS ORAL at 22:16

## 2025-01-04 RX ADMIN — Medication 81 MILLIGRAM(S): at 12:24

## 2025-01-04 RX ADMIN — MAGNESIUM SULFATE 25 GRAM(S): 500 INJECTION, SOLUTION INTRAMUSCULAR; INTRAVENOUS at 10:25

## 2025-01-04 RX ADMIN — SODIUM CHLORIDE 1 GRAM(S): 9 INJECTION, SOLUTION INTRAMUSCULAR; INTRAVENOUS; SUBCUTANEOUS at 18:05

## 2025-01-04 RX ADMIN — ACETAMINOPHEN 975 MILLIGRAM(S): 80 SOLUTION/ DROPS ORAL at 05:30

## 2025-01-04 RX ADMIN — ACETAMINOPHEN 975 MILLIGRAM(S): 80 SOLUTION/ DROPS ORAL at 13:20

## 2025-01-04 NOTE — PROGRESS NOTE ADULT - ASSESSMENT
89 yo with Hx HTN afib s/p pacemaker, anemia,  HTN, Hypothyroidism, anxiety and depression, she has left hip pain from a fall 2023- completed ORIF then as well, Attends chiropractor initially 3x/week but now goes once every other week. Takes extra strength tylenol with minimal relief, she comes in here for elective left conversion total hip arthroplasty on 1/2/2024 s/p procedure.       Plan:     Left hip pain s/p left conversion total hip arthroplasty post op day 02:   PT/OT/pain mgmt  DVT prophylaxis- as per ortho  Abx as per SCIP  Incentive spirometry  Prophylaxis of opioid  induced constipation.  Wound care as per ortho     Hyponatremia, chronic - likely due to hypovolemia   Bolus given - f/u labs - ordered STAT     Hyperkalemia- resolved     Acute blood loss anemia due to procedure in setting of chronic anemia: EBL- 100 ml , Hg yesterday noted - 7.5 , s/p 1 U PRBC,   repeated Hg yesterday 8.8 - will repeat STAT H/H , transfuse if Hg < 8      Episode of dizziness , hypotensive - SBP - 80's - likely due to hypovolemia ,   bolus 500 ml over  2 hrs ordered , continue mild hydration 50 ml till am due to poor oral fluid intake  , hold Spironolactone       chronic AFib   - continue  eliquis  -cont metoprolol home dose with parameters     Hypomagnesemia - will supplement     HFpEF -  HTN   -cont metoprolol  cautious  IV fluids due to hypotension   -hold spironolactone 25 mg once a day    Hypothyroid   -cont synthroid.      Not stable to for discharge today.  Will follow along with you

## 2025-01-04 NOTE — PROGRESS NOTE ADULT - SUBJECTIVE AND OBJECTIVE BOX
OFELIA     [id number]    History: Patient is status post left posterior total hip arthroplasty, POD # 2. Transfused 1 unit PRBC 1/3/25. Patient is doing well. The patient's pain is controlled using the prescribed pain medications. The patient is participating in physical therapy. Denies nausea, vomiting, chest pain, shortness of breath, abdominal pain or fever. No new complaints.                        8.6    11.41 )-----------( 116      ( 03 Jan 2025 19:26 )             25.9     01-03    130[L]  |  96  |  19.4  ----------------------------<  139[H]  4.8   |  19.0[L]  |  1.23    Ca    8.4      03 Jan 2025 10:11  Phos  3.5     01-03  Mg     1.4     01-03    MEDICATIONS  (STANDING):  acetaminophen     Tablet .. 975 milliGRAM(s) Oral every 8 hours  apixaban 2.5 milliGRAM(s) Oral two times a day  aspirin enteric coated 81 milliGRAM(s) Oral daily  gabapentin 300 milliGRAM(s) Oral at bedtime  ketorolac   Injectable 15 milliGRAM(s) IV Push once  levothyroxine 50 MICROGram(s) Oral daily  magnesium sulfate  IVPB 2 Gram(s) IV Intermittent once  metoprolol tartrate 50 milliGRAM(s) Oral two times a day  pantoprazole    Tablet 40 milliGRAM(s) Oral before breakfast  polyethylene glycol 3350 17 Gram(s) Oral at bedtime  senna 2 Tablet(s) Oral at bedtime  sodium chloride 0.9%. 1000 milliLiter(s) (125 mL/Hr) IV Continuous <Continuous>  spironolactone 25 milliGRAM(s) Oral daily    MEDICATIONS  (PRN):  aluminum hydroxide/magnesium hydroxide/simethicone Suspension 30 milliLiter(s) Oral four times a day PRN Indigestion  bisacodyl Suppository 10 milliGRAM(s) Rectal once PRN Constipation  magnesium hydroxide Suspension 30 milliLiter(s) Oral daily PRN Constipation  ondansetron Injectable 4 milliGRAM(s) IV Push every 6 hours PRN Nausea and/or Vomiting  traMADol 25 milliGRAM(s) Oral every 4 hours PRN Moderate Pain (4 - 6)  traMADol 50 milliGRAM(s) Oral every 4 hours PRN Severe Pain (7 - 10)    Physical exam: The left hip dressings are clean, dry and intact. No drainage or discharge. No erythema is noted. No blistering. No ecchymosis. The calf is supple nontender. Passive range of motion is acceptable to due postoperative pain. Sensation to light touch is grossly intact distally. Motor function distally is 5/5. No foot drop. 2+ dorsalis pedis pulse. Capillary refill is less than 2 seconds. No cyanosis.    Primary Orthopedic Assessment:  • s/p LEFT POSTERIOR total hip replacement    Secondary  Medical Assessment(s):   • PMH: A-Fib, HTN, PPM, Hypothyroid, Asthma, anemia    Plan:   • DVT prophylaxis as prescribed, including use of compression devices and ankle pumps  • Continue physical therapy  • Weightbearing as tolerated of the left lower extremity with assistance of a walker  • Incentive spirometry encouraged  • Pain control as clinically indicated  • Posterior hip precautions   • Medical care per Hospitalist  • Discharge planning – anticipated discharge is Home today when cleared by PT and Medicine

## 2025-01-04 NOTE — PROVIDER CONTACT NOTE (OTHER) - ASSESSMENT
BP 95/52  HR 89
pt in bed resting, IVF running. denies pain at this time. states she wants to "take a nap" before dinner.

## 2025-01-04 NOTE — PROVIDER CONTACT NOTE (OTHER) - SITUATION
Attended joint education session on 12/27/2024 via online method with opportunity to ask questions. Contact information for follow up questions given.
pt is s/p 500cc bolus over 2 hr for symptomatic hypotension when working with PT today.
patient HR elevated to 150s non sustained and patient participated with pt and afterwards, c/o dizziness, elevated K levels

## 2025-01-04 NOTE — PROGRESS NOTE ADULT - SUBJECTIVE AND OBJECTIVE BOX
Patient seen and examined . S/p L KALPANA  , POD # 2. AAOX3 , pain well controlled , denies n/v , voiding ,   episode of dizziness , feeling likely will passed out after physical therapy , noted hypotensive . No sob/chest pain     CC : L hip chronic pain , postop pain well controlled , episode of dizziness after physical therapy         MEDICATIONS  (STANDING):  acetaminophen     Tablet .. 975 milliGRAM(s) Oral every 8 hours  apixaban 2.5 milliGRAM(s) Oral two times a day  aspirin enteric coated 81 milliGRAM(s) Oral daily  gabapentin 300 milliGRAM(s) Oral at bedtime  ketorolac   Injectable 15 milliGRAM(s) IV Push once  levothyroxine 50 MICROGram(s) Oral daily  metoprolol tartrate 50 milliGRAM(s) Oral two times a day  pantoprazole    Tablet 40 milliGRAM(s) Oral before breakfast  polyethylene glycol 3350 17 Gram(s) Oral at bedtime  senna 2 Tablet(s) Oral at bedtime  sodium chloride 0.9% Bolus 500 milliLiter(s) IV Bolus once  sodium chloride 0.9%. 1000 milliLiter(s) (50 mL/Hr) IV Continuous <Continuous>  spironolactone 25 milliGRAM(s) Oral daily    MEDICATIONS  (PRN):  aluminum hydroxide/magnesium hydroxide/simethicone Suspension 30 milliLiter(s) Oral four times a day PRN Indigestion  bisacodyl Suppository 10 milliGRAM(s) Rectal once PRN Constipation  magnesium hydroxide Suspension 30 milliLiter(s) Oral daily PRN Constipation  ondansetron Injectable 4 milliGRAM(s) IV Push every 6 hours PRN Nausea and/or Vomiting  traMADol 25 milliGRAM(s) Oral every 4 hours PRN Moderate Pain (4 - 6)  traMADol 50 milliGRAM(s) Oral every 4 hours PRN Severe Pain (7 - 10)      LABS:                          8.6    11.41 )-----------( 116      ( 03 Jan 2025 19:26 )             25.9     01-03    130[L]  |  96  |  19.4  ----------------------------<  139[H]  4.8   |  19.0[L]  |  1.23    Ca    8.4      03 Jan 2025 10:11  Phos  3.5     01-03  Mg     1.4     01-03    A1C with Estimated Average Glucose (12.13.24 @ 12:55)    A1C with Estimated Average Glucose Result: 5.2 %   Estimated Average Glucose: 103 mg/dL          RADIOLOGY & ADDITIONAL TESTS:    < from: Xray Hip w/ Pelvis 2 or 3 Views, Left (01.02.25 @ 14:25) >    ACC: [id number] EXAM:  XR HIP WITH PELV 2-3V LT   ORDERED BY: MALLIKA JONES     PROCEDURE DATE:  01/02/2025          INTERPRETATION:  Pelvis and left hip    HISTORY: Postop    COMPARISON: 10/14/2023    Frontal view of the pelvis shows no evidence of fracture nor destructive   change of the intrinsic bones of the pelvis.    2 views of the left hip show total hip replacement.    IMPRESSION: Postoperative changes.      Thank you for this referral.    --- End of Report ---      < end of copied text >        REVIEW OF SYSTEMS:    As above , all other systems are reviewed and are negative .     I&O's Summary    03 Jan 2025 07:01  -  04 Jan 2025 07:00  --------------------------------------------------------  IN: 880 mL / OUT: 0 mL / NET: 880 mL          Vital Signs Last 24 Hrs  T(C): 36.7 (04 Jan 2025 09:05), Max: 36.9 (03 Jan 2025 21:05)  T(F): 98.1 (04 Jan 2025 09:05), Max: 98.5 (03 Jan 2025 21:05)  HR: 78 (04 Jan 2025 09:05) (78 - 102)  BP: 92/54 (04 Jan 2025 14:07) (80/49 - 129/69)  BP(mean): --  RR: 18 (04 Jan 2025 09:05) (18 - 19)  SpO2: 98% (04 Jan 2025 09:05) (95% - 98%)    Parameters below as of 04 Jan 2025 09:05  Patient On (Oxygen Delivery Method): room air      PHYSICAL EXAM:    GENERAL: NAD, well-groomed, well-developed  HEAD:  Atraumatic, Normocephalic  EYES: EOMI, PERRLA, conjunctiva and sclera clear  NECK: Supple, No JVD, Normal thyroid  NERVOUS SYSTEM:  Alert & Oriented X3, no focal deficit  CHEST/LUNG: CTA b/l ,  no  rales, rhonchi, wheezing, or rubs  HEART: irregularly IRREGULAR ,   No murmurs, rubs, or gallops  ABDOMEN: Soft, Nontender, Nondistended; Bowel sounds present  EXTREMITIES:  2+ Peripheral Pulses, No clubbing, cyanosis, or edema,   L hip with dry and clean dressing +   LYMPH: No lymphadenopathy noted  SKIN: No rashes or lesions

## 2025-01-05 LAB
ANION GAP SERPL CALC-SCNC: 10 MMOL/L — SIGNIFICANT CHANGE UP (ref 5–17)
ANION GAP SERPL CALC-SCNC: 9 MMOL/L — SIGNIFICANT CHANGE UP (ref 5–17)
BUN SERPL-MCNC: 25.2 MG/DL — HIGH (ref 8–20)
BUN SERPL-MCNC: 28.3 MG/DL — HIGH (ref 8–20)
CALCIUM SERPL-MCNC: 7.8 MG/DL — LOW (ref 8.4–10.5)
CALCIUM SERPL-MCNC: 7.9 MG/DL — LOW (ref 8.4–10.5)
CHLORIDE SERPL-SCNC: 91 MMOL/L — LOW (ref 96–108)
CHLORIDE SERPL-SCNC: 94 MMOL/L — LOW (ref 96–108)
CO2 SERPL-SCNC: 21 MMOL/L — LOW (ref 22–29)
CO2 SERPL-SCNC: 22 MMOL/L — SIGNIFICANT CHANGE UP (ref 22–29)
CREAT SERPL-MCNC: 1.09 MG/DL — SIGNIFICANT CHANGE UP (ref 0.5–1.3)
CREAT SERPL-MCNC: 1.19 MG/DL — SIGNIFICANT CHANGE UP (ref 0.5–1.3)
EGFR: 42 ML/MIN/1.73M2 — LOW
EGFR: 47 ML/MIN/1.73M2 — LOW
GLUCOSE SERPL-MCNC: 104 MG/DL — HIGH (ref 70–99)
GLUCOSE SERPL-MCNC: 120 MG/DL — HIGH (ref 70–99)
HCT VFR BLD CALC: 24.9 % — LOW (ref 34.5–45)
HGB BLD-MCNC: 8.4 G/DL — LOW (ref 11.5–15.5)
MCHC RBC-ENTMCNC: 32.4 PG — SIGNIFICANT CHANGE UP (ref 27–34)
MCHC RBC-ENTMCNC: 33.7 G/DL — SIGNIFICANT CHANGE UP (ref 32–36)
MCV RBC AUTO: 96.1 FL — SIGNIFICANT CHANGE UP (ref 80–100)
OSMOLALITY UR: 348 MOSM/KG — SIGNIFICANT CHANGE UP (ref 300–1000)
PLATELET # BLD AUTO: 109 K/UL — LOW (ref 150–400)
POTASSIUM SERPL-MCNC: 4.9 MMOL/L — SIGNIFICANT CHANGE UP (ref 3.5–5.3)
POTASSIUM SERPL-MCNC: 5.5 MMOL/L — HIGH (ref 3.5–5.3)
POTASSIUM SERPL-SCNC: 4.9 MMOL/L — SIGNIFICANT CHANGE UP (ref 3.5–5.3)
POTASSIUM SERPL-SCNC: 5.5 MMOL/L — HIGH (ref 3.5–5.3)
POTASSIUM UR-SCNC: 29 MMOL/L — SIGNIFICANT CHANGE UP
RBC # BLD: 2.59 M/UL — LOW (ref 3.8–5.2)
RBC # FLD: 14.3 % — SIGNIFICANT CHANGE UP (ref 10.3–14.5)
SODIUM SERPL-SCNC: 121 MMOL/L — LOW (ref 135–145)
SODIUM SERPL-SCNC: 126 MMOL/L — LOW (ref 135–145)
SODIUM UR-SCNC: 39 MMOL/L — SIGNIFICANT CHANGE UP
WBC # BLD: 8.76 K/UL — SIGNIFICANT CHANGE UP (ref 3.8–10.5)
WBC # FLD AUTO: 8.76 K/UL — SIGNIFICANT CHANGE UP (ref 3.8–10.5)

## 2025-01-05 PROCEDURE — 99221 1ST HOSP IP/OBS SF/LOW 40: CPT

## 2025-01-05 PROCEDURE — 99233 SBSQ HOSP IP/OBS HIGH 50: CPT

## 2025-01-05 RX ORDER — METOPROLOL TARTRATE 50 MG
5 TABLET ORAL EVERY 6 HOURS
Refills: 0 | Status: DISCONTINUED | OUTPATIENT
Start: 2025-01-05 | End: 2025-01-07

## 2025-01-05 RX ORDER — METOPROLOL TARTRATE 50 MG
25 TABLET ORAL
Refills: 0 | Status: DISCONTINUED | OUTPATIENT
Start: 2025-01-05 | End: 2025-01-06

## 2025-01-05 RX ORDER — SODIUM ZIRCONIUM CYCLOSILICATE 10 G/10G
5 POWDER, FOR SUSPENSION ORAL ONCE
Refills: 0 | Status: COMPLETED | OUTPATIENT
Start: 2025-01-05 | End: 2025-01-05

## 2025-01-05 RX ORDER — SODIUM CHLORIDE 9 MG/ML
1 INJECTION, SOLUTION INTRAMUSCULAR; INTRAVENOUS; SUBCUTANEOUS THREE TIMES A DAY
Refills: 0 | Status: DISCONTINUED | OUTPATIENT
Start: 2025-01-05 | End: 2025-01-06

## 2025-01-05 RX ORDER — SODIUM CHLORIDE 5 % 5 %
1000 INTRAVENOUS SOLUTION INTRAVENOUS
Refills: 0 | Status: COMPLETED | OUTPATIENT
Start: 2025-01-05 | End: 2025-01-05

## 2025-01-05 RX ADMIN — ACETAMINOPHEN 975 MILLIGRAM(S): 80 SOLUTION/ DROPS ORAL at 05:32

## 2025-01-05 RX ADMIN — GABAPENTIN 300 MILLIGRAM(S): 300 CAPSULE ORAL at 22:43

## 2025-01-05 RX ADMIN — LEVOTHYROXINE SODIUM 50 MICROGRAM(S): 175 TABLET ORAL at 05:32

## 2025-01-05 RX ADMIN — SODIUM CHLORIDE 1 GRAM(S): 9 INJECTION, SOLUTION INTRAMUSCULAR; INTRAVENOUS; SUBCUTANEOUS at 22:43

## 2025-01-05 RX ADMIN — Medication 81 MILLIGRAM(S): at 11:39

## 2025-01-05 RX ADMIN — Medication 50 MILLILITER(S): at 10:25

## 2025-01-05 RX ADMIN — ACETAMINOPHEN 975 MILLIGRAM(S): 80 SOLUTION/ DROPS ORAL at 22:42

## 2025-01-05 RX ADMIN — Medication 17 GRAM(S): at 17:38

## 2025-01-05 RX ADMIN — SODIUM ZIRCONIUM CYCLOSILICATE 5 GRAM(S): 10 POWDER, FOR SUSPENSION ORAL at 19:32

## 2025-01-05 RX ADMIN — Medication 30 MILLILITER(S): at 11:39

## 2025-01-05 RX ADMIN — SODIUM CHLORIDE 1 GRAM(S): 9 INJECTION, SOLUTION INTRAMUSCULAR; INTRAVENOUS; SUBCUTANEOUS at 05:32

## 2025-01-05 RX ADMIN — Medication 25 MILLIGRAM(S): at 17:38

## 2025-01-05 RX ADMIN — SODIUM CHLORIDE 1 GRAM(S): 9 INJECTION, SOLUTION INTRAMUSCULAR; INTRAVENOUS; SUBCUTANEOUS at 14:21

## 2025-01-05 RX ADMIN — ACETAMINOPHEN 975 MILLIGRAM(S): 80 SOLUTION/ DROPS ORAL at 23:00

## 2025-01-05 RX ADMIN — APIXABAN 2.5 MILLIGRAM(S): 5 TABLET, FILM COATED ORAL at 17:38

## 2025-01-05 RX ADMIN — ACETAMINOPHEN 975 MILLIGRAM(S): 80 SOLUTION/ DROPS ORAL at 15:20

## 2025-01-05 RX ADMIN — APIXABAN 2.5 MILLIGRAM(S): 5 TABLET, FILM COATED ORAL at 05:32

## 2025-01-05 RX ADMIN — Medication 25 MILLIGRAM(S): at 09:20

## 2025-01-05 RX ADMIN — ACETAMINOPHEN 975 MILLIGRAM(S): 80 SOLUTION/ DROPS ORAL at 14:20

## 2025-01-05 RX ADMIN — PANTOPRAZOLE 40 MILLIGRAM(S): 40 TABLET, DELAYED RELEASE ORAL at 05:32

## 2025-01-05 NOTE — CONSULT NOTE ADULT - SUBJECTIVE AND OBJECTIVE BOX
History of present illness: Patient is a 89-year-old female with past medical history significant for hypertension, A-fib, hypothyroidism who had ORIF done in 2023 post fall, continues to have hip pain presented for elective KALPANA which was performed on 1/2/2024.  Nephrology consulted for hyponatremia.  Serum sodium today of 121 mmol/L.    Patient has history of hyponatremia and is on salt tab 1 g 3 times daily.    Review of systems: All systems were reviewed in detail, pertinent positive and negative have been mentioned above, otherwise negative.  Past medical and surgical history: A-fib, asthma, GERD, CHF, HLD, hypertension, hypothyroidism, hyponatremia, PPM placement, laminectomy, hysterectomy, appendectomy, back surgery, tonsillectomy.  Allergies: Bactrim, Robaxin, Demerol, sulfa drugs, codeine.  Home medications:  ·  sodium chloride 1 g oral tablet: Last Dose Taken:  , 1 tab(s) orally 3 times a day  ·  bisacodyl 5 mg oral delayed release tablet: Last Dose Taken:  , 1 tab(s) orally every 12 hours As needed Constipation  ·  gabapentin 300 mg oral capsule: Last Dose Taken:  , 1 cap(s) orally once a day (at bedtime)  ·  MiraLax oral powder for reconstitution: Last Dose Taken:  , 17 gram(s) orally once a day, As Needed for constipation   ·  acetaminophen 325 mg oral tablet: Last Dose Taken:  , 2 tab(s) orally every 6 hours, As Needed for mild to moderate pain  ·  apixaban 2.5 mg oral tablet: Last Dose Taken:  , 1 tab(s) orally 2 times a day  ·  multivitamin daily:   ·  Multiple Vitamins oral capsule: Last Dose Taken:  , 1 cap(s) orally once a day  ·  Calcium 600+D 600 mg-5 mcg (200 intl units) oral tablet: Last Dose Taken:  , 1 tab(s) orally once a day  ·  Vitamin C 500 mg oral tablet: Last Dose Taken:  , 1 tab(s) orally once a day  ·  spironolactone 25 mg oral tablet: 1 tab(s) orally once a day  ·  levothyroxine 50 mcg (0.05 mg) oral tablet: Last Dose Taken:  , 1 tab(s) orally once a day  ·  metoprolol tartrate 50 mg oral tablet: Last Dose Taken:  , 1 tab(s) orally 2 times a day  Family history: No pertinent family history of renal disease or electrolyte disorder in first degree relatives.  Social history: Denies tobacco, alcohol, drug abuse.     Physical Exam:  Gen: Elderly female, no acute distress  MS: alert, conversing normally  Eyes: EOMI, no icterus  HENT: NCAT, MMM  CV: rhythm reg reg, rate normal, no m/g/r, no LE edema  Chest: CTAB, no w/r/r,  Abd: soft, NT, ND  MSK: Left hip dressing CDI  Skin: dry, warm, no rash or jaundice History of present illness: Patient is a 89-year-old female with past medical history significant for hypertension, A-fib, hypothyroidism who had ORIF done in 2023 post fall, continues to have hip pain presented for elective KALPANA which was performed on 1/2/2024.  Nephrology consulted for hyponatremia.  Serum sodium today of 121 mmol/L.Family at bedside. Patient has history of hyponatremia and is on salt tab 1 g 3 times daily which was only resumed today.    Review of systems: All systems were reviewed in detail, pertinent positive and negative have been mentioned above, otherwise negative.  Past medical and surgical history: A-fib, asthma, GERD, CHF, HLD, hypertension, hypothyroidism, hyponatremia, PPM placement, laminectomy, hysterectomy, appendectomy, back surgery, tonsillectomy.  Allergies: Bactrim, Robaxin, Demerol, sulfa drugs, codeine.  Home medications:  ·  sodium chloride 1 g oral tablet: Last Dose Taken:  , 1 tab(s) orally 3 times a day  ·  bisacodyl 5 mg oral delayed release tablet: Last Dose Taken:  , 1 tab(s) orally every 12 hours As needed Constipation  ·  gabapentin 300 mg oral capsule: Last Dose Taken:  , 1 cap(s) orally once a day (at bedtime)  ·  MiraLax oral powder for reconstitution: Last Dose Taken:  , 17 gram(s) orally once a day, As Needed for constipation   ·  acetaminophen 325 mg oral tablet: Last Dose Taken:  , 2 tab(s) orally every 6 hours, As Needed for mild to moderate pain  ·  apixaban 2.5 mg oral tablet: Last Dose Taken:  , 1 tab(s) orally 2 times a day  ·  multivitamin daily:   ·  Multiple Vitamins oral capsule: Last Dose Taken:  , 1 cap(s) orally once a day  ·  Calcium 600+D 600 mg-5 mcg (200 intl units) oral tablet: Last Dose Taken:  , 1 tab(s) orally once a day  ·  Vitamin C 500 mg oral tablet: Last Dose Taken:  , 1 tab(s) orally once a day  ·  spironolactone 25 mg oral tablet: 1 tab(s) orally once a day  ·  levothyroxine 50 mcg (0.05 mg) oral tablet: Last Dose Taken:  , 1 tab(s) orally once a day  ·  metoprolol tartrate 50 mg oral tablet: Last Dose Taken:  , 1 tab(s) orally 2 times a day  Family history: No pertinent family history of renal disease or electrolyte disorder in first degree relatives.  Social history: Denies tobacco, alcohol, drug abuse.     Vital Signs Last 24 Hrs  T(C): 36.8 (05 Jan 2025 08:31), Max: 36.8 (04 Jan 2025 16:42)  T(F): 98.3 (05 Jan 2025 08:31), Max: 98.3 (04 Jan 2025 16:42)  HR: 96 (05 Jan 2025 08:31) (64 - 108)  BP: 136/79 (05 Jan 2025 08:31) (80/49 - 155/82)  BP(mean): --  RR: 18 (05 Jan 2025 08:31) (18 - 18)  SpO2: 94% (05 Jan 2025 08:31) (94% - 98%)    Parameters below as of 05 Jan 2025 08:31  Patient On (Oxygen Delivery Method): room air    Physical Exam:  Gen: Elderly female, no acute distress  MS: alert, conversing normally  Eyes: EOMI, no icterus  HENT: NCAT, MMM  CV: rhythm reg reg, rate normal, no m/g/r, no LE edema  Chest: CTAB, no w/r/r,  Abd: soft, NT, ND  MSK: Left hip dressing CDI  Skin: dry, warm, no rash or jaundice    01-05    121[L]  |  91[L]  |  28.3[H]  ----------------------------<  104[H]  4.9   |  21.0[L]  |  1.19    Ca    7.8[L]      05 Jan 2025 08:20  Mg     3.3     01-04                          8.4    8.76  )-----------( 109      ( 05 Jan 2025 08:20 )             24.9

## 2025-01-05 NOTE — CONSULT NOTE ADULT - ASSESSMENT
89 YOF hypertension, hyponatremia, hypothyroidism and admitted for elective total hip arthroplasty done on 1/2/2024.  Nephrology consulted for hyponatremia.    Hyponatremia, Acute on chronic:  ·	History of hyponatremia, serum sodium 132 on admission, now down to 121  ·	TSH within normal limit  ·	Will check urine sodium, urine osmolality, serum osmolality  ·	Recommend to place on free fluid restriction of less than 1.2 L/day  ·	Agree with hypertonic saline, patient started on 2% normal saline at 50 cc for 6 hours  ·	Continue with home dose of salt tablets 1 g 3 times daily  ·	Pain control  ·	Follow-up on BMP every 4-6 hours, goal serum sodium of closer to 127 tomorrow morning.    S/p elective total hip arthroplasty on 1/2/2024, management per primary team 89 YOF hypertension, hyponatremia, hypothyroidism and admitted for elective total hip arthroplasty done on 1/2/2024.  Nephrology consulted for hyponatremia.    Hyponatremia, Acute on chronic:  ·	History of hyponatremia, serum sodium 132 on admission, now down to 121  ·	TSH within normal limit  ·	Will check urine sodium, urine osmolality, serum osmolality  ·	Recommend to place on free fluid restriction of less than 1.2 L/day  ·	Agree with hypertonic saline, patient started on 2% normal saline at 50 cc for 6 hours  ·	Continue with home dose of salt tablets 1 g 3 times daily  ·	Pain control  ·	Follow-up on BMP every 4-6 hours, goal serum sodium of closer to 127 tomorrow morning.    S/p elective total hip arthroplasty on 1/2/2024, management per primary team    D/w patient, family and primary team

## 2025-01-05 NOTE — PROGRESS NOTE ADULT - ASSESSMENT
89 yo with Hx HTN afib s/p pacemaker, anemia,  HTN, Hypothyroidism, anxiety and depression, she has left hip pain from a fall 2023- completed ORIF then as well, Attends chiropractor initially 3x/week but now goes once every other week. Takes extra strength tylenol with minimal relief, she comes in here for elective left conversion total hip arthroplasty on 1/2/2024 s/p procedure.       Plan:     Left hip pain s/p left conversion total hip arthroplasty post op day 03:   PT/OT/pain mgmt  DVT prophylaxis- as per ortho  Abx as per SCIP- given   Incentive spirometry  Prophylaxis of opioid  induced constipation.  Wound care as per ortho     Hyponatremia, chronic - as per EMR and doctors note from 2023- patient with hyponatremia down to 121 -   likely due to SIADH - treated at that time and recommendations was to continue Na tabs . Patient not sure if she was taking or not.   Will call family to get more info .   Na 132--->130---> 124 , this am 124 . Yesterday patient with episode of dizziness and hypotensive was given bolus of 500 ml NS ,   and continued on 50 ml of NS / HR - 1000 ML .   Will restrict fluid intake to 1200 , 2% NS 50 ml x6 hrs - ordered , Na tabs 1 gm TID - ordered yesterday - continue .   Serum osm/ Urine osm / Na will not be be beneficial at this time as patient was on NS  and now on 2% NS.   Renal eval recommended .     Hyperkalemia- resolved     Acute blood loss anemia due to procedure in setting of chronic anemia: EBL- 100 ml , Hg yesterday noted - 7.5 , s/p 1 U PRBC,   repeated Hg yesterday 8.8 - will repeat STAT H/H , transfuse if Hg < 8      Episode of dizziness , hypotensive - SBP - 80's - likely due to hypovolemia ,   bolus 500 ml over  2 hrs ordered , mild hydration 50 ml till am due to poor oral fluid intake- given   , hold Spironolactone       chronic AFib - cardiac monitor noted with RVR -120'S - this am in 90's -   continue Metoprolol 25 mg BID , lOPRESSOR 5 MG IVP q6HRS FOR hr> 120 ordered   - continue  eliquis  -cont metoprolol home dose with parameters     Hypomagnesemia - will supplement     HFpEF -  HTN   -cont metoprolol  -hold spironolactone 25 mg once a day due to hyponatremia     Hypothyroid   -cont synthroid. Will check TSH in am      Not stable to for discharge today.    D/W ortho PA / NURSE - AWARE OF PLAN .   Will follow along with you

## 2025-01-05 NOTE — PROGRESS NOTE ADULT - SUBJECTIVE AND OBJECTIVE BOX
OFELIA GUNDERSONMARIA ELENA    [id number]    History: Patient is status post left posterior total hip arthroplasty, POD #3. Patient is doing well. The patient's pain is controlled using the prescribed pain medications. The patient is participating in physical therapy. Denies nausea, vomiting, chest pain, shortness of breath, abdominal pain or fever. No new complaints.             Vital Signs Last 24 Hrs  T(C): 36.8 (05 Jan 2025 08:31), Max: 36.8 (04 Jan 2025 16:42)  T(F): 98.3 (05 Jan 2025 08:31), Max: 98.3 (04 Jan 2025 16:42)  HR: 96 (05 Jan 2025 08:31) (64 - 108)  BP: 136/79 (05 Jan 2025 08:31) (80/49 - 155/82)  BP(mean): --  RR: 18 (05 Jan 2025 08:31) (18 - 18)  SpO2: 94% (05 Jan 2025 08:31) (94% - 98%)    Parameters below as of 05 Jan 2025 08:31  Patient On (Oxygen Delivery Method): room air                        8.4    8.76  )-----------( x        ( 05 Jan 2025 08:20 )             24.9     Physical exam: The left hip dressings remain clean, dry and intact. No drainage or discharge. No erythema is noted. No blistering. No ecchymosis. The calf is supple nontender. Passive range of motion is acceptable to due postoperative pain. Sensation to light touch is grossly intact distally. Motor function distally is 5/5. No foot drop. 2+ dorsalis pedis pulse. Capillary refill is less than 2 seconds. No cyanosis.    Primary Orthopedic Assessment:  • s/p LEFT POSTERIOR total hip replacement    Secondary  Medical Assessment(s):   • PMH: A-Fib, HTN, PPM, Hypothyroid, Asthma, anemia    Plan:   • DVT prophylaxis as prescribed, including use of compression devices and ankle pumps  • Continue physical therapy  • Weightbearing as tolerated of the left lower extremity with assistance of a walker  • Incentive spirometry encouraged  • Pain control as clinically indicated  • Posterior hip precautions   • Medical care per Hospitalist  • Discharge planning – anticipated discharge is Home today when cleared by PT and Medicine

## 2025-01-05 NOTE — PROGRESS NOTE ADULT - SUBJECTIVE AND OBJECTIVE BOX
Patient seen and examined , s/p left conversion total hip arthroplasty post op day 03. Sitting comfortable on the chair , AAOX2 to self and place , not to year ,   pain well controlled , voiding , not sure when she had last BM     CC : L hip pain well controlled post op     MEDICATIONS  (STANDING):  acetaminophen     Tablet .. 975 milliGRAM(s) Oral every 8 hours  apixaban 2.5 milliGRAM(s) Oral two times a day  aspirin enteric coated 81 milliGRAM(s) Oral daily  gabapentin 300 milliGRAM(s) Oral at bedtime  ketorolac   Injectable 15 milliGRAM(s) IV Push once  levothyroxine 50 MICROGram(s) Oral daily  metoprolol tartrate 25 milliGRAM(s) Oral two times a day  pantoprazole    Tablet 40 milliGRAM(s) Oral before breakfast  polyethylene glycol 3350 17 Gram(s) Oral at bedtime  senna 2 Tablet(s) Oral at bedtime  sodium chloride 1 Gram(s) Oral three times a day  sodium chloride 2% . 1000 milliLiter(s) (50 mL/Hr) IV Continuous <Continuous>    MEDICATIONS  (PRN):  aluminum hydroxide/magnesium hydroxide/simethicone Suspension 30 milliLiter(s) Oral four times a day PRN Indigestion  bisacodyl Suppository 10 milliGRAM(s) Rectal once PRN Constipation  magnesium hydroxide Suspension 30 milliLiter(s) Oral daily PRN Constipation  metoprolol tartrate Injectable 5 milliGRAM(s) IV Push every 6 hours PRN FOR HR> 120  ondansetron Injectable 4 milliGRAM(s) IV Push every 6 hours PRN Nausea and/or Vomiting  traMADol 25 milliGRAM(s) Oral every 4 hours PRN Moderate Pain (4 - 6)  traMADol 50 milliGRAM(s) Oral every 4 hours PRN Severe Pain (7 - 10)      LABS:                          8.4    8.76  )-----------( 109      ( 05 Jan 2025 08:20 )             24.9     01-05    121[L]  |  91[L]  |  28.3[H]  ----------------------------<  104[H]  4.9   |  21.0[L]  |  1.19    Ca    7.8[L]      05 Jan 2025 08:20  Mg     3.3     01-04    RADIOLOGY & ADDITIONAL TESTS:    < from: Xray Hip w/ Pelvis 2 or 3 Views, Left (01.02.25 @ 14:25) >    ACC: [id number] EXAM:  XR HIP WITH PELV 2-3V LT   ORDERED BY: MALLIKA JONES     PROCEDURE DATE:  01/02/2025          INTERPRETATION:  Pelvis and left hip    HISTORY: Postop    COMPARISON: 10/14/2023    Frontal view of the pelvis shows no evidence of fracture nor destructive   change of the intrinsic bones of the pelvis.    2 views of the left hip show total hip replacement.    IMPRESSION: Postoperative changes.      Thank you for this referral.    --- End of Report ---            REVIEW OF SYSTEMS:    As above , all other systems are reviewed and are negative .     I&O's Summary    04 Jan 2025 07:01  -  05 Jan 2025 07:00  --------------------------------------------------------  IN: 0 mL / OUT: 200 mL / NET: -200 mL          Vital Signs Last 24 Hrs  T(C): 36.8 (05 Jan 2025 08:31), Max: 36.8 (04 Jan 2025 16:42)  T(F): 98.3 (05 Jan 2025 08:31), Max: 98.3 (04 Jan 2025 16:42)  HR: 96 (05 Jan 2025 08:31) (64 - 108)  BP: 136/79 (05 Jan 2025 08:31) (80/49 - 155/82)  BP(mean): --  RR: 18 (05 Jan 2025 08:31) (18 - 18)  SpO2: 94% (05 Jan 2025 08:31) (94% - 98%)    Parameters below as of 05 Jan 2025 08:31  Patient On (Oxygen Delivery Method): room air      PHYSICAL EXAM:    GENERAL: NAD,   HEAD:  Atraumatic, Normocephalic  EYES: EOMI, PERRLA, conjunctiva and sclera clear  NECK: Supple, No JVD, Normal thyroid  NERVOUS SYSTEM:  Alert & Oriented X2 , to self and place not to year , no focal deficit  CHEST/LUNG: CTA b/l ,  no  rales, rhonchi, wheezing, or rubs  HEART: Regular rate and rhythm; No murmurs, rubs, or gallops  ABDOMEN: Soft, Nontender, Nondistended; Bowel sounds present  EXTREMITIES:  2+ Peripheral Pulses, No clubbing, cyanosis, or edema  LYMPH: No lymphadenopathy noted  SKIN: No rashes or lesions  L hiop with C/D/I dressing +

## 2025-01-06 LAB
ANION GAP SERPL CALC-SCNC: 10 MMOL/L — SIGNIFICANT CHANGE UP (ref 5–17)
ANION GAP SERPL CALC-SCNC: 9 MMOL/L — SIGNIFICANT CHANGE UP (ref 5–17)
BUN SERPL-MCNC: 20.7 MG/DL — HIGH (ref 8–20)
BUN SERPL-MCNC: 23.2 MG/DL — HIGH (ref 8–20)
CALCIUM SERPL-MCNC: 7.7 MG/DL — LOW (ref 8.4–10.5)
CALCIUM SERPL-MCNC: 7.8 MG/DL — LOW (ref 8.4–10.5)
CHLORIDE SERPL-SCNC: 97 MMOL/L — SIGNIFICANT CHANGE UP (ref 96–108)
CHLORIDE SERPL-SCNC: 99 MMOL/L — SIGNIFICANT CHANGE UP (ref 96–108)
CO2 SERPL-SCNC: 21 MMOL/L — LOW (ref 22–29)
CO2 SERPL-SCNC: 21 MMOL/L — LOW (ref 22–29)
CREAT SERPL-MCNC: 0.84 MG/DL — SIGNIFICANT CHANGE UP (ref 0.5–1.3)
CREAT SERPL-MCNC: 0.91 MG/DL — SIGNIFICANT CHANGE UP (ref 0.5–1.3)
EGFR: 58 ML/MIN/1.73M2 — LOW
EGFR: 64 ML/MIN/1.73M2 — SIGNIFICANT CHANGE UP
GLUCOSE SERPL-MCNC: 117 MG/DL — HIGH (ref 70–99)
GLUCOSE SERPL-MCNC: 97 MG/DL — SIGNIFICANT CHANGE UP (ref 70–99)
HCT VFR BLD CALC: 21.3 % — LOW (ref 34.5–45)
HCT VFR BLD CALC: 22.7 % — LOW (ref 34.5–45)
HGB BLD-MCNC: 7.1 G/DL — LOW (ref 11.5–15.5)
HGB BLD-MCNC: 7.6 G/DL — LOW (ref 11.5–15.5)
MAGNESIUM SERPL-MCNC: 2.5 MG/DL — SIGNIFICANT CHANGE UP (ref 1.6–2.6)
MCHC RBC-ENTMCNC: 32.5 PG — SIGNIFICANT CHANGE UP (ref 27–34)
MCHC RBC-ENTMCNC: 32.7 PG — SIGNIFICANT CHANGE UP (ref 27–34)
MCHC RBC-ENTMCNC: 33.3 G/DL — SIGNIFICANT CHANGE UP (ref 32–36)
MCHC RBC-ENTMCNC: 33.5 G/DL — SIGNIFICANT CHANGE UP (ref 32–36)
MCV RBC AUTO: 97 FL — SIGNIFICANT CHANGE UP (ref 80–100)
MCV RBC AUTO: 98.2 FL — SIGNIFICANT CHANGE UP (ref 80–100)
PLATELET # BLD AUTO: 133 K/UL — LOW (ref 150–400)
PLATELET # BLD AUTO: 136 K/UL — LOW (ref 150–400)
POTASSIUM SERPL-MCNC: 4.6 MMOL/L — SIGNIFICANT CHANGE UP (ref 3.5–5.3)
POTASSIUM SERPL-MCNC: 5 MMOL/L — SIGNIFICANT CHANGE UP (ref 3.5–5.3)
POTASSIUM SERPL-SCNC: 4.6 MMOL/L — SIGNIFICANT CHANGE UP (ref 3.5–5.3)
POTASSIUM SERPL-SCNC: 5 MMOL/L — SIGNIFICANT CHANGE UP (ref 3.5–5.3)
RBC # BLD: 2.17 M/UL — LOW (ref 3.8–5.2)
RBC # BLD: 2.34 M/UL — LOW (ref 3.8–5.2)
RBC # FLD: 14 % — SIGNIFICANT CHANGE UP (ref 10.3–14.5)
RBC # FLD: 14.3 % — SIGNIFICANT CHANGE UP (ref 10.3–14.5)
SODIUM SERPL-SCNC: 127 MMOL/L — LOW (ref 135–145)
SODIUM SERPL-SCNC: 129 MMOL/L — LOW (ref 135–145)
TSH SERPL-MCNC: 0.8 UIU/ML — SIGNIFICANT CHANGE UP (ref 0.27–4.2)
WBC # BLD: 7.42 K/UL — SIGNIFICANT CHANGE UP (ref 3.8–10.5)
WBC # BLD: 8.33 K/UL — SIGNIFICANT CHANGE UP (ref 3.8–10.5)
WBC # FLD AUTO: 7.42 K/UL — SIGNIFICANT CHANGE UP (ref 3.8–10.5)
WBC # FLD AUTO: 8.33 K/UL — SIGNIFICANT CHANGE UP (ref 3.8–10.5)

## 2025-01-06 PROCEDURE — 99233 SBSQ HOSP IP/OBS HIGH 50: CPT

## 2025-01-06 RX ORDER — METOPROLOL TARTRATE 50 MG
25 TABLET ORAL
Refills: 0 | Status: DISCONTINUED | OUTPATIENT
Start: 2025-01-06 | End: 2025-01-07

## 2025-01-06 RX ORDER — SODIUM CHLORIDE 5 % 5 %
500 INTRAVENOUS SOLUTION INTRAVENOUS
Refills: 0 | Status: DISCONTINUED | OUTPATIENT
Start: 2025-01-06 | End: 2025-01-07

## 2025-01-06 RX ORDER — SODIUM CHLORIDE 9 MG/ML
2 INJECTION, SOLUTION INTRAMUSCULAR; INTRAVENOUS; SUBCUTANEOUS
Refills: 0 | Status: DISCONTINUED | OUTPATIENT
Start: 2025-01-07 | End: 2025-01-07

## 2025-01-06 RX ORDER — SODIUM CHLORIDE 9 MG/ML
1 INJECTION, SOLUTION INTRAMUSCULAR; INTRAVENOUS; SUBCUTANEOUS ONCE
Refills: 0 | Status: COMPLETED | OUTPATIENT
Start: 2025-01-06 | End: 2025-01-06

## 2025-01-06 RX ORDER — SODIUM CHLORIDE 9 MG/ML
2 INJECTION, SOLUTION INTRAMUSCULAR; INTRAVENOUS; SUBCUTANEOUS ONCE
Refills: 0 | Status: COMPLETED | OUTPATIENT
Start: 2025-01-06 | End: 2025-01-06

## 2025-01-06 RX ADMIN — Medication 50 MILLILITER(S): at 10:55

## 2025-01-06 RX ADMIN — SODIUM CHLORIDE 1 GRAM(S): 9 INJECTION, SOLUTION INTRAMUSCULAR; INTRAVENOUS; SUBCUTANEOUS at 06:01

## 2025-01-06 RX ADMIN — ACETAMINOPHEN 975 MILLIGRAM(S): 80 SOLUTION/ DROPS ORAL at 15:23

## 2025-01-06 RX ADMIN — ACETAMINOPHEN 975 MILLIGRAM(S): 80 SOLUTION/ DROPS ORAL at 06:00

## 2025-01-06 RX ADMIN — Medication 25 MILLIGRAM(S): at 18:53

## 2025-01-06 RX ADMIN — ACETAMINOPHEN 975 MILLIGRAM(S): 80 SOLUTION/ DROPS ORAL at 07:00

## 2025-01-06 RX ADMIN — Medication 17 GRAM(S): at 22:09

## 2025-01-06 RX ADMIN — Medication 81 MILLIGRAM(S): at 11:53

## 2025-01-06 RX ADMIN — SODIUM CHLORIDE 1 GRAM(S): 9 INJECTION, SOLUTION INTRAMUSCULAR; INTRAVENOUS; SUBCUTANEOUS at 14:23

## 2025-01-06 RX ADMIN — SODIUM CHLORIDE 2 GRAM(S): 9 INJECTION, SOLUTION INTRAMUSCULAR; INTRAVENOUS; SUBCUTANEOUS at 22:12

## 2025-01-06 RX ADMIN — ACETAMINOPHEN 975 MILLIGRAM(S): 80 SOLUTION/ DROPS ORAL at 14:23

## 2025-01-06 RX ADMIN — Medication 25 MILLIGRAM(S): at 06:00

## 2025-01-06 RX ADMIN — ACETAMINOPHEN 975 MILLIGRAM(S): 80 SOLUTION/ DROPS ORAL at 22:09

## 2025-01-06 RX ADMIN — APIXABAN 2.5 MILLIGRAM(S): 5 TABLET, FILM COATED ORAL at 06:01

## 2025-01-06 RX ADMIN — LEVOTHYROXINE SODIUM 50 MICROGRAM(S): 175 TABLET ORAL at 06:01

## 2025-01-06 RX ADMIN — APIXABAN 2.5 MILLIGRAM(S): 5 TABLET, FILM COATED ORAL at 18:54

## 2025-01-06 RX ADMIN — SODIUM CHLORIDE 1 GRAM(S): 9 INJECTION, SOLUTION INTRAMUSCULAR; INTRAVENOUS; SUBCUTANEOUS at 10:19

## 2025-01-06 RX ADMIN — SENNOSIDES 2 TABLET(S): 8.6 TABLET, FILM COATED ORAL at 22:08

## 2025-01-06 RX ADMIN — GABAPENTIN 300 MILLIGRAM(S): 300 CAPSULE ORAL at 22:09

## 2025-01-06 RX ADMIN — Medication 50 MILLILITER(S): at 11:53

## 2025-01-06 NOTE — PROGRESS NOTE ADULT - SUBJECTIVE AND OBJECTIVE BOX
OFELIA RODRIGUEZKACY    [id number]    History: Patient is status post left posterior total hip arthroplasty, POD#4. Patient is doing well. The patient's pain is controlled using the prescribed pain medications. The patient is participating in physical therapy. Denies nausea, vomiting, chest pain, shortness of breath, abdominal pain or fever. No new complaints.                            7.6    8.33  )-----------( 133      ( 06 Jan 2025 06:42 )             22.7       01-05    126[L]  |  94[L]  |  25.2[H]  ----------------------------<  120[H]  5.5[H]   |  22.0  |  1.09    Ca    7.9[L]      05 Jan 2025 16:23  Mg     3.3     01-04          Physical exam: The left hip dressing is clean, dry and intact. No drainage or discharge. No erythema is noted. No blistering. No ecchymosis. The calf is supple nontender. Sensation to light touch is grossly intact distally. +dorsi/plantarflexion/EHL/FHL. No foot drop. 2+ dorsalis pedis pulse. Capillary refill is less than 2 seconds. No cyanosis.    Primary Orthopedic Assessment:  • s/p LEFT POSTERIOR total hip replacement, POD#4        Plan:   * Nephro consulted for hyponatremia - f/u AM labs  • DVT prophylaxis as prescribed, including use of compression devices and ankle pumps  • Continue physical therapy  • Weightbearing as tolerated of the left lower extremity with assistance of a walker  • Incentive spirometry encouraged  • Pain control as clinically indicated  • Posterior hip precautions reviewed with patient  • Discharge planning – anticipated discharge is Home when cleared by PT and medicine

## 2025-01-06 NOTE — PROGRESS NOTE ADULT - ASSESSMENT
89 yo with Hx HTN afib s/p pacemaker, anemia,  HTN, Hypothyroidism, anxiety and depression, she has left hip pain from a fall 2023- completed ORIF then as well, Attends chiropractor initially 3x/week but now goes once every other week. Takes extra strength tylenol with minimal relief, she comes in here for elective left conversion total hip arthroplasty on 1/2/2024 s/p procedure.       Plan:     Left hip pain s/p left conversion total hip arthroplasty post op day 04:   PT/OT/pain mgmt  DVT prophylaxis- as per ortho  Abx as per SCIP- given   Incentive spirometry  Prophylaxis of opioid  induced constipation.  Wound care as per ortho     Hyponatremia, chronic - as per EMR and doctors note from 2023- patient with hyponatremia down to 121 -   likely due to SIADH - treated at that time and recommendations was to continue Na tabs . As per  patient takes 1 gm BID at home .  Na 132--->130---> 124---> 121 ( on 1/5/25 ) , 2% NS x3 hrs given , restarted on Na 1 gm TID , fluid restriction - 1.2 L .   Renal noted and appreciated . Patient confused this am .  Spoke to Dr Oneill - plan to give 2% NS 50 ml x6 hrs , repeat Na after,   goal Na least 130 , will continue Na tabs 1 gm tid     Hyperkalemia- resolved     Acute blood loss anemia due to procedure in setting of chronic anemia: EBL- 100 ml , Hg yesterday noted - 7.5 , s/p 1 U PRBC,   post ob , Hg today 7.4 , will repeat cbc at 17:00 , if Hg dropping will recommend 1 U PRBC         chronic AFib - cardiac monitor noted with RVR -120'S - this am in 90's -   will increase Metoprolol titrate to 25 mg TID with holding parameters , Lopressor 5 mg  IVP  Q 6 hr PRN  for HR > 120   - continue  eliquis  -cont metoprolol home dose with parameters     Hypomagnesemia - supplemented     HFpEF -  HTN   -cont metoprolol  -hold spironolactone 25 mg once a day due to hyponatremia     Hypothyroid   -cont synthroid. TSH - nl     Confusion - metabolic encephalopathy - likely due to hyponatremia ,   Na level least 130     Not stable to for discharge today.    Spoke to  ( Mr Doyen Garcia ) made aware  of plan     Poor oral intake / appetite - Ensure ordered     D/W ortho PA / NURSE - AWARE OF PLAN .   Will follow along with you

## 2025-01-06 NOTE — PROGRESS NOTE ADULT - SUBJECTIVE AND OBJECTIVE BOX
Patient seen and examined . Patient seen and examined , comfortable , awake  , oriented to self , confused , not sure where is she and why she is not home ,   doesn't remember that her  visited her yesterday , poor oral intake , voiding ,had BM yesterday     CC : L hip pain well controlled , confused       MEDICATIONS  (STANDING):  acetaminophen     Tablet .. 975 milliGRAM(s) Oral every 8 hours  apixaban 2.5 milliGRAM(s) Oral two times a day  aspirin enteric coated 81 milliGRAM(s) Oral daily  gabapentin 300 milliGRAM(s) Oral at bedtime  ketorolac   Injectable 15 milliGRAM(s) IV Push once  levothyroxine 50 MICROGram(s) Oral daily  metoprolol tartrate 25 milliGRAM(s) Oral two times a day  pantoprazole    Tablet 40 milliGRAM(s) Oral before breakfast  polyethylene glycol 3350 17 Gram(s) Oral at bedtime  senna 2 Tablet(s) Oral at bedtime  sodium chloride 1 Gram(s) Oral three times a day  sodium chloride 2% . 1000 milliLiter(s) (50 mL/Hr) IV Continuous <Continuous>  sodium chloride 2% . 500 milliLiter(s) (50 mL/Hr) IV Continuous <Continuous>    MEDICATIONS  (PRN):  aluminum hydroxide/magnesium hydroxide/simethicone Suspension 30 milliLiter(s) Oral four times a day PRN Indigestion  bisacodyl Suppository 10 milliGRAM(s) Rectal once PRN Constipation  magnesium hydroxide Suspension 30 milliLiter(s) Oral daily PRN Constipation  metoprolol tartrate Injectable 5 milliGRAM(s) IV Push every 6 hours PRN FOR HR> 120  ondansetron Injectable 4 milliGRAM(s) IV Push every 6 hours PRN Nausea and/or Vomiting  traMADol 25 milliGRAM(s) Oral every 4 hours PRN Moderate Pain (4 - 6)  traMADol 50 milliGRAM(s) Oral every 4 hours PRN Severe Pain (7 - 10)      LABS:                          7.6    8.33  )-----------( 133      ( 06 Jan 2025 06:42 )             22.7     01-06    127[L]  |  97  |  20.7[H]  ----------------------------<  97  4.6   |  21.0[L]  |  0.91    Ca    7.8[L]      06 Jan 2025 06:42  Mg     2.5     01-06    Thyroid Stimulating Hormone, Serum in AM (01.06.25 @ 06:42)    Thyroid Stimulating Hormone, Serum: 0.80 uIU/mL    Osmolality, Random Urine (01.05.25 @ 13:13)    Osmolality, Random Urine: 348 mosm/kg    Sodium, Random Urine (01.05.25 @ 13:13)    Sodium, Random Urine: 39: Reference Ranges have NOT been established for random urine analytes due  to variability in fluid intake and concentration. mmol/L          RADIOLOGY & ADDITIONAL TESTS:    < from: Xray Hip w/ Pelvis 2 or 3 Views, Left (01.02.25 @ 14:25) >    ACC: [id number] EXAM:  XR HIP WITH PELV 2-3V LT   ORDERED BY: MALLIKA JONES     PROCEDURE DATE:  01/02/2025          INTERPRETATION:  Pelvis and left hip    HISTORY: Postop    COMPARISON: 10/14/2023    Frontal view of the pelvis shows no evidence of fracture nor destructive   change of the intrinsic bones of the pelvis.    2 views of the left hip show total hip replacement.    IMPRESSION: Postoperative changes.      Thank you for this referral.    --- End of Report ---      < end of copied text >        REVIEW OF SYSTEMS:    As above , all other systems are reviewed and are negative .     I&O's Summary    05 Jan 2025 07:01  -  06 Jan 2025 07:00  --------------------------------------------------------  IN: 1400 mL / OUT: 950 mL / NET: 450 mL          Vital Signs Last 24 Hrs  T(C): 36.6 (06 Jan 2025 10:06), Max: 36.7 (05 Jan 2025 21:00)  T(F): 97.8 (06 Jan 2025 10:06), Max: 98.1 (05 Jan 2025 21:00)  HR: 73 (06 Jan 2025 10:06) (73 - 81)  BP: 117/73 (06 Jan 2025 10:06) (117/73 - 137/62)  BP(mean): --  RR: 14 (06 Jan 2025 10:06) (14 - 18)  SpO2: 99% (06 Jan 2025 10:06) (98% - 99%)    Parameters below as of 06 Jan 2025 10:06  Patient On (Oxygen Delivery Method): room air      PHYSICAL EXAM:    GENERAL: NAD, well-groomed, well-developed  HEAD:  Atraumatic, Normocephalic  EYES: EOMI, PERRLA, conjunctiva and sclera clear  NECK: Supple, No JVD, Normal thyroid  NERVOUS SYSTEM:  Alert & Oriented X3, no focal deficit  CHEST/LUNG: CTA b/l ,  no  rales, rhonchi, wheezing, or rubs  HEART: irregularly irregular.   No murmurs, rubs, or gallops  ABDOMEN: Soft, Nontender, Nondistended; Bowel sounds present  EXTREMITIES:  2+ Peripheral Pulses, No clubbing, cyanosis, or edema  LYMPH: No lymphadenopathy noted  SKIN: No rashes or lesions  L hip with dry and clean dressing +

## 2025-01-06 NOTE — PROGRESS NOTE ADULT - SUBJECTIVE AND OBJECTIVE BOX
[facility] DIVISION OF KIDNEY DISEASES AND HYPERTENSION -- PROGRESS NOTE  --------------------------------------------------------------------------------  Chief Complaint: Hyponatremia    24 hour events/subjective:        PAST HISTORY  --------------------------------------------------------------------------------  No significant changes to PMH, PSH, FHx, SHx, unless otherwise noted    ALLERGIES & MEDICATIONS  --------------------------------------------------------------------------------  Allergies    Robaxin (Other)  codeine (Unknown)  Demerol HCl (Nausea)  Bactrim (Other)  shellfish (Rash)  sulfa drugs (Other)    Intolerances      Standing Inpatient Medications  acetaminophen     Tablet .. 975 milliGRAM(s) Oral every 8 hours  apixaban 2.5 milliGRAM(s) Oral two times a day  aspirin enteric coated 81 milliGRAM(s) Oral daily  gabapentin 300 milliGRAM(s) Oral at bedtime  ketorolac   Injectable 15 milliGRAM(s) IV Push once  levothyroxine 50 MICROGram(s) Oral daily  metoprolol tartrate 25 milliGRAM(s) Oral two times a day  pantoprazole    Tablet 40 milliGRAM(s) Oral before breakfast  polyethylene glycol 3350 17 Gram(s) Oral at bedtime  senna 2 Tablet(s) Oral at bedtime  sodium chloride 1 Gram(s) Oral three times a day  sodium chloride 2% . 500 milliLiter(s) IV Continuous <Continuous>    PRN Inpatient Medications  aluminum hydroxide/magnesium hydroxide/simethicone Suspension 30 milliLiter(s) Oral four times a day PRN  bisacodyl Suppository 10 milliGRAM(s) Rectal once PRN  magnesium hydroxide Suspension 30 milliLiter(s) Oral daily PRN  metoprolol tartrate Injectable 5 milliGRAM(s) IV Push every 6 hours PRN  ondansetron Injectable 4 milliGRAM(s) IV Push every 6 hours PRN  traMADol 25 milliGRAM(s) Oral every 4 hours PRN  traMADol 50 milliGRAM(s) Oral every 4 hours PRN      REVIEW OF SYSTEMS  --------------------------------------------------------------------------------  Gen: No weight changes, fatigue, fevers/chills, weakness  Skin: No rashes  Head/Eyes/Ears/Mouth: No headache  Respiratory: No dyspnea, cough,  CV: No chest pain, orthopnea  GI: No abdominal pain, diarrhea, constipation, nausea, vomiting,  MSK: No joint pain  Neuro: No dizziness/lightheadedness, weakness  Heme: No bleeding  Psych: No significant nervousness, anxiety, stress, depression    All other systems were reviewed and are negative, except as noted.    VITALS/PHYSICAL EXAM  --------------------------------------------------------------------------------  T(C): 36.3 (01-06-25 @ 13:33), Max: 36.7 (01-05-25 @ 21:00)  HR: 82 (01-06-25 @ 13:33) (73 - 82)  BP: 106/64 (01-06-25 @ 13:33) (106/64 - 137/62)  RR: 16 (01-06-25 @ 13:33) (14 - 18)  SpO2: 96% (01-06-25 @ 13:33) (96% - 99%)  Wt(kg): --        01-05-25 @ 07:01  -  01-06-25 @ 07:00  --------------------------------------------------------  IN: 1400 mL / OUT: 950 mL / NET: 450 mL      Physical Exam:  	Gen: NAD, well-appearing  	HEENT: PERRL, supple neck,  	Pulm: CTA B/L  	CV: RRR, S1S2; no rub  	Abd: +BS, soft, nontender  	UE: Warm, intact strength; no asterixis  	LE: Warm, + edema  	Neuro: No focal deficits  	Psych: Normal affect and mood  	Skin: Warm, without rashes  	Vascular access:    LABS/STUDIES  --------------------------------------------------------------------------------              7.6    8.33  >-----------<  133      [01-06-25 @ 06:42]              22.7     127  |  97  |  20.7  ----------------------------<  97      [01-06-25 @ 06:42]  4.6   |  21.0  |  0.91        Ca     7.8     [01-06-25 @ 06:42]      Mg     2.5     [01-06-25 @ 06:42]            TSH 0.80      [01-06-25 @ 06:42]     [facility] DIVISION OF KIDNEY DISEASES AND HYPERTENSION -- PROGRESS NOTE  --------------------------------------------------------------------------------  Chief Complaint: Hyponatremia    24 hour events/subjective:  sodium levels downtrending  pt seen and examined; family at bedside  pt has no new complaint      PAST HISTORY  --------------------------------------------------------------------------------  No significant changes to PMH, PSH, FHx, SHx, unless otherwise noted    ALLERGIES & MEDICATIONS  --------------------------------------------------------------------------------  Allergies    Robaxin (Other)  codeine (Unknown)  Demerol HCl (Nausea)  Bactrim (Other)  shellfish (Rash)  sulfa drugs (Other)    Intolerances      Standing Inpatient Medications  acetaminophen     Tablet .. 975 milliGRAM(s) Oral every 8 hours  apixaban 2.5 milliGRAM(s) Oral two times a day  aspirin enteric coated 81 milliGRAM(s) Oral daily  gabapentin 300 milliGRAM(s) Oral at bedtime  ketorolac   Injectable 15 milliGRAM(s) IV Push once  levothyroxine 50 MICROGram(s) Oral daily  metoprolol tartrate 25 milliGRAM(s) Oral two times a day  pantoprazole    Tablet 40 milliGRAM(s) Oral before breakfast  polyethylene glycol 3350 17 Gram(s) Oral at bedtime  senna 2 Tablet(s) Oral at bedtime  sodium chloride 1 Gram(s) Oral three times a day  sodium chloride 2% . 500 milliLiter(s) IV Continuous <Continuous>    PRN Inpatient Medications  aluminum hydroxide/magnesium hydroxide/simethicone Suspension 30 milliLiter(s) Oral four times a day PRN  bisacodyl Suppository 10 milliGRAM(s) Rectal once PRN  magnesium hydroxide Suspension 30 milliLiter(s) Oral daily PRN  metoprolol tartrate Injectable 5 milliGRAM(s) IV Push every 6 hours PRN  ondansetron Injectable 4 milliGRAM(s) IV Push every 6 hours PRN  traMADol 25 milliGRAM(s) Oral every 4 hours PRN  traMADol 50 milliGRAM(s) Oral every 4 hours PRN      REVIEW OF SYSTEMS  --------------------------------------------------------------------------------  Gen: No weight changes, fatigue, fevers/chills, weakness  Skin: No rashes  Head/Eyes/Ears/Mouth: No headache  Respiratory: No dyspnea, cough,  CV: No chest pain, orthopnea  GI: No abdominal pain, diarrhea, constipation, nausea, vomiting,  MSK: No joint pain  Neuro: No dizziness/lightheadedness, weakness  Heme: No bleeding  Psych: No significant nervousness, anxiety, stress, depression    All other systems were reviewed and are negative, except as noted.    VITALS/PHYSICAL EXAM  --------------------------------------------------------------------------------  T(C): 36.3 (01-06-25 @ 13:33), Max: 36.7 (01-05-25 @ 21:00)  HR: 82 (01-06-25 @ 13:33) (73 - 82)  BP: 106/64 (01-06-25 @ 13:33) (106/64 - 137/62)  RR: 16 (01-06-25 @ 13:33) (14 - 18)  SpO2: 96% (01-06-25 @ 13:33) (96% - 99%)  Wt(kg): --        01-05-25 @ 07:01  -  01-06-25 @ 07:00  --------------------------------------------------------  IN: 1400 mL / OUT: 950 mL / NET: 450 mL      Physical Exam:  	Gen: NAD, well-appearing  	HEENT: PERRL, supple neck,  	Pulm: CTA B/L  	CV: RRR, S1S2; no rub  	Abd: +BS, soft, nontender  	UE: Warm,   	LE: Warm, no edema  	Neuro: No focal deficits  	Psych: Normal affect and mood  	Skin: Warm,  LABS/STUDIES  --------------------------------------------------------------------------------              7.6    8.33  >-----------<  133      [01-06-25 @ 06:42]              22.7     127  |  97  |  20.7  ----------------------------<  97      [01-06-25 @ 06:42]  4.6   |  21.0  |  0.91        Ca     7.8     [01-06-25 @ 06:42]      Mg     2.5     [01-06-25 @ 06:42]            TSH 0.80      [01-06-25 @ 06:42]     [facility] DIVISION OF KIDNEY DISEASES AND HYPERTENSION -- PROGRESS NOTE  --------------------------------------------------------------------------------  Chief Complaint: Hyponatremia    24 hour events/subjective:  no aucte event noted  pt seen and examined; family at bedside  pt has no new complaint      PAST HISTORY  --------------------------------------------------------------------------------  No significant changes to PMH, PSH, FHx, SHx, unless otherwise noted    ALLERGIES & MEDICATIONS  --------------------------------------------------------------------------------  Allergies    Robaxin (Other)  codeine (Unknown)  Demerol HCl (Nausea)  Bactrim (Other)  shellfish (Rash)  sulfa drugs (Other)    Intolerances      Standing Inpatient Medications  acetaminophen     Tablet .. 975 milliGRAM(s) Oral every 8 hours  apixaban 2.5 milliGRAM(s) Oral two times a day  aspirin enteric coated 81 milliGRAM(s) Oral daily  gabapentin 300 milliGRAM(s) Oral at bedtime  ketorolac   Injectable 15 milliGRAM(s) IV Push once  levothyroxine 50 MICROGram(s) Oral daily  metoprolol tartrate 25 milliGRAM(s) Oral two times a day  pantoprazole    Tablet 40 milliGRAM(s) Oral before breakfast  polyethylene glycol 3350 17 Gram(s) Oral at bedtime  senna 2 Tablet(s) Oral at bedtime  sodium chloride 1 Gram(s) Oral three times a day  sodium chloride 2% . 500 milliLiter(s) IV Continuous <Continuous>    PRN Inpatient Medications  aluminum hydroxide/magnesium hydroxide/simethicone Suspension 30 milliLiter(s) Oral four times a day PRN  bisacodyl Suppository 10 milliGRAM(s) Rectal once PRN  magnesium hydroxide Suspension 30 milliLiter(s) Oral daily PRN  metoprolol tartrate Injectable 5 milliGRAM(s) IV Push every 6 hours PRN  ondansetron Injectable 4 milliGRAM(s) IV Push every 6 hours PRN  traMADol 25 milliGRAM(s) Oral every 4 hours PRN  traMADol 50 milliGRAM(s) Oral every 4 hours PRN      REVIEW OF SYSTEMS  --------------------------------------------------------------------------------  Gen: No weight changes, fatigue, fevers/chills, weakness  Skin: No rashes  Head/Eyes/Ears/Mouth: No headache  Respiratory: No dyspnea, cough,  CV: No chest pain, orthopnea  GI: No abdominal pain, diarrhea, constipation, nausea, vomiting,  MSK: No joint pain  Neuro: No dizziness/lightheadedness, weakness  Heme: No bleeding  Psych: No significant nervousness, anxiety, stress, depression    All other systems were reviewed and are negative, except as noted.    VITALS/PHYSICAL EXAM  --------------------------------------------------------------------------------  T(C): 36.3 (01-06-25 @ 13:33), Max: 36.7 (01-05-25 @ 21:00)  HR: 82 (01-06-25 @ 13:33) (73 - 82)  BP: 106/64 (01-06-25 @ 13:33) (106/64 - 137/62)  RR: 16 (01-06-25 @ 13:33) (14 - 18)  SpO2: 96% (01-06-25 @ 13:33) (96% - 99%)  Wt(kg): --        01-05-25 @ 07:01  -  01-06-25 @ 07:00  --------------------------------------------------------  IN: 1400 mL / OUT: 950 mL / NET: 450 mL      Physical Exam:  	Gen: NAD, well-appearing  	HEENT: PERRL, supple neck,  	Pulm: CTA B/L  	CV: RRR, S1S2; no rub  	Abd: +BS, soft, nontender  	UE: Warm,   	LE: Warm, no edema  	Neuro: No focal deficits  	Psych: Normal affect and mood  	Skin: Warm,  LABS/STUDIES  --------------------------------------------------------------------------------              7.6    8.33  >-----------<  133      [01-06-25 @ 06:42]              22.7     127  |  97  |  20.7  ----------------------------<  97      [01-06-25 @ 06:42]  4.6   |  21.0  |  0.91        Ca     7.8     [01-06-25 @ 06:42]      Mg     2.5     [01-06-25 @ 06:42]            TSH 0.80      [01-06-25 @ 06:42]

## 2025-01-06 NOTE — PROGRESS NOTE ADULT - ASSESSMENT
89 YOF hypertension, hyponatremia, hypothyroidism and admitted for elective total hip arthroplasty done on 1/2/2024.  Nephrology consulted for hyponatremia.    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  Nephrology consulted for hyponatremia.    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

## 2025-01-07 VITALS
TEMPERATURE: 98 F | RESPIRATION RATE: 19 BRPM | SYSTOLIC BLOOD PRESSURE: 130 MMHG | OXYGEN SATURATION: 100 % | DIASTOLIC BLOOD PRESSURE: 69 MMHG | HEART RATE: 93 BPM

## 2025-01-07 LAB
ANION GAP SERPL CALC-SCNC: 10 MMOL/L — SIGNIFICANT CHANGE UP (ref 5–17)
BLD GP AB SCN SERPL QL: SIGNIFICANT CHANGE UP
BUN SERPL-MCNC: 20.3 MG/DL — HIGH (ref 8–20)
CALCIUM SERPL-MCNC: 7.9 MG/DL — LOW (ref 8.4–10.5)
CHLORIDE SERPL-SCNC: 100 MMOL/L — SIGNIFICANT CHANGE UP (ref 96–108)
CO2 SERPL-SCNC: 21 MMOL/L — LOW (ref 22–29)
CREAT SERPL-MCNC: 0.88 MG/DL — SIGNIFICANT CHANGE UP (ref 0.5–1.3)
EGFR: 61 ML/MIN/1.73M2 — SIGNIFICANT CHANGE UP
GLUCOSE SERPL-MCNC: 102 MG/DL — HIGH (ref 70–99)
HCT VFR BLD CALC: 22.3 % — LOW (ref 34.5–45)
HGB BLD-MCNC: 7.4 G/DL — LOW (ref 11.5–15.5)
MCHC RBC-ENTMCNC: 32.7 PG — SIGNIFICANT CHANGE UP (ref 27–34)
MCHC RBC-ENTMCNC: 33.2 G/DL — SIGNIFICANT CHANGE UP (ref 32–36)
MCV RBC AUTO: 98.7 FL — SIGNIFICANT CHANGE UP (ref 80–100)
PLATELET # BLD AUTO: 154 K/UL — SIGNIFICANT CHANGE UP (ref 150–400)
POTASSIUM SERPL-MCNC: 4.8 MMOL/L — SIGNIFICANT CHANGE UP (ref 3.5–5.3)
POTASSIUM SERPL-SCNC: 4.8 MMOL/L — SIGNIFICANT CHANGE UP (ref 3.5–5.3)
RBC # BLD: 2.26 M/UL — LOW (ref 3.8–5.2)
RBC # FLD: 14.3 % — SIGNIFICANT CHANGE UP (ref 10.3–14.5)
SODIUM SERPL-SCNC: 131 MMOL/L — LOW (ref 135–145)
WBC # BLD: 7.92 K/UL — SIGNIFICANT CHANGE UP (ref 3.8–10.5)
WBC # FLD AUTO: 7.92 K/UL — SIGNIFICANT CHANGE UP (ref 3.8–10.5)

## 2025-01-07 PROCEDURE — P9016: CPT

## 2025-01-07 PROCEDURE — 84300 ASSAY OF URINE SODIUM: CPT

## 2025-01-07 PROCEDURE — 84443 ASSAY THYROID STIM HORMONE: CPT

## 2025-01-07 PROCEDURE — 97163 PT EVAL HIGH COMPLEX 45 MIN: CPT

## 2025-01-07 PROCEDURE — S2900: CPT

## 2025-01-07 PROCEDURE — 83935 ASSAY OF URINE OSMOLALITY: CPT

## 2025-01-07 PROCEDURE — C1889: CPT

## 2025-01-07 PROCEDURE — 86923 COMPATIBILITY TEST ELECTRIC: CPT

## 2025-01-07 PROCEDURE — P9040: CPT

## 2025-01-07 PROCEDURE — 36415 COLL VENOUS BLD VENIPUNCTURE: CPT

## 2025-01-07 PROCEDURE — 86901 BLOOD TYPING SEROLOGIC RH(D): CPT

## 2025-01-07 PROCEDURE — 84133 ASSAY OF URINE POTASSIUM: CPT

## 2025-01-07 PROCEDURE — 97110 THERAPEUTIC EXERCISES: CPT

## 2025-01-07 PROCEDURE — 86900 BLOOD TYPING SEROLOGIC ABO: CPT

## 2025-01-07 PROCEDURE — 99233 SBSQ HOSP IP/OBS HIGH 50: CPT

## 2025-01-07 PROCEDURE — 80048 BASIC METABOLIC PNL TOTAL CA: CPT

## 2025-01-07 PROCEDURE — 97530 THERAPEUTIC ACTIVITIES: CPT

## 2025-01-07 PROCEDURE — 84100 ASSAY OF PHOSPHORUS: CPT

## 2025-01-07 PROCEDURE — C1713: CPT

## 2025-01-07 PROCEDURE — 97116 GAIT TRAINING THERAPY: CPT

## 2025-01-07 PROCEDURE — 73502 X-RAY EXAM HIP UNI 2-3 VIEWS: CPT

## 2025-01-07 PROCEDURE — 86850 RBC ANTIBODY SCREEN: CPT

## 2025-01-07 PROCEDURE — 83735 ASSAY OF MAGNESIUM: CPT

## 2025-01-07 PROCEDURE — C1776: CPT

## 2025-01-07 PROCEDURE — 85027 COMPLETE CBC AUTOMATED: CPT

## 2025-01-07 PROCEDURE — 36430 TRANSFUSION BLD/BLD COMPNT: CPT

## 2025-01-07 RX ORDER — METOPROLOL TARTRATE 50 MG
25 TABLET ORAL THREE TIMES A DAY
Refills: 0 | Status: DISCONTINUED | OUTPATIENT
Start: 2025-01-07 | End: 2025-01-07

## 2025-01-07 RX ORDER — METOPROLOL SUCCINATE 50 MG/1
1 TABLET, EXTENDED RELEASE ORAL
Qty: 42 | Refills: 0 | DISCHARGE
Start: 2025-01-07 | End: 2025-01-20

## 2025-01-07 RX ORDER — SODIUM CHLORIDE 9 MG/ML
2 INJECTION, SOLUTION INTRAMUSCULAR; INTRAVENOUS; SUBCUTANEOUS
Qty: 28 | Refills: 0
Start: 2025-01-07 | End: 2025-01-20

## 2025-01-07 RX ORDER — ASPIRIN 81 MG
1 TABLET, DELAYED RELEASE (ENTERIC COATED) ORAL
Refills: 0 | DISCHARGE

## 2025-01-07 RX ORDER — METOPROLOL TARTRATE 50 MG
1 TABLET ORAL
Qty: 42 | Refills: 0
Start: 2025-01-07 | End: 2025-01-20

## 2025-01-07 RX ORDER — SPIRONOLACTONE 50 MG/1
25 TABLET ORAL DAILY
Refills: 0 | Status: DISCONTINUED | OUTPATIENT
Start: 2025-01-07 | End: 2025-01-07

## 2025-01-07 RX ORDER — SPIRONOLACTONE 50 MG/1
1 TABLET ORAL
Qty: 0 | Refills: 0 | DISCHARGE
Start: 2025-01-07

## 2025-01-07 RX ADMIN — SPIRONOLACTONE 25 MILLIGRAM(S): 50 TABLET ORAL at 15:28

## 2025-01-07 RX ADMIN — APIXABAN 2.5 MILLIGRAM(S): 5 TABLET, FILM COATED ORAL at 06:20

## 2025-01-07 RX ADMIN — PANTOPRAZOLE 40 MILLIGRAM(S): 40 TABLET, DELAYED RELEASE ORAL at 06:21

## 2025-01-07 RX ADMIN — Medication 25 MILLIGRAM(S): at 06:20

## 2025-01-07 RX ADMIN — ACETAMINOPHEN 975 MILLIGRAM(S): 80 SOLUTION/ DROPS ORAL at 06:19

## 2025-01-07 RX ADMIN — Medication 25 MILLIGRAM(S): at 15:28

## 2025-01-07 RX ADMIN — SODIUM CHLORIDE 2 GRAM(S): 9 INJECTION, SOLUTION INTRAMUSCULAR; INTRAVENOUS; SUBCUTANEOUS at 06:20

## 2025-01-07 RX ADMIN — ACETAMINOPHEN 975 MILLIGRAM(S): 80 SOLUTION/ DROPS ORAL at 16:25

## 2025-01-07 RX ADMIN — LEVOTHYROXINE SODIUM 50 MICROGRAM(S): 175 TABLET ORAL at 06:20

## 2025-01-07 RX ADMIN — ACETAMINOPHEN 975 MILLIGRAM(S): 80 SOLUTION/ DROPS ORAL at 15:28

## 2025-01-07 NOTE — PROGRESS NOTE ADULT - PROVIDER SPECIALTY LIST ADULT
Nephrology
Orthopedics
Hospitalist
Orthopedics
Hospitalist
Nephrology
Hospitalist

## 2025-01-07 NOTE — PROGRESS NOTE ADULT - ASSESSMENT
89 YOF hypertension, hyponatremia, hypothyroidism and admitted for elective total hip arthroplasty done on 1/2/2024.  Nephrology consulted for hyponatremia.    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

## 2025-01-07 NOTE — PROGRESS NOTE ADULT - SUBJECTIVE AND OBJECTIVE BOX
OFELIA GUNDERSONMARIA ELENA    [id number]    History: Patient is status post left posterior total hip arthroplasty, POD#5. Patient is doing well. The patient's pain is controlled using the prescribed pain medications. The patient is participating in physical therapy. Denies nausea, vomiting, chest pain, shortness of breath, abdominal pain or fever. No new complaints.                              7.1    7.42  )-----------( 136      ( 06 Jan 2025 17:15 )             21.3     01-06    129[L]  |  99  |  23.2[H]  ----------------------------<  117[H]  5.0   |  21.0[L]  |  0.84    Ca    7.7[L]      06 Jan 2025 17:15  Mg     2.5     01-06        MEDICATIONS  (STANDING):  acetaminophen     Tablet .. 975 milliGRAM(s) Oral every 8 hours  apixaban 2.5 milliGRAM(s) Oral two times a day  aspirin enteric coated 81 milliGRAM(s) Oral daily  gabapentin 300 milliGRAM(s) Oral at bedtime  ketorolac   Injectable 15 milliGRAM(s) IV Push once  levothyroxine 50 MICROGram(s) Oral daily  metoprolol tartrate 25 milliGRAM(s) Oral two times a day  pantoprazole    Tablet 40 milliGRAM(s) Oral before breakfast  polyethylene glycol 3350 17 Gram(s) Oral at bedtime  senna 2 Tablet(s) Oral at bedtime  sodium chloride 2 Gram(s) Oral two times a day  sodium chloride 2% . 500 milliLiter(s) (50 mL/Hr) IV Continuous <Continuous>    MEDICATIONS  (PRN):  aluminum hydroxide/magnesium hydroxide/simethicone Suspension 30 milliLiter(s) Oral four times a day PRN Indigestion  bisacodyl Suppository 10 milliGRAM(s) Rectal once PRN Constipation  magnesium hydroxide Suspension 30 milliLiter(s) Oral daily PRN Constipation  metoprolol tartrate Injectable 5 milliGRAM(s) IV Push every 6 hours PRN FOR HR> 120  ondansetron Injectable 4 milliGRAM(s) IV Push every 6 hours PRN Nausea and/or Vomiting  traMADol 25 milliGRAM(s) Oral every 4 hours PRN Moderate Pain (4 - 6)  traMADol 50 milliGRAM(s) Oral every 4 hours PRN Severe Pain (7 - 10)    Vital Signs Last 24 Hrs  T(C): 36.4 (07 Jan 2025 05:35), Max: 36.9 (06 Jan 2025 15:46)  T(F): 97.5 (07 Jan 2025 05:35), Max: 98.4 (06 Jan 2025 15:46)  HR: 86 (07 Jan 2025 05:35) (73 - 95)  BP: 125/71 (07 Jan 2025 05:35) (106/64 - 125/71)  BP(mean): --  RR: 18 (07 Jan 2025 05:35) (14 - 18)  SpO2: 99% (07 Jan 2025 05:35) (95% - 100%)    Parameters below as of 07 Jan 2025 05:35  Patient On (Oxygen Delivery Method): room air  Lying in bed in NAD, awake and alert    Physical exam: Left lower extremity- The left hip mepilex dressing is clean, dry and intact. No drainage or discharge. No erythema is noted. No blistering. No ecchymosis. The calf is supple. No calf tenderness. Sensation to light touch is grossly intact distally. Motor function distally is 5/5. +DF/PF. +EHL/FHL. No foot drop. 2+ dorsalis pedis pulse. Capillary refill is less than 2 seconds. No cyanosis.    Primary Orthopedic Assessment:  • s/p LEFT POSTERIOR total hip replacement, POD#5      Secondary  Medical Assessment(s):   • post op anemia, hyponatremia    Plan:   - will d/w Dr Sprague transfusing 1 unit  - Na tabs  • DVT prophylaxis as prescribed- eliquis, including use of compression devices and ankle pumps  • Continue physical therapy  • Weightbearing as tolerated of the left lower extremity with assistance of a walker  • Incentive spirometry encouraged  • Pain control as clinically indicated  • Posterior hip precautions reviewed with patient  • Discharge planning – anticipated discharge is Home after cleared by PT and medicine

## 2025-01-07 NOTE — PROGRESS NOTE ADULT - SUBJECTIVE AND OBJECTIVE BOX
[facility] DIVISION OF KIDNEY DISEASES AND HYPERTENSION -- FOLLOW UP NOTE  --------------------------------------------------------------------------------  Chief Complaint: hyponatremia    24 hour events/subjective:  sp 2 % NS yesterday        PAST HISTORY  --------------------------------------------------------------------------------  No significant changes to PMH, PSH, FHx, SHx, unless otherwise noted    ALLERGIES & MEDICATIONS  --------------------------------------------------------------------------------  Allergies    Robaxin (Other)  codeine (Unknown)  Demerol HCl (Nausea)  Bactrim (Other)  shellfish (Rash)  sulfa drugs (Other)    Intolerances      Standing Inpatient Medications  acetaminophen     Tablet .. 975 milliGRAM(s) Oral every 8 hours  apixaban 2.5 milliGRAM(s) Oral two times a day  aspirin enteric coated 81 milliGRAM(s) Oral daily  gabapentin 300 milliGRAM(s) Oral at bedtime  ketorolac   Injectable 15 milliGRAM(s) IV Push once  levothyroxine 50 MICROGram(s) Oral daily  metoprolol tartrate 25 milliGRAM(s) Oral two times a day  pantoprazole    Tablet 40 milliGRAM(s) Oral before breakfast  polyethylene glycol 3350 17 Gram(s) Oral at bedtime  senna 2 Tablet(s) Oral at bedtime  sodium chloride 2 Gram(s) Oral two times a day  sodium chloride 2% . 500 milliLiter(s) IV Continuous <Continuous>    PRN Inpatient Medications  aluminum hydroxide/magnesium hydroxide/simethicone Suspension 30 milliLiter(s) Oral four times a day PRN  bisacodyl Suppository 10 milliGRAM(s) Rectal once PRN  magnesium hydroxide Suspension 30 milliLiter(s) Oral daily PRN  metoprolol tartrate Injectable 5 milliGRAM(s) IV Push every 6 hours PRN  ondansetron Injectable 4 milliGRAM(s) IV Push every 6 hours PRN  traMADol 25 milliGRAM(s) Oral every 4 hours PRN  traMADol 50 milliGRAM(s) Oral every 4 hours PRN      REVIEW OF SYSTEMS  --------------------------------------------------------------------------------  Gen: No weight changes, fatigue, fevers/chills, weakness  Skin: No rashes  Head/Eyes/Ears/Mouth: No headache; Normal hearing; Normal vision w/o blurriness; No sinus pain/discomfort, sore throat  Respiratory: No dyspnea, cough, wheezing, hemoptysis  CV: No chest pain, PND, orthopnea  GI: No abdominal pain, diarrhea, constipation, nausea, vomiting, melena, hematochezia  : No increased frequency, dysuria, hematuria, nocturia  MSK: No joint pain/swelling; no back pain; no edema  Neuro: No dizziness/lightheadedness, weakness, seizures, numbness, tingling  Heme: No easy bruising or bleeding  Endo: No heat/cold intolerance  Psych: No significant nervousness, anxiety, stress, depression    All other systems were reviewed and are negative, except as noted.    VITALS/PHYSICAL EXAM  --------------------------------------------------------------------------------  T(C): 36.4 (01-07-25 @ 05:35), Max: 36.9 (01-06-25 @ 15:46)  HR: 86 (01-07-25 @ 05:35) (73 - 95)  BP: 125/71 (01-07-25 @ 05:35) (106/64 - 125/71)  RR: 18 (01-07-25 @ 05:35) (14 - 18)  SpO2: 99% (01-07-25 @ 05:35) (95% - 100%)  Wt(kg): --        01-06-25 @ 07:01  -  01-07-25 @ 07:00  --------------------------------------------------------  IN: 1200 mL / OUT: 1220 mL / NET: -20 mL      Physical Exam:  	Gen: NAD, well-appearing  	HEENT: PERRL, supple neck, clear oropharynx  	Pulm: CTA B/L  	CV: RRR, S1S2; no rub  	Back: No spinal or CVA tenderness; no sacral edema  	Abd: +BS, soft, nontender/nondistended  	: No suprapubic tenderness  	UE: Warm, FROM, no clubbing, intact strength; no edema; no asterixis  	LE: Warm, FROM, no clubbing, intact strength; no edema  	Neuro: No focal deficits, intact gait  	Psych: Normal affect and mood  	Skin: Warm, without rashes  	Vascular access:    LABS/STUDIES  --------------------------------------------------------------------------------              7.4    7.92  >-----------<  154      [01-07-25 @ 06:58]              22.3     131  |  100  |  20.3  ----------------------------<  102      [01-07-25 @ 06:58]  4.8   |  21.0  |  0.88        Ca     7.9     [01-07-25 @ 06:58]      Mg     2.5     [01-06-25 @ 06:42]            Creatinine Trend:  SCr 0.88 [01-07 @ 06:58]  SCr 0.84 [01-06 @ 17:15]  SCr 0.91 [01-06 @ 06:42]  SCr 1.09 [01-05 @ 16:23]  SCr 1.19 [01-05 @ 08:20]    Urinalysis - [01-07-25 @ 06:58]      Color  / Appearance  / SG  / pH       Gluc 102 / Ketone   / Bili  / Urobili        Blood  / Protein  / Leuk Est  / Nitrite       RBC  / WBC  / Hyaline  / Gran  / Sq Epi  / Non Sq Epi  / Bacteria     Urine Sodium 39      [01-05-25 @ 13:13]  Urine Potassium 29      [01-05-25 @ 13:13]  Urine Osmolality 348      [01-05-25 @ 13:13]    TSH 0.80      [01-06-25 @ 06:42]       [facility] DIVISION OF KIDNEY DISEASES AND HYPERTENSION -- FOLLOW UP NOTE  --------------------------------------------------------------------------------  Chief Complaint: hyponatremia    24 hour events/subjective:  sp 2 % NS yesterday  Pt seen and examined; feels well        PAST HISTORY  --------------------------------------------------------------------------------  No significant changes to PMH, PSH, FHx, SHx, unless otherwise noted    ALLERGIES & MEDICATIONS  --------------------------------------------------------------------------------  Allergies    Robaxin (Other)  codeine (Unknown)  Demerol HCl (Nausea)  Bactrim (Other)  shellfish (Rash)  sulfa drugs (Other)    Intolerances      Standing Inpatient Medications  acetaminophen     Tablet .. 975 milliGRAM(s) Oral every 8 hours  apixaban 2.5 milliGRAM(s) Oral two times a day  aspirin enteric coated 81 milliGRAM(s) Oral daily  gabapentin 300 milliGRAM(s) Oral at bedtime  ketorolac   Injectable 15 milliGRAM(s) IV Push once  levothyroxine 50 MICROGram(s) Oral daily  metoprolol tartrate 25 milliGRAM(s) Oral two times a day  pantoprazole    Tablet 40 milliGRAM(s) Oral before breakfast  polyethylene glycol 3350 17 Gram(s) Oral at bedtime  senna 2 Tablet(s) Oral at bedtime  sodium chloride 2 Gram(s) Oral two times a day  sodium chloride 2% . 500 milliLiter(s) IV Continuous <Continuous>    PRN Inpatient Medications  aluminum hydroxide/magnesium hydroxide/simethicone Suspension 30 milliLiter(s) Oral four times a day PRN  bisacodyl Suppository 10 milliGRAM(s) Rectal once PRN  magnesium hydroxide Suspension 30 milliLiter(s) Oral daily PRN  metoprolol tartrate Injectable 5 milliGRAM(s) IV Push every 6 hours PRN  ondansetron Injectable 4 milliGRAM(s) IV Push every 6 hours PRN  traMADol 25 milliGRAM(s) Oral every 4 hours PRN  traMADol 50 milliGRAM(s) Oral every 4 hours PRN      REVIEW OF SYSTEMS  --------------------------------------------------------------------------------  Gen: No weight changes, fatigue, fevers/chills, weakness  Skin: No rashes  Head/Eyes/Ears/Mouth: No headache; Normal hearing; Normal vision w/o blurriness; No sinus pain/discomfort, sore throat  Respiratory: No dyspnea, cough, wheezing, hemoptysis  CV: No chest pain, PND, orthopnea  GI: No abdominal pain, diarrhea, constipation, nausea, vomiting, melena, hematochezia  : No increased frequency, dysuria, hematuria, nocturia  MSK: No joint pain/swelling; no back pain; no edema  Neuro: No dizziness/lightheadedness, weakness, seizures, numbness, tingling  Heme: No easy bruising or bleeding  Endo: No heat/cold intolerance  Psych: No significant nervousness, anxiety, stress, depression    All other systems were reviewed and are negative, except as noted.    VITALS/PHYSICAL EXAM  --------------------------------------------------------------------------------  T(C): 36.4 (01-07-25 @ 05:35), Max: 36.9 (01-06-25 @ 15:46)  HR: 86 (01-07-25 @ 05:35) (73 - 95)  BP: 125/71 (01-07-25 @ 05:35) (106/64 - 125/71)  RR: 18 (01-07-25 @ 05:35) (14 - 18)  SpO2: 99% (01-07-25 @ 05:35) (95% - 100%)  Wt(kg): --        01-06-25 @ 07:01  -  01-07-25 @ 07:00  --------------------------------------------------------  IN: 1200 mL / OUT: 1220 mL / NET: -20 mL      Physical Exam:  	Gen: NAD, well-appearing  	HEENT: PERRL, supple neck, clear oropharynx  	Pulm: CTA B/L  	CV: RRR, S1S2; no rub  	Back: No spinal or CVA tenderness; no sacral edema  	Abd: +BS, soft, nontender/nondistended  	: No suprapubic tenderness  	UE: Warm, FROM, no clubbing, intact strength; no edema; no asterixis  	LE: Warm, FROM, no clubbing, intact strength; no edema  	Neuro: No focal deficits, intact gait  	Psych: Normal affect and mood  	Skin: Warm, without rashes  	Vascular access:    LABS/STUDIES  --------------------------------------------------------------------------------              7.4    7.92  >-----------<  154      [01-07-25 @ 06:58]              22.3     131  |  100  |  20.3  ----------------------------<  102      [01-07-25 @ 06:58]  4.8   |  21.0  |  0.88        Ca     7.9     [01-07-25 @ 06:58]      Mg     2.5     [01-06-25 @ 06:42]            Creatinine Trend:  SCr 0.88 [01-07 @ 06:58]  SCr 0.84 [01-06 @ 17:15]  SCr 0.91 [01-06 @ 06:42]  SCr 1.09 [01-05 @ 16:23]  SCr 1.19 [01-05 @ 08:20]    Urinalysis - [01-07-25 @ 06:58]      Color  / Appearance  / SG  / pH       Gluc 102 / Ketone   / Bili  / Urobili        Blood  / Protein  / Leuk Est  / Nitrite       RBC  / WBC  / Hyaline  / Gran  / Sq Epi  / Non Sq Epi  / Bacteria     Urine Sodium 39      [01-05-25 @ 13:13]  Urine Potassium 29      [01-05-25 @ 13:13]  Urine Osmolality 348      [01-05-25 @ 13:13]    TSH 0.80      [01-06-25 @ 06:42]

## 2025-01-07 NOTE — PROGRESS NOTE ADULT - SUBJECTIVE AND OBJECTIVE BOX
1/6/2017    Follow up on pituitary microadenoma.     Pt was evaluated few yrs ago for ED, fatigue at [facility] and found to have a pituitary microadenoma 2 yrs ago.   MRI pituitary gland: 11/14/2012 and another one Jan 17, 2013.   Pituitary gland is nml size, there is a small approximately 5mm area of hypo enhancement in the left lateral aspect of the gland suggesting a microadenoma. Pituitary infundibulum is central.   02/2014: Prolactin 89.8  03/2013: Prolactin 75 ng/mL  IGF-1 128 alpha subunit <0.3  ACTH 20  Cortisol 11.9  Vit D 32.4  TRH 6.84  Iron studies good  On cabetroline 0.25mg twice a week. Rarely misses doses.  Most recent Prl level at 2.2.     Dx with hyperthyroidism at age 34.   Family h/o thyroid disease: mother, 2 sisters, possibly a brother.   No family h/o thyroid cancer. No neck radiation. H/o Grave's disease in 2004 treated with LEE. On levothyroxine at 175mcg Mon-Sat and 0.5 tablet on Sunday at night. Takes appropriately.      HISTORY  PMH, social, family history reviewed.   Allergies and medications reviewed.     LABS:   Prl 2.2    TSH (mcUnits/mL)   Date Value   04/11/2016 2.109     Albumin (g/dL)   Date Value   07/16/2014 4.9     Creatinine (mg/dL)   Date Value   04/11/2016 0.71       REVIEW OF SYSTEMS  Eye: no visual blurring. No issues with peripheral vision. No double vision. Wearing glasses.   ENT: No headaches, no lightheadedness.   Cardiovascular: No palpitations, no chest pain. No LE edema.   Respiratory: No cough, shortness of breath. MARCELL, using  CPAP. No h/o asthma.   Gastro-intestinal: Appetite good. No nausea, diarrhea or constipation.   Genito-urinary: No nocturia, dysuria, no urgency with urination. H/o ED and low libido improved.   Musculoskeletal: Some shooting pain to left upper and lower arm. No muscular weakness. B/l knee pain-chronic.    Integumentary: No rashes or ulcerations, no itching, bruising.   Neurological: No numbness or tingling in upper or lower extremities.   Patient seen and examined . Comfortable , awake , alert , more oriented , AAOX 2 , not to year , voiding , had MM yesterday .   Pain well controlled     CC : as above       MEDICATIONS  (STANDING):  acetaminophen     Tablet .. 975 milliGRAM(s) Oral every 8 hours  apixaban 2.5 milliGRAM(s) Oral two times a day  aspirin enteric coated 81 milliGRAM(s) Oral daily  gabapentin 300 milliGRAM(s) Oral at bedtime  ketorolac   Injectable 15 milliGRAM(s) IV Push once  levothyroxine 50 MICROGram(s) Oral daily  metoprolol tartrate 25 milliGRAM(s) Oral three times a day  pantoprazole    Tablet 40 milliGRAM(s) Oral before breakfast  polyethylene glycol 3350 17 Gram(s) Oral at bedtime  senna 2 Tablet(s) Oral at bedtime  sodium chloride 2 Gram(s) Oral two times a day  sodium chloride 2% . 500 milliLiter(s) (50 mL/Hr) IV Continuous <Continuous>    MEDICATIONS  (PRN):  aluminum hydroxide/magnesium hydroxide/simethicone Suspension 30 milliLiter(s) Oral four times a day PRN Indigestion  bisacodyl Suppository 10 milliGRAM(s) Rectal once PRN Constipation  magnesium hydroxide Suspension 30 milliLiter(s) Oral daily PRN Constipation  metoprolol tartrate Injectable 5 milliGRAM(s) IV Push every 6 hours PRN FOR HR> 120  ondansetron Injectable 4 milliGRAM(s) IV Push every 6 hours PRN Nausea and/or Vomiting  traMADol 25 milliGRAM(s) Oral every 4 hours PRN Moderate Pain (4 - 6)  traMADol 50 milliGRAM(s) Oral every 4 hours PRN Severe Pain (7 - 10)      LABS:                          7.4    7.92  )-----------( 154      ( 07 Jan 2025 06:58 )             22.3     01-07    131[L]  |  100  |  20.3[H]  ----------------------------<  102[H]  4.8   |  21.0[L]  |  0.88    Ca    7.9[L]      07 Jan 2025 06:58  Mg     2.5     01-06        RADIOLOGY & ADDITIONAL TESTS:      REVIEW OF SYSTEMS:    As above , all other systems are reviewed and are negative .     I&O's Summary    06 Jan 2025 07:01  -  07 Jan 2025 07:00  --------------------------------------------------------  IN: 1200 mL / OUT: 1220 mL / NET: -20 mL          Vital Signs Last 24 Hrs  T(C): 36.4 (07 Jan 2025 09:12), Max: 36.9 (06 Jan 2025 15:46)  T(F): 97.6 (07 Jan 2025 09:12), Max: 98.4 (06 Jan 2025 15:46)  HR: 78 (07 Jan 2025 09:12) (76 - 95)  BP: 92/53 (07 Jan 2025 09:12) (92/53 - 125/71)  BP(mean): --  RR: 16 (07 Jan 2025 09:12) (16 - 18)  SpO2: 98% (07 Jan 2025 09:12) (95% - 100%)    Parameters below as of 07 Jan 2025 09:12  Patient On (Oxygen Delivery Method): room air      PHYSICAL EXAM:    GENERAL: NAD,  HEAD:  Atraumatic, Normocephalic  EYES: EOMI, PERRLA, conjunctiva and sclera clear  NECK: Supple, No JVD, Normal thyroid  NERVOUS SYSTEM:  Alert & Oriented X2, no focal deficit  CHEST/LUNG: CTA b/l ,  no  rales, rhonchi, wheezing, or rubs  HEART: Regular rate and rhythm; No murmurs, rubs, or gallops  ABDOMEN: Soft, Nontender, Nondistended; Bowel sounds present  EXTREMITIES:  2+ Peripheral Pulses, No clubbing, cyanosis, or edema,   L hip with dry and clean dressing +   LYMPH: No lymphadenopathy noted  SKIN: No rashes or lesions     Psychiatric: Recent situational depression. No anxiety, sleep disturbances.   Endocrine: hypothyroidism s/p LEE for Graves'. No galactorrhea or breast tenderness. Some erectile dysfunction recently.    Hematologic/Lymphatic: No anemia. Trouble losing weight.     PHYSICAL EXAM  Visit Vitals   • /64   • Pulse 54   • Ht 5' 9\" (1.753 m)   • Wt 111 kg   • BMI 36.14 kg/m2   Constitutional:  resting, normal appearance and well nourished  Psychiatric:  alert and oriented x3, normal mood and affect  Eyes: clear conjunctiva, no icterus or erythema noted.   Neck: supple, thyroid gland is not enlarged, trachea midline.   Lungs: CTAB. Non-labored.   CV: S1, S2, RRR, no murmur. No LE edema.  GI:  nontender, nondistended, no masses, soft.   Skin: warm and dry and normal texture and temperature, no rashes or ulcerations.   Musculoskeletal:  normal gait, no clubbing or cyanosis and 5/5 strength in all extremities.   Neurologic: EOMI, CN 2-12 appear grossly intact. No tremor.      ASSESSMENT  Hypothyroidism-post ablative  H/o hyperthyroidism  Pituitary microadenoma  Obesity with Body mass index is 36.14 kg/(m^2).    PLAN    Pituitary microadenoma. Most recent prolactin at 2.2 from 3/14/2016. If he still had erectile dysfunction with normal prolactin, could check testosterone levels. He does have MARCELL and a history of MI, although the MI seems to be from possible increased myocardial demand during hyperthyroidism.  - Continue cabergoline at 0.25mg twice a week.   - Recheck prolactin level     Hypothyroidism:   - Continue levothyroxine 175 mcg Mon-Sat and 0.5 tablet on Sunday. He can take these at night, as he has been doing.   - Recheck TSH     Hyperlipidemia.   - Continue statin  - Check lipid panel    He should call PMD about likely musculoskeletal injury to left arm.     LÓPEZ Kuo, ANP-BC

## 2025-01-07 NOTE — PROGRESS NOTE ADULT - ASSESSMENT
89 yo with Hx HTN afib s/p pacemaker, anemia,  HTN, Hypothyroidism, anxiety and depression, she has left hip pain from a fall 2023- completed ORIF then as well, Attends chiropractor initially 3x/week but now goes once every other week. Takes extra strength tylenol with minimal relief, she comes in here for elective left conversion total hip arthroplasty on 1/2/2024 s/p procedure.       Plan:     Left hip pain s/p left conversion total hip arthroplasty post op day 05:   PT/OT/pain mgmt  DVT prophylaxis- as per ortho  Abx as per SCIP- given   Incentive spirometry  Prophylaxis of opioid  induced constipation.  Wound care as per ortho     Hyponatremia, chronic - likely due to SIADH - 2% NS given , restarted on Na tabs   Sodium postop down to 121, today 131.   Renal following - appreciated , continue water restriction to 1.2 L , sodium tabs increased to 2 gm BID -   continue - follow up with renal in 10 -14 days     Hyperkalemia- resolved     Acute blood loss anemia due to procedure in setting of chronic anemia: EBL- 100 ml , Hg yesterday noted - 7.5 , s/p 1 U PRBC,   post ob , Hg yesterday 7.4--> 7.1, this am 7.4. Earlier vitals stable , now hypotensive with SBP in 90's -   will transfuse 1 U PRBC , will give Lasix 20 mg ivp after transfusion if SBP > 100        chronic AFib - cardiac monitor noted with RVR -120'S - this am in 90's -   will increase Metoprolol titrate to 25 mg TID with holding parameters , Lopressor 5 mg  IVP  Q 6 hr PRN  for HR > 120   - continue  eliquis  - follow with cardiologist as on outpatient       Hypomagnesemia - supplemented     HFpEF -  HTN   -cont metoprolol  -  will restart Spironolactone 25 mg daily      Hypothyroid   -cont synthroid. TSH - nl     Confusion - metabolic encephalopathy - likely due to hyponatremia- now back to base    Not stable to for discharge today.    Spoke to daughter ( Mrs Bashir Meadows _ made aware of plan     D/W ortho PA / NURSE - AWARE OF PLAN .   Will follow along with you     If patient tolerates blood transfusion and BP > 100 after transfusion and Lasix may discharge patient   once cleared by PT/OT .

## 2025-01-23 ENCOUNTER — APPOINTMENT (OUTPATIENT)
Dept: ORTHOPEDIC SURGERY | Facility: CLINIC | Age: 89
End: 2025-01-23
Payer: MEDICARE

## 2025-01-23 DIAGNOSIS — Z96.642 PRESENCE OF LEFT ARTIFICIAL HIP JOINT: ICD-10-CM

## 2025-01-23 PROCEDURE — 99024 POSTOP FOLLOW-UP VISIT: CPT

## 2025-01-23 PROCEDURE — 73502 X-RAY EXAM HIP UNI 2-3 VIEWS: CPT

## 2025-02-13 ENCOUNTER — APPOINTMENT (OUTPATIENT)
Dept: ORTHOPEDIC SURGERY | Facility: CLINIC | Age: 89
End: 2025-02-13

## 2025-02-18 ENCOUNTER — APPOINTMENT (OUTPATIENT)
Dept: ORTHOPEDIC SURGERY | Facility: CLINIC | Age: 89
End: 2025-02-18
Payer: MEDICARE

## 2025-02-18 DIAGNOSIS — Z96.642 PRESENCE OF LEFT ARTIFICIAL HIP JOINT: ICD-10-CM

## 2025-02-18 PROCEDURE — 73502 X-RAY EXAM HIP UNI 2-3 VIEWS: CPT

## 2025-02-18 PROCEDURE — 99024 POSTOP FOLLOW-UP VISIT: CPT

## 2025-02-24 ENCOUNTER — OUTPATIENT (OUTPATIENT)
Dept: OUTPATIENT SERVICES | Facility: HOSPITAL | Age: 89
LOS: 1 days | End: 2025-02-24
Payer: MEDICARE

## 2025-02-24 ENCOUNTER — APPOINTMENT (OUTPATIENT)
Dept: RADIOLOGY | Facility: CLINIC | Age: 89
End: 2025-02-24
Payer: MEDICARE

## 2025-02-24 DIAGNOSIS — Z00.00 ENCOUNTER FOR GENERAL ADULT MEDICAL EXAMINATION WITHOUT ABNORMAL FINDINGS: ICD-10-CM

## 2025-02-24 DIAGNOSIS — Z90.710 ACQUIRED ABSENCE OF BOTH CERVIX AND UTERUS: Chronic | ICD-10-CM

## 2025-02-24 DIAGNOSIS — Z98.890 OTHER SPECIFIED POSTPROCEDURAL STATES: Chronic | ICD-10-CM

## 2025-02-24 DIAGNOSIS — Z90.89 ACQUIRED ABSENCE OF OTHER ORGANS: Chronic | ICD-10-CM

## 2025-02-24 DIAGNOSIS — Z90.49 ACQUIRED ABSENCE OF OTHER SPECIFIED PARTS OF DIGESTIVE TRACT: Chronic | ICD-10-CM

## 2025-02-24 DIAGNOSIS — Z98.89 OTHER SPECIFIED POSTPROCEDURAL STATES: Chronic | ICD-10-CM

## 2025-02-24 PROCEDURE — 71046 X-RAY EXAM CHEST 2 VIEWS: CPT

## 2025-02-24 PROCEDURE — 71046 X-RAY EXAM CHEST 2 VIEWS: CPT | Mod: 26

## 2025-03-03 ENCOUNTER — OFFICE (OUTPATIENT)
Dept: URBAN - METROPOLITAN AREA CLINIC 115 | Facility: CLINIC | Age: OVER 89
Setting detail: OPHTHALMOLOGY
End: 2025-03-03
Payer: MEDICARE

## 2025-03-03 DIAGNOSIS — H26.493: ICD-10-CM

## 2025-03-03 DIAGNOSIS — H04.122: ICD-10-CM

## 2025-03-03 DIAGNOSIS — H35.032: ICD-10-CM

## 2025-03-03 DIAGNOSIS — H35.031: ICD-10-CM

## 2025-03-03 DIAGNOSIS — H02.135: ICD-10-CM

## 2025-03-03 DIAGNOSIS — H02.132: ICD-10-CM

## 2025-03-03 DIAGNOSIS — Z96.1: ICD-10-CM

## 2025-03-03 DIAGNOSIS — H01.014: ICD-10-CM

## 2025-03-03 DIAGNOSIS — H01.011: ICD-10-CM

## 2025-03-03 DIAGNOSIS — H04.121: ICD-10-CM

## 2025-03-03 PROCEDURE — 83861 MICROFLUID ANALY TEARS: CPT | Mod: QW,LT | Performed by: OPHTHALMOLOGY

## 2025-03-03 PROCEDURE — 99213 OFFICE O/P EST LOW 20 MIN: CPT | Performed by: OPHTHALMOLOGY

## 2025-03-03 PROCEDURE — 83861 MICROFLUID ANALY TEARS: CPT | Mod: QW,RT | Performed by: OPHTHALMOLOGY

## 2025-03-03 ASSESSMENT — REFRACTION_AUTOREFRACTION
OD_SPHERE: +1.50
OS_AXIS: 089
OS_CYLINDER: -2.00
OS_SPHERE: +2.00
OD_AXIS: 075
OD_CYLINDER: -2.50

## 2025-03-03 ASSESSMENT — REFRACTION_CURRENTRX
OS_AXIS: 087
OD_OVR_VA: 20/
OS_CYLINDER: -0.75
OD_AXIS: 096
OS_SPHERE: +4.00
OD_VPRISM_DIRECTION: SV
OD_SPHERE: +4.25
OD_CYLINDER: -0.25
OS_OVR_VA: 20/

## 2025-03-03 ASSESSMENT — SUPERFICIAL PUNCTATE KERATITIS (SPK)
OD_SPK: 1+
OS_SPK: 1+

## 2025-03-03 ASSESSMENT — LID EXAM ASSESSMENTS
OS_BLEPHARITIS: LLL LUL 1+
OD_BLEPHARITIS: RLL RUL 1+

## 2025-03-03 ASSESSMENT — LID POSITION - ECTROPION
OD_ECTROPION: RLL T
OS_ECTROPION: LLL T

## 2025-03-03 ASSESSMENT — CONFRONTATIONAL VISUAL FIELD TEST (CVF)
OD_FINDINGS: FULL
OS_FINDINGS: FULL

## 2025-03-03 ASSESSMENT — TONOMETRY
OS_IOP_MMHG: 18
OD_IOP_MMHG: 16

## 2025-03-03 ASSESSMENT — VISUAL ACUITY
OS_BCVA: 20/80
OD_BCVA: 20/60-1

## 2025-03-27 ENCOUNTER — APPOINTMENT (OUTPATIENT)
Dept: ORTHOPEDIC SURGERY | Facility: CLINIC | Age: 89
End: 2025-03-27
Payer: MEDICARE

## 2025-03-27 DIAGNOSIS — Z96.642 PRESENCE OF LEFT ARTIFICIAL HIP JOINT: ICD-10-CM

## 2025-03-27 PROCEDURE — 73502 X-RAY EXAM HIP UNI 2-3 VIEWS: CPT

## 2025-03-27 PROCEDURE — 99024 POSTOP FOLLOW-UP VISIT: CPT

## 2025-04-07 NOTE — ED PROVIDER NOTE - CPE EDP PSYCH NORM
Patient arrived for PT this date stating symptoms much improved with current home program.  Verbally reviewed exercises and education from evaluation.  Mutually decided to place PT on 4 week home program trial.  Patient encouraged to contact clinic if any questions or exacerbation of symptoms.  Oswestry score much improved.  If doing well at 4 weeks, then will discharge from PT at that time.  Patient in agreement with plan.  No treatment billed this date as treatment <7 minutes in length.   normal...

## 2025-05-08 NOTE — ED ADULT NURSE NOTE - PMH
Spoke with mom via  ID# [id number] in regards to scheduling the 1 year follow up with coordinated renal US. Rn able to schedule at [facility] location for July. Provided mom with the address and directions to the clinic. No further questions at this time.  ZA 5.8  6204    Afib    GERD (gastroesophageal reflux disease)    Hypertension    Pacemaker

## 2025-05-19 ENCOUNTER — INPATIENT (INPATIENT)
Facility: HOSPITAL | Age: 89
LOS: 2 days | Discharge: HOME | DRG: 192 | End: 2025-05-22
Attending: STUDENT IN AN ORGANIZED HEALTH CARE EDUCATION/TRAINING PROGRAM | Admitting: HOSPITALIST
Payer: MEDICARE

## 2025-05-19 VITALS
DIASTOLIC BLOOD PRESSURE: 96 MMHG | TEMPERATURE: 98 F | WEIGHT: 125 LBS | HEART RATE: 109 BPM | RESPIRATION RATE: 18 BRPM | OXYGEN SATURATION: 97 % | SYSTOLIC BLOOD PRESSURE: 176 MMHG

## 2025-05-19 DIAGNOSIS — Z98.890 OTHER SPECIFIED POSTPROCEDURAL STATES: Chronic | ICD-10-CM

## 2025-05-19 DIAGNOSIS — J44.1 CHRONIC OBSTRUCTIVE PULMONARY DISEASE WITH (ACUTE) EXACERBATION: ICD-10-CM

## 2025-05-19 DIAGNOSIS — Z90.710 ACQUIRED ABSENCE OF BOTH CERVIX AND UTERUS: Chronic | ICD-10-CM

## 2025-05-19 DIAGNOSIS — Z90.49 ACQUIRED ABSENCE OF OTHER SPECIFIED PARTS OF DIGESTIVE TRACT: Chronic | ICD-10-CM

## 2025-05-19 DIAGNOSIS — Z90.89 ACQUIRED ABSENCE OF OTHER ORGANS: Chronic | ICD-10-CM

## 2025-05-19 DIAGNOSIS — Z98.89 OTHER SPECIFIED POSTPROCEDURAL STATES: Chronic | ICD-10-CM

## 2025-05-19 LAB
ALBUMIN SERPL ELPH-MCNC: 4.2 G/DL — SIGNIFICANT CHANGE UP (ref 3.3–5.2)
ALP SERPL-CCNC: 75 U/L — SIGNIFICANT CHANGE UP (ref 40–120)
ALT FLD-CCNC: 14 U/L — SIGNIFICANT CHANGE UP
ANION GAP SERPL CALC-SCNC: 14 MMOL/L — SIGNIFICANT CHANGE UP (ref 5–17)
APTT BLD: 36.7 SEC — SIGNIFICANT CHANGE UP (ref 26.1–36.8)
AST SERPL-CCNC: 32 U/L — HIGH
BASOPHILS # BLD AUTO: 0.07 K/UL — SIGNIFICANT CHANGE UP (ref 0–0.2)
BASOPHILS NFR BLD AUTO: 1.1 % — SIGNIFICANT CHANGE UP (ref 0–2)
BILIRUB SERPL-MCNC: 0.7 MG/DL — SIGNIFICANT CHANGE UP (ref 0.4–2)
BUN SERPL-MCNC: 9.6 MG/DL — SIGNIFICANT CHANGE UP (ref 8–20)
CALCIUM SERPL-MCNC: 8.7 MG/DL — SIGNIFICANT CHANGE UP (ref 8.4–10.5)
CHLORIDE SERPL-SCNC: 93 MMOL/L — LOW (ref 96–108)
CO2 SERPL-SCNC: 20 MMOL/L — LOW (ref 22–29)
CREAT SERPL-MCNC: 0.8 MG/DL — SIGNIFICANT CHANGE UP (ref 0.5–1.3)
EGFR: 68 ML/MIN/1.73M2 — SIGNIFICANT CHANGE UP
EGFR: 68 ML/MIN/1.73M2 — SIGNIFICANT CHANGE UP
EOSINOPHIL # BLD AUTO: 0.18 K/UL — SIGNIFICANT CHANGE UP (ref 0–0.5)
EOSINOPHIL NFR BLD AUTO: 2.8 % — SIGNIFICANT CHANGE UP (ref 0–6)
GLUCOSE SERPL-MCNC: 119 MG/DL — HIGH (ref 70–99)
HCT VFR BLD CALC: 31.8 % — LOW (ref 34.5–45)
HGB BLD-MCNC: 10.4 G/DL — LOW (ref 11.5–15.5)
IMM GRANULOCYTES # BLD AUTO: 0.01 K/UL — SIGNIFICANT CHANGE UP (ref 0–0.07)
IMM GRANULOCYTES NFR BLD AUTO: 0.2 % — SIGNIFICANT CHANGE UP (ref 0–0.9)
INR BLD: 1.39 RATIO — HIGH (ref 0.85–1.16)
LACTATE BLDV-MCNC: 0.9 MMOL/L — SIGNIFICANT CHANGE UP (ref 0.5–2)
LYMPHOCYTES # BLD AUTO: 1.83 K/UL — SIGNIFICANT CHANGE UP (ref 1–3.3)
LYMPHOCYTES NFR BLD AUTO: 28.3 % — SIGNIFICANT CHANGE UP (ref 13–44)
MCHC RBC-ENTMCNC: 32.6 PG — SIGNIFICANT CHANGE UP (ref 27–34)
MCHC RBC-ENTMCNC: 32.7 G/DL — SIGNIFICANT CHANGE UP (ref 32–36)
MCV RBC AUTO: 99.7 FL — SIGNIFICANT CHANGE UP (ref 80–100)
MONOCYTES # BLD AUTO: 0.6 K/UL — SIGNIFICANT CHANGE UP (ref 0–0.9)
MONOCYTES NFR BLD AUTO: 9.3 % — SIGNIFICANT CHANGE UP (ref 2–14)
NEUTROPHILS # BLD AUTO: 3.77 K/UL — SIGNIFICANT CHANGE UP (ref 1.8–7.4)
NEUTROPHILS NFR BLD AUTO: 58.3 % — SIGNIFICANT CHANGE UP (ref 43–77)
NRBC # BLD AUTO: 0 K/UL — SIGNIFICANT CHANGE UP (ref 0–0)
NRBC # FLD: 0 K/UL — SIGNIFICANT CHANGE UP (ref 0–0)
NRBC BLD AUTO-RTO: 0 /100 WBCS — SIGNIFICANT CHANGE UP (ref 0–0)
NT-PROBNP SERPL-SCNC: 1251 PG/ML — HIGH (ref 0–300)
PLATELET # BLD AUTO: 159 K/UL — SIGNIFICANT CHANGE UP (ref 150–400)
PMV BLD: 10.3 FL — SIGNIFICANT CHANGE UP (ref 7–13)
POTASSIUM SERPL-MCNC: 4.5 MMOL/L — SIGNIFICANT CHANGE UP (ref 3.5–5.3)
POTASSIUM SERPL-SCNC: 4.5 MMOL/L — SIGNIFICANT CHANGE UP (ref 3.5–5.3)
PROT SERPL-MCNC: 7.6 G/DL — SIGNIFICANT CHANGE UP (ref 6.6–8.7)
PROTHROM AB SERPL-ACNC: 16.1 SEC — HIGH (ref 9.9–13.4)
RAPID RVP RESULT: SIGNIFICANT CHANGE UP
RBC # BLD: 3.19 M/UL — LOW (ref 3.8–5.2)
RBC # FLD: 14.4 % — SIGNIFICANT CHANGE UP (ref 10.3–14.5)
SARS-COV-2 RNA SPEC QL NAA+PROBE: SIGNIFICANT CHANGE UP
SODIUM SERPL-SCNC: 127 MMOL/L — LOW (ref 135–145)
TROPONIN T, HIGH SENSITIVITY RESULT: 27 NG/L — SIGNIFICANT CHANGE UP (ref 0–51)
TROPONIN T, HIGH SENSITIVITY RESULT: 30 NG/L — SIGNIFICANT CHANGE UP (ref 0–51)
WBC # BLD: 6.46 K/UL — SIGNIFICANT CHANGE UP (ref 3.8–10.5)
WBC # FLD AUTO: 6.46 K/UL — SIGNIFICANT CHANGE UP (ref 3.8–10.5)

## 2025-05-19 PROCEDURE — 99291 CRITICAL CARE FIRST HOUR: CPT | Mod: GC

## 2025-05-19 PROCEDURE — 71045 X-RAY EXAM CHEST 1 VIEW: CPT | Mod: 26

## 2025-05-19 PROCEDURE — 99223 1ST HOSP IP/OBS HIGH 75: CPT | Mod: GC

## 2025-05-19 PROCEDURE — 93010 ELECTROCARDIOGRAM REPORT: CPT

## 2025-05-19 RX ORDER — ONDANSETRON HCL/PF 4 MG/2 ML
4 VIAL (ML) INJECTION EVERY 8 HOURS
Refills: 0 | Status: DISCONTINUED | OUTPATIENT
Start: 2025-05-19 | End: 2025-05-22

## 2025-05-19 RX ORDER — MAGNESIUM, ALUMINUM HYDROXIDE 200-200 MG
30 TABLET,CHEWABLE ORAL EVERY 4 HOURS
Refills: 0 | Status: DISCONTINUED | OUTPATIENT
Start: 2025-05-19 | End: 2025-05-22

## 2025-05-19 RX ORDER — LANOLIN/MINERAL OIL/PETROLATUM
1 OINTMENT (GRAM) OPHTHALMIC (EYE) EVERY 4 HOURS
Refills: 0 | Status: DISCONTINUED | OUTPATIENT
Start: 2025-05-19 | End: 2025-05-22

## 2025-05-19 RX ORDER — SENNA 187 MG
2 TABLET ORAL AT BEDTIME
Refills: 0 | Status: DISCONTINUED | OUTPATIENT
Start: 2025-05-19 | End: 2025-05-22

## 2025-05-19 RX ORDER — ACETAMINOPHEN 500 MG/5ML
650 LIQUID (ML) ORAL EVERY 6 HOURS
Refills: 0 | Status: DISCONTINUED | OUTPATIENT
Start: 2025-05-19 | End: 2025-05-22

## 2025-05-19 RX ORDER — METHYLPREDNISOLONE ACETATE 80 MG/ML
125 INJECTION, SUSPENSION INTRA-ARTICULAR; INTRALESIONAL; INTRAMUSCULAR; SOFT TISSUE ONCE
Refills: 0 | Status: COMPLETED | OUTPATIENT
Start: 2025-05-19 | End: 2025-05-19

## 2025-05-19 RX ORDER — B1/B2/B3/B5/B6/B12/VIT C/FOLIC 500-0.5 MG
1 TABLET ORAL DAILY
Refills: 0 | Status: DISCONTINUED | OUTPATIENT
Start: 2025-05-19 | End: 2025-05-22

## 2025-05-19 RX ORDER — IPRATROPIUM BROMIDE AND ALBUTEROL SULFATE .5; 2.5 MG/3ML; MG/3ML
3 SOLUTION RESPIRATORY (INHALATION) ONCE
Refills: 0 | Status: COMPLETED | OUTPATIENT
Start: 2025-05-19 | End: 2025-05-19

## 2025-05-19 RX ORDER — APIXABAN 2.5 MG/1
2.5 TABLET, FILM COATED ORAL EVERY 12 HOURS
Refills: 0 | Status: DISCONTINUED | OUTPATIENT
Start: 2025-05-19 | End: 2025-05-22

## 2025-05-19 RX ORDER — BISACODYL 5 MG
5 TABLET, DELAYED RELEASE (ENTERIC COATED) ORAL EVERY 12 HOURS
Refills: 0 | Status: DISCONTINUED | OUTPATIENT
Start: 2025-05-19 | End: 2025-05-22

## 2025-05-19 RX ORDER — MELATONIN 5 MG
3 TABLET ORAL AT BEDTIME
Refills: 0 | Status: DISCONTINUED | OUTPATIENT
Start: 2025-05-19 | End: 2025-05-22

## 2025-05-19 RX ORDER — GABAPENTIN 400 MG/1
300 CAPSULE ORAL AT BEDTIME
Refills: 0 | Status: DISCONTINUED | OUTPATIENT
Start: 2025-05-19 | End: 2025-05-22

## 2025-05-19 RX ORDER — LEVOTHYROXINE SODIUM 300 MCG
50 TABLET ORAL DAILY
Refills: 0 | Status: DISCONTINUED | OUTPATIENT
Start: 2025-05-19 | End: 2025-05-22

## 2025-05-19 RX ADMIN — Medication 1 DROP(S): at 22:29

## 2025-05-19 RX ADMIN — METHYLPREDNISOLONE ACETATE 125 MILLIGRAM(S): 80 INJECTION, SUSPENSION INTRA-ARTICULAR; INTRALESIONAL; INTRAMUSCULAR; SOFT TISSUE at 19:50

## 2025-05-19 RX ADMIN — IPRATROPIUM BROMIDE AND ALBUTEROL SULFATE 3 MILLILITER(S): .5; 2.5 SOLUTION RESPIRATORY (INHALATION) at 19:50

## 2025-05-19 NOTE — ED PROVIDER NOTE - OBJECTIVE STATEMENT
89-year-old female with past medical history significant for hypertension, A-fib (on Eliquis), hypothyroidism ED c/o shortness of breath.  Patient states starting this morning she was having progressively worsening shortness of breath.  Patient states there was no acute event that initiated the acute respiratory distress.  Patient is also endorsing dizziness but states that this is a chronic issue that she has followed up outpatient with.  Patient denies recent fevers, sick contact or chest pain. Patient denies chills, nausea, vomiting, diarrhea, constipation, headache, weakness, dysuria, hematuria.

## 2025-05-19 NOTE — H&P ADULT - NSHPPHYSICALEXAM_GEN_ALL_CORE
GENERAL: NAD, lying in bed comfortably  HEAD:  Atraumatic, Normocephalic  EYES: EOMI, PERRLA, conjunctiva and sclera clear  ENT: Moist mucous membranes  NECK: Supple, No JVD  CHEST/LUNG: Clear to auscultation bilaterally; Diminished b/l. Unlabored  HEART: Regular rate and rhythm; No murmurs, rubs, or gallops  ABDOMEN: BSx4; Soft, nontender, nondistended  EXTREMITIES:  2+ Peripheral Pulses, brisk capillary refill. Mild edema b/l up to low calf  NERVOUS SYSTEM:  A&Ox3, no focal deficits   SKIN: No rashes or lesions Vital Signs Last 24 Hrs  T(C): 36.6 (20 May 2025 00:29), Max: 37.1 (19 May 2025 19:45)  T(F): 97.8 (20 May 2025 00:29), Max: 98.7 (19 May 2025 19:45)  HR: 104 (20 May 2025 00:29) (104 - 110)  BP: 157/78 (20 May 2025 00:29) (117/96 - 176/96)  BP(mean): --  RR: 18 (20 May 2025 00:29) (18 - 24)  SpO2: 95% (20 May 2025 00:29) (95% - 100%)    Parameters below as of 20 May 2025 00:29  Patient On (Oxygen Delivery Method): nasal cannula  O2 Flow (L/min): 1    GENERAL: NAD, lying in bed comfortably  HEAD:  Atraumatic, Normocephalic  EYES: EOMI, PERRLA, conjunctiva and sclera clear  ENT: Moist mucous membranes  NECK: Supple, No JVD  CHEST/LUNG: Clear to auscultation bilaterally; Diminished b/l. Unlabored  HEART: Regular rate and rhythm; No murmurs, rubs, or gallops  ABDOMEN: BSx4; Soft, nontender, nondistended  EXTREMITIES:  2+ Peripheral Pulses, brisk capillary refill. Mild edema b/l up to low calf  NERVOUS SYSTEM:  A&Ox3, no focal deficits   SKIN: No rashes or lesions

## 2025-05-19 NOTE — H&P ADULT - ATTENDING COMMENTS
Patient seen, case discussed with ED staff, Medical Resident, Patient with acute asthma exacerbation while at home. No travel/sick contacts. No hx of aspiration. No fever/chills. Patient with improvement in hypoxia/wheezing in ED. Afib with rvr due to nebs, anxiety, resp. distress. No evidence of CHF or PNA. RVP neg. Cont. with above regimen as outlined above. GOC done and Patient full code.

## 2025-05-19 NOTE — H&P ADULT - HISTORY OF PRESENT ILLNESS
89 yo F hx Afib on Eliquis and s/p pacemaker, ?COPD, anemia, HTN, hyponatremia, hypothyroidism, L hip surgeries (most recently 1/2025), BIBA for acute shortness of breath today accompanied by nausea and dizziness. Pt states she had a large meal and then was resting and suddenly felt short of breath and dizzy. She denies passing out but states she had difficulty ambulating despite her regular walker. Also reports nausea but no vomiting and states that has resolved. She states the shortness of breath was constant and only improved once she was given supplemental O2. She reports milder episodes in past months but they spontaneously resolved. She reports following with pulm Dr. Reardon and an old diagnosis of COPD with inhalers but has not used anything in years. She denies smoking history. She also follows with cardiology for Afib/CHF/pacemaker and was recently cleared by them for dental surgery (tooth was removed due to ache and son at bedside states she received antibiotics for an abscess). She has not been going to physical therapy for the recent hip surgery, but is able to ambulate with the walker at home.  at bedside assists with medications and care. Pt endorses episodes of palpitations, attributing them to the Afib. She endorses feeling cold all the time. Pt denies fevers, chills, syncope, cough, CP, diarrhea, constipation, abd pain, bleeding, LE edema. She reports good adherence to medications including eliquis. No new allergies 89 yo F hx Afib on Eliquis and s/p pacemaker, ?COPD, anemia, HTN, hyponatremia, hypothyroidism, L hip surgeries (most recently 1/2025), BIBA for acute shortness of breath today accompanied by nausea and dizziness. Pt states she had a large meal and then was resting and suddenly felt short of breath and dizzy. She denies passing out but states she had difficulty ambulating despite her regular walker. Also reports nausea but no vomiting and states that has resolved. She states the shortness of breath was constant and only improved once she was given supplemental O2. She reports milder episodes in past months but they spontaneously resolved. She reports following with pulm Dr. Reardon and an old diagnosis of COPD with inhalers but has not used anything in years. She denies smoking history. She also follows with cardiology for Afib/CHF/pacemaker and was recently cleared by them for dental surgery (tooth was removed due to ache and son at bedside states she received antibiotics for an abscess). She has not been going to physical therapy for the recent hip surgery, but is able to ambulate with the walker at home.  at bedside assists with medications and care. Pt endorses episodes of palpitations, attributing them to the Afib. She endorses feeling cold all the time. Pt denies fevers, chills, syncope, cough, CP, diarrhea, constipation, abd pain, bleeding, LE edema. She reports good adherence to medications including Eliquis No new allergies

## 2025-05-19 NOTE — H&P ADULT - CONVERSATION DETAILS
Discussed plan for admission for treatment of asthma exacerbation/bronchospasm. Patient with advanced age, but functional with /Daughter oversight. Family wants any and all measures taken with regard to treatment and CPR. Patient is to remain full code at this time.

## 2025-05-19 NOTE — ED PROVIDER NOTE - PHYSICAL EXAMINATION
General: Awake, alert, lying in bed in mild distress  HEENT: Normocephalic, atraumatic. No scleral icterus or conjunctival injection. EOMI. Pupils 2mm b/l and reactive.   Cardiac: +A-fib, +tachycardic, S1/S2 present  Resp: +diffuse wheezing in upper and lower lobes. Symmetric chest expansion with inspiration. No accessory muscle use  Abd: Soft, non-tender, non-distended. No guarding, rebound, or rigidity.  Skin: No rashes, abrasions, or lacerations.  Extremities: No LE edema bilaterally.   Neuro: AO x 4. Moves all extremities symmetrically. Motor strength and sensation grossly intact.  Psych: Appropriate mood and affect

## 2025-05-19 NOTE — ED ADULT NURSE NOTE - NSFALLHARMRISKINTERV_ED_ALL_ED

## 2025-05-19 NOTE — H&P ADULT - TIME BILLING
Reviewing prior medical record, ED course, reviewing labs/imaging studies, discussing case with ED attending, examining patient, furnishing H&P, orders, doing medication reconciliation, discussing plan of care with ED staff/family and answering all their questions with exception of time spent teaching.

## 2025-05-19 NOTE — ED ADULT NURSE NOTE - OBJECTIVE STATEMENT
89 yo F p/w c/o SOB. States SOB began this morning. Tachypneic on arrival, mild use of accessory muscles noted. Sinus tach on CM (103). EKG completed and given to attending MD. Maintained on CM and  for cts monitoring. MD at bedside for eval. Comfort measures provided, safety measures implemented, call bell in reach.

## 2025-05-19 NOTE — ED ADULT TRIAGE NOTE - PATIENT ON (OXYGEN DELIVERY METHOD)
SUBJECTIVE:   CC: Samra Mejia is an 38 year old woman who presents for preventive health visit.       Patient has been advised of split billing requirements and indicates understanding: Yes  Healthy Habits:     Getting at least 3 servings of Calcium per day:  NO    Bi-annual eye exam:  NO    Dental care twice a year:  Yes    Sleep apnea or symptoms of sleep apnea:  Daytime drowsiness    Diet:  Regular (no restrictions)    Frequency of exercise:  2-3 days/week    Duration of exercise:  N/A    Taking medications regularly:  Yes    Medication side effects:  Not applicable    PHQ-2 Total Score: 0    Additional concerns today:  No    Low back pain, seeing a chiropractor. Not getting better.       Today's PHQ-2 Score:   PHQ-2 ( 1999 [employer]) 11/9/2021   Q1: Little interest or pleasure in doing things 0   Q2: Feeling down, depressed or hopeless 0   PHQ-2 Score 0   Q1: Little interest or pleasure in doing things Not at all   Q2: Feeling down, depressed or hopeless Not at all   PHQ-2 Score 0       Abuse: Current or Past (Physical, Sexual or Emotional) - No  Do you feel safe in your environment? Yes    Have you ever done Advance Care Planning? (For example, a Health Directive, POLST, or a discussion with a medical provider or your loved ones about your wishes): No, advance care planning information given to patient to review.  Patient declined advance care planning discussion at this time.    Social History     Tobacco Use     Smoking status: Never Smoker     Smokeless tobacco: Never Used   Substance Use Topics     Alcohol use: Yes     Alcohol/week: 1.0 standard drink     Types: 1 Standard drinks or equivalent per week     Comment: Alcoholic Drinks/day: occassional         Alcohol Use 11/9/2021   Prescreen: >3 drinks/day or >7 drinks/week? No       Reviewed orders with patient.  Reviewed health maintenance and updated orders accordingly - Yes  Labs reviewed in EPIC    Breast Cancer Screening:  Any new diagnosis of  family breast, ovarian, or bowel cancer? No    FHS-7:   Breast CA Risk Assessment (FHS-7) 11/9/2021   Did any of your first-degree relatives have breast or ovarian cancer? Yes   Did any of your relatives have bilateral breast cancer? No   Did any man in your family have breast cancer? No   Did any woman in your family have breast and ovarian cancer? No   Did any woman in your family have breast cancer before age 50 y? No   Do you have 2 or more relatives with breast and/or ovarian cancer? No   Do you have 2 or more relatives with breast and/or bowel cancer? No     Patient under 40 years of age: Routine Mammogram Screening not recommended.   Pertinent mammograms are reviewed under the imaging tab.    History of abnormal Pap smear: NO - age 30-65 PAP every 5 years with negative HPV co-testing recommended     Reviewed and updated as needed this visit by clinical staff  Tobacco  Allergies    Med Hx  Surg Hx  Fam Hx  Soc Hx       Reviewed and updated as needed this visit by Provider                   Review of Systems   Constitutional: Negative for chills and fever.   HENT: Negative for congestion, ear pain, hearing loss and sore throat.    Eyes: Negative for pain and visual disturbance.   Respiratory: Negative for cough and shortness of breath.    Cardiovascular: Negative for chest pain, palpitations and peripheral edema.   Gastrointestinal: Positive for constipation. Negative for abdominal pain, diarrhea, heartburn, hematochezia and nausea.   Breasts:  Negative for tenderness, breast mass and discharge.   Genitourinary: Negative for dysuria, frequency, genital sores, hematuria, pelvic pain, urgency, vaginal bleeding and vaginal discharge.   Musculoskeletal: Positive for arthralgias. Negative for joint swelling and myalgias.   Skin: Negative for rash.   Neurological: Positive for headaches. Negative for dizziness, weakness and paresthesias.   Psychiatric/Behavioral: Negative for mood changes. The patient is not  "nervous/anxious.         OBJECTIVE:   /70 (BP Location: Right arm, Patient Position: Sitting, Cuff Size: Adult Regular)   Pulse 66   Temp 97.5  F (36.4  C) (Tympanic)   Resp 18   Ht 1.651 m (5' 5\")   Wt 83.1 kg (183 lb 3.2 oz)   SpO2 100%   BMI 30.49 kg/m    Physical Exam  GENERAL: healthy, alert and no distress  EYES: Eyes grossly normal to inspection, PERRL and conjunctivae and sclerae normal  NECK: no adenopathy, no asymmetry, masses, or scars and thyroid normal to palpation  RESP: lungs clear to auscultation - no rales, rhonchi or wheezes  CV: regular rate and rhythm, normal S1 S2, no S3 or S4, no murmur, click or rub, no peripheral edema and peripheral pulses strong  ABDOMEN: soft, nontender, no hepatosplenomegaly, no masses and bowel sounds normal  MS: no gross musculoskeletal defects noted, no edema  SKIN: no suspicious lesions or rashes  NEURO: Normal strength and tone, mentation intact and speech normal  PSYCH: mentation appears normal, affect normal/bright    Diagnostic Test Results:  Labs reviewed in Epic  Results for orders placed or performed in visit on 11/09/21   Hepatitis C Screen Reflex to HCV RNA Quant and Genotype     Status: Normal   Result Value Ref Range    Hepatitis C Antibody Nonreactive Nonreactive    Narrative    Assay performance characteristics have not been established for newborns, infants, and children.   Vitamin D Deficiency     Status: Abnormal   Result Value Ref Range    Vitamin D, Total (25-Hydroxy) 19 (L) 20 - 75 ug/L    Narrative    Season, race, dietary intake, and treatment affect the concentration of 25-hydroxy-Vitamin D. Values may decrease during winter months and increase during summer months. Values 20-29 ug/L may indicate Vitamin D insufficiency and values <20 ug/L may indicate Vitamin D deficiency.    Vitamin D determination is routinely performed by an immunoassay specific for 25 hydroxyvitamin D3.  If an individual is on vitamin D2(ergocalciferol) " supplementation, please specify 25 OH vitamin D2 and D3 level determination by LCMSMS test [id number].     Lipid panel reflex to direct LDL Fasting     Status: Abnormal   Result Value Ref Range    Cholesterol 194 <200 mg/dL    Triglycerides 65 <150 mg/dL    Direct Measure HDL 59 >=50 mg/dL    LDL Cholesterol Calculated 122 (H) <=100 mg/dL    Non HDL Cholesterol 135 (H) <130 mg/dL    Patient Fasting > 8hrs? No     Narrative    Cholesterol  Desirable:  <200 mg/dL    Triglycerides  Normal:  Less than 150 mg/dL  Borderline High:  150-199 mg/dL  High:  200-499 mg/dL  Very High:  Greater than or equal to 500 mg/dL    Direct Measure HDL  Female:  Greater than or equal to 50 mg/dL   Male:  Greater than or equal to 40 mg/dL    LDL Cholesterol  Desirable:  <100mg/dL  Above Desirable:  100-129 mg/dL   Borderline High:  130-159 mg/dL   High:  160-189 mg/dL   Very High:  >= 190 mg/dL    Non HDL Cholesterol  Desirable:  130 mg/dL  Above Desirable:  130-159 mg/dL  Borderline High:  160-189 mg/dL  High:  190-219 mg/dL  Very High:  Greater than or equal to 220 mg/dL   Hemoglobin A1c     Status: Normal   Result Value Ref Range    Hemoglobin A1C 5.3 0.0 - 5.6 %   TSH with free T4 reflex     Status: Normal   Result Value Ref Range    TSH 0.59 0.40 - 4.00 mU/L   UA macro with reflex to Microscopic and Culture - Clinc Collect     Status: Abnormal    Specimen: Urine, Clean Catch   Result Value Ref Range    Color Urine Yellow Colorless, Straw, Light Yellow, Yellow    Appearance Urine Clear Clear    Glucose Urine Negative Negative mg/dL    Bilirubin Urine Negative Negative    Ketones Urine 40  (A) Negative mg/dL    Specific Gravity Urine >=1.030 1.003 - 1.035    Blood Urine Small (A) Negative    pH Urine 5.0 5.0 - 7.0    Protein Albumin Urine Negative Negative mg/dL    Urobilinogen Urine 0.2 0.2, 1.0 E.U./dL    Nitrite Urine Negative Negative    Leukocyte Esterase Urine Negative Negative   Urine Microscopic     Status: Abnormal   Result Value  "Ref Range    RBC Urine 0-2 0-2 /HPF /HPF    WBC Urine None Seen 0-5 /HPF /HPF    Squamous Epithelials Urine Few (A) None Seen /LPF    Narrative    Urine Culture not indicated       ASSESSMENT/PLAN:       ICD-10-CM    1. Routine general medical examination at a health care facility  Z00.00    2. Fatigue, unspecified type  R53.83 Vitamin D Deficiency     TSH with free T4 reflex     Vitamin D Deficiency     TSH with free T4 reflex   3. Need for hepatitis C screening test  Z11.59 Hepatitis C Screen Reflex to HCV RNA Quant and Genotype     Hepatitis C Screen Reflex to HCV RNA Quant and Genotype   4. Screening cholesterol level  Z13.220 Lipid panel reflex to direct LDL Fasting     Lipid panel reflex to direct LDL Fasting   5. Screening for diabetes mellitus  Z13.1 Hemoglobin A1c     Hemoglobin A1c     UA macro with reflex to Microscopic and Culture - Clinc Collect     Urine Microscopic   6. Need for diphtheria-tetanus-pertussis (Tdap) vaccine  Z23 TDAP VACCINE (Adacel, Boostrix)  [[id number]]       Patient has been advised of split billing requirements and indicates understanding: No  COUNSELING:  Reviewed preventive health counseling, as reflected in patient instructions    Estimated body mass index is 30.49 kg/m  as calculated from the following:    Height as of this encounter: 1.651 m (5' 5\").    Weight as of this encounter: 83.1 kg (183 lb 3.2 oz).    Weight management plan: Discussed healthy diet and exercise guidelines    She reports that she has never smoked. She has never used smokeless tobacco.      Counseling Resources:  ATP IV Guidelines  Pooled Cohorts Equation Calculator  Breast Cancer Risk Calculator  BRCA-Related Cancer Risk Assessment: FHS-7 Tool  FRAX Risk Assessment  ICSI Preventive Guidelines  Dietary Guidelines for Americans, 2010  USDA's MyPlate  ASA Prophylaxis  Lung CA Screening    Sheela De La O MD  [facility]    " room air

## 2025-05-19 NOTE — ED ADULT TRIAGE NOTE - CHIEF COMPLAINT QUOTE
BIBA from home for SOB that started 1 hour PTA talking in fragmented sentences, denies CP. No meds given PTA. 18G left AC. Pt 90% on RA, placed on 4L NC with improvement

## 2025-05-19 NOTE — H&P ADULT - ASSESSMENT
89 yo F hx Afib on Eliquis and s/p pacemaker, ?COPD, anemia, HTN, hyponatremia, hypothyroidism, L hip surgeries (most recently 1/2025), BIBA for acute shortness of breath today, accompanied by dizziness and nausea, found to have acute hypoxic respiratory failure likely 2/2 PNA vs COPD vs CHF exacerbation.    PLAN    # AHRF  # Dyspnea  # ?COPD exacerbation  # ?CHF exacerbation  # Pleural effusion b/l  - 90% on RA in ED, not on home O2. improved to 98 on 4L NC and s/p albuterol and steroid, now weaned to 1L NC  - b/l pleural effusions on CXR, no acute CHF  - BNP 1251. troponin 30 -> 27  - steroids, albuterol, azithro  - pulm consult (Alexys) in AM  - f/u TTE    # Afib with RVR  - c/w home eliquis  - c/w home metoprolol  - tele  - cardiology consult in AM    # Hypotension  # Dizziness  - holding home spironolactone    # Hyponatremia to 127  # Dizziness  - salt tabs tid  - fluid restriction    # Anemia, normocytic, chronic  # Dizziness  - Hgb at baseline  - B12    # Hypothyroid  - f/u TSH  - c/w home levothyroxine    # recent L hip surgery  - c/w home gabapentin  - tylenol/NSAIDs prn    VTE ppx: on eliquis  Diet: DASH  Dispo: 1-2 day    Full code    Discussed with attending Dr. Arzola   89 yo F hx Afib on Eliquis and s/p pacemaker, ?COPD, anemia, HTN, hyponatremia, hypothyroidism, L hip surgeries (most recently 1/2025), BIBA for acute shortness of breath today, accompanied by dizziness and nausea, found to have acute hypoxic respiratory failure likely 2/2 PNA vs COPD vs CHF exacerbation.    PLAN    # AHRF  # Dyspnea  # ?COPD exacerbation  # ?CHF exacerbation  # Pleural effusion b/l  - 90% on RA in ED, not on home O2. improved to 98 on 4L NC and s/p albuterol and steroid, now weaned to 1L NC  - b/l pleural effusions on CXR, no acute CHF  - BNP 1251. troponin 30 -> 27  - steroids, albuterol, azithro  - pulm consult (Alexys) in AM  - f/u TTE    # Afib with RVR  - c/w home eliquis  - c/w home metoprolol  - tele  - cardiology consult in AM    # Hypotension  # Dizziness  - holding home spironolactone    # Hyponatremia to 127  # Dizziness  - salt tabs tid  - fluid restriction  - CTM    # Anemia, normocytic, chronic  # Dizziness  - Hgb at baseline  - B12    # Hypothyroid  - f/u TSH  - c/w home levothyroxine    # recent L hip surgery  - c/w home gabapentin  - tylenol/NSAIDs prn  - PT consulted    VTE ppx: on eliquis  Diet: DASH  Dispo: 1-2 day, PT consulted    Full code    Plan not final until attending signs   89 yo F hx Afib on Eliquis and s/p pacemaker, asthma (per old pulm note), anemia, HTN, hyponatremia, hypothyroidism, L hip surgeries (most recently 1/2025), BIBA for acute shortness of breath today, accompanied by dizziness and nausea, found to have acute hypoxic respiratory failure likely 2/2 asthma vs CHF exacerbation.    PLAN    # AHRF  # Dyspnea  # Asthma exacerbation  # ?CHF exacerbation  # Pleural effusion b/l  - 90% on RA in ED, not on home O2. improved to 98 on 4L NC and s/p albuterol and steroid, now weaned to 1L NC  - CXR cardiomegaly but otherwise neg  - BNP 1251. troponin 30 -> 27  - solumedrol 40 q12, blaise   - pulm consult (Alexys) in AM  - f/u TTE. no previous in chart    # Afib with RVR  - likely 2/2 stress and nebs  - c/w home eliquis. no reported bleeding, falls, and Hgb at baseline  - c/w home metoprolol  - tele    # BP soft  # Dizziness  - holding parameters for BP meds  - holding home spironolactone    # Hyponatremia to 127  # Dizziness, nausea  - salt tabs tid  - CTM    # Anemia, normocytic, chronic  # Dizziness  - Hgb at baseline  - f/u B12    # Hypothyroid  - f/u TSH  - c/w home levothyroxine    # recent L hip surgery  - c/w home gabapentin  - tylenol/NSAIDs prn  - PT consulted  - fall precautions    VTE ppx: on eliquis  Diet: DASH  Dispo: 1-2 day, PT consulted    Full code    Discussed with attending Dr. Arzola   89 yo F hx Afib on Eliquis and s/p pacemaker, asthma (per old pulm note), anemia, HTN, hyponatremia, hypothyroidism, L hip surgeries (most recently 1/2025), BIBA for acute shortness of breath today, accompanied by dizziness and nausea, found to have acute hypoxic respiratory failure likely 2/2 asthma vs CHF exacerbation.    PLAN    # AHRF  # Dyspnea  # Asthma exacerbation  # ?CHF exacerbation  # Pleural effusion b/l  - 90% on RA in ED, not on home O2. improved to 98 on 4L NC and s/p albuterol and steroid, now weaned to 1L NC  - CXR cardiomegaly but otherwise neg  - BNP 1251. troponin 30 -> 27  - solumedrol 40 q12, duonebs   - consider pulm consult (Alexys) in AM  - f/u TTE. no previous in chart    # Afib with RVR  - likely 2/2 stress and nebs  - c/w home eliquis. no reported bleeding, falls, and Hgb at baseline  - c/w home metoprolol  - tele    # BP soft  # Dizziness  - holding parameters for BP meds  - holding home spironolactone    # Hyponatremia to 127  # Dizziness, nausea  - salt tabs tid  - CTM    # Anemia, normocytic, chronic  # Dizziness  - Hgb at baseline  - f/u B12    # Hypothyroid  - f/u TSH  - c/w home levothyroxine    # recent L hip surgery  - c/w home gabapentin  - tylenol/NSAIDs prn  - PT consulted  - fall precautions    VTE ppx: on eliquis  Diet: DASH  Dispo: 2-3 day, PT consulted    Full code    Discussed with attending Dr. Arzola   89 yo F hx Afib on Eliquis and s/p pacemaker, asthma (per old pulm note), anemia, HTN, hyponatremia, hypothyroidism, L hip surgeries (most recently 1/2025), BIBA for acute shortness of breath today, accompanied by dizziness and nausea, found to have acute hypoxic respiratory failure likely 2/2 asthma vs CHF exacerbation.    PLAN    # AHRF  # Dyspnea  # Asthma exacerbation  # ?CHF exacerbation  # Pleural effusion b/l  - 90% on RA in ED, not on home O2. improved to 98 on 4L NC and s/p albuterol and steroid, now weaned to 1L NC  - CXR cardiomegaly but otherwise neg  - BNP 1251. troponin 30 -> 27  - solumedrol 40 q12, duonebs   - consider pulm consult (Alexys) in AM  - f/u TTE. no previous in chart    # Afib with RVR  - likely 2/2 stress and nebs  - c/w home eliquis. no reported bleeding, falls, and Hgb at baseline  - c/w home metoprolol  - tele    # BP soft  # Dizziness  - holding parameters for BP meds  - holding home spironolactone    # Hyponatremia to 127  # Dizziness, nausea  - salt tabs tid  - CTM    # Anemia, normocytic, chronic  # Dizziness  - Hgb at baseline  - f/u B12    # Hypothyroid  - f/u TSH  - c/w home levothyroxine    # recent L hip surgery  - c/w home gabapentin  - tylenol/NSAIDs prn  - PT consulted  - fall precautions    VTE ppx: on eliquis  Diet: Regular  Dispo: 2-3 day, PT consulted    Full code    Discussed with attending Dr. Arzola   89 yo F hx Afib on Eliquis and s/p pacemaker, asthma (per old pulm note), anemia, HTN, hyponatremia, hypothyroidism, L hip surgeries (most recently 1/2025), BIBA for acute shortness of breath today, accompanied by dizziness and nausea, found to have acute hypoxic respiratory failure likely 2/2 asthma vs CHF exacerbation.    PLAN    # AHRF  # Dyspnea  # Asthma exacerbation  # ?CHF exacerbation  # Pleural effusion b/l  - 90% on RA in ED, not on home O2. improved to 98 on 4L NC and s/p albuterol and steroid, now weaned to 1L NC  - CXR cardiomegaly but otherwise neg  - BNP 1251. troponin 30 -> 27  - solumedrol 40 q12, duonebs   - consider pulm consult (Alexys) in AM  - f/u TTE. no previous in chart    # Afib with RVR  - likely 2/2 stress and nebs  - c/w home eliquis. no reported bleeding, falls, and Hgb at baseline  - c/w home metoprolol  - tele    # BP soft  # Dizziness  - holding parameters for BP meds  - holding home spironolactone    # Hyponatremia to 127  # Dizziness, nausea  - salt tabs tid  - CTM    # Anemia MCV 99, chronic  # Dizziness  - Hgb at baseline  - f/u B12    # Hypothyroid  - f/u TSH  - c/w home levothyroxine    # recent L hip surgery  - c/w home gabapentin  - tylenol/NSAIDs prn  - PT consulted  - fall precautions    VTE ppx: on eliquis  Diet: Regular  Dispo: 2-3 day, PT consulted    Full code    Discussed with attending Dr. Arzola   89 yo F hx Afib on Eliquis and s/p pacemaker, asthma (per old pulm note), anemia, HTN, hyponatremia, hypothyroidism, L hip surgeries (most recently 1/2025), BIBA for acute shortness of breath today, accompanied by dizziness and nausea, found to have acute hypoxic respiratory failure due to Asthma exacerbation.     PLAN    # AHRF  # Asthma exacerbation  - 90% on RA in ED, not on home O2. improved to 98 on 4L NC and s/p albuterol and steroid, now weaned to 1L NC  - CXR cardiomegaly but otherwise neg  - BNP 1251. troponin 30 -> 27  - solumedrol 40 q12, duonebs   - RVP negative.   - consider pulm consult (Alexys) in AM if not improving  - f/u TTE. no previous in chart  -OOB. ambulate, fall risk, PT program  -VCDs while in bed, on Eliquis     # Afib with RVR  - likely 2/2 resp. distress, nebs, missing evening dose of BB  - c/w home eliquis. no reported bleeding, falls, and Hgb at baseline  - c/w home metoprolol PO and PRN IV for sustained HD  - tele    # Hyponatremia to 127-baseline  - salt tabs tid  - Hold aldactone   -Repeat BMP in AM to trend    # Anemia MCV 99, chronic  - Hgb at baseline  - f/u B12  -repeat CBC in AM    # Hypothyroid  - f/u TSH  - c/w home levothyroxine    # recent L hip surgery  - c/w home gabapentin  - tylenol/NSAIDs prn  - PT consulted  - fall precautions    VTE ppx: on eliquis  Diet: Regular  Dispo: 2-3 day, PT consulted    Full code    Disposition: likely home in 24-48 hours pending clinical response

## 2025-05-19 NOTE — ED PROVIDER NOTE - CLINICAL SUMMARY MEDICAL DECISION MAKING FREE TEXT BOX
89-year-old female with past medical history significant for hypertension, A-fib (on Eliquis), hypothyroidism ED c/o shortness of breath. Patient pending labs, CXR, UA.  Patient scheduled to receive DuoNebs and steroids. 89-year-old female with past medical history significant for hypertension, A-fib (on Eliquis), hypothyroidism ED c/o shortness of breath. Patient pending labs, CXR, UA.  Patient scheduled to receive DuoNebs and steroids. Patient hyponatremic and continues to require O2. Patient will be admitted for further care.

## 2025-05-19 NOTE — ED PROVIDER NOTE - ATTENDING CONTRIBUTION TO CARE
I, Marquise Acevedo MD, spent 35 minutes of critical care time with this patient. This does not include time spent on separately reported billable procedures.    89 year old female PMHx atrial fibrillation, thyroid disorder c/o dyspnea. + tachyarrhythmia, + rhonchi and mild expiratory wheezing. labs and diagnostic imaging results reviewed with patient. admit

## 2025-05-20 ENCOUNTER — RESULT REVIEW (OUTPATIENT)
Age: 89
End: 2025-05-20

## 2025-05-20 LAB
ALBUMIN SERPL ELPH-MCNC: 4.2 G/DL — SIGNIFICANT CHANGE UP (ref 3.3–5.2)
ALP SERPL-CCNC: 70 U/L — SIGNIFICANT CHANGE UP (ref 40–120)
ALT FLD-CCNC: 32 U/L — SIGNIFICANT CHANGE UP
ANION GAP SERPL CALC-SCNC: 17 MMOL/L — SIGNIFICANT CHANGE UP (ref 5–17)
AST SERPL-CCNC: 76 U/L — HIGH
BASOPHILS # BLD AUTO: 0.01 K/UL — SIGNIFICANT CHANGE UP (ref 0–0.2)
BASOPHILS # BLD MANUAL: 0.04 K/UL — SIGNIFICANT CHANGE UP (ref 0–0.2)
BASOPHILS NFR BLD AUTO: 0.2 % — SIGNIFICANT CHANGE UP (ref 0–2)
BASOPHILS NFR BLD MANUAL: 0.9 % — SIGNIFICANT CHANGE UP (ref 0–2)
BILIRUB SERPL-MCNC: 1.3 MG/DL — SIGNIFICANT CHANGE UP (ref 0.4–2)
BUN SERPL-MCNC: 10 MG/DL — SIGNIFICANT CHANGE UP (ref 8–20)
CALCIUM SERPL-MCNC: 9.4 MG/DL — SIGNIFICANT CHANGE UP (ref 8.4–10.5)
CHLORIDE SERPL-SCNC: 97 MMOL/L — SIGNIFICANT CHANGE UP (ref 96–108)
CO2 SERPL-SCNC: 18 MMOL/L — LOW (ref 22–29)
CREAT SERPL-MCNC: 0.77 MG/DL — SIGNIFICANT CHANGE UP (ref 0.5–1.3)
EGFR: 71 ML/MIN/1.73M2 — SIGNIFICANT CHANGE UP
EGFR: 71 ML/MIN/1.73M2 — SIGNIFICANT CHANGE UP
EOSINOPHIL # BLD AUTO: 0 K/UL — SIGNIFICANT CHANGE UP (ref 0–0.5)
EOSINOPHIL # BLD MANUAL: 0 K/UL — SIGNIFICANT CHANGE UP (ref 0–0.5)
EOSINOPHIL NFR BLD AUTO: 0 % — SIGNIFICANT CHANGE UP (ref 0–6)
EOSINOPHIL NFR BLD MANUAL: 0 % — SIGNIFICANT CHANGE UP (ref 0–6)
GIANT PLATELETS BLD QL SMEAR: PRESENT
GLUCOSE SERPL-MCNC: 171 MG/DL — HIGH (ref 70–99)
HCT VFR BLD CALC: 31.6 % — LOW (ref 34.5–45)
HGB BLD-MCNC: 10.5 G/DL — LOW (ref 11.5–15.5)
IMM GRANULOCYTES # BLD AUTO: 0.02 K/UL — SIGNIFICANT CHANGE UP (ref 0–0.07)
IMM GRANULOCYTES NFR BLD AUTO: 0.4 % — SIGNIFICANT CHANGE UP (ref 0–0.9)
LYMPHOCYTES # BLD AUTO: 0.38 K/UL — LOW (ref 1–3.3)
LYMPHOCYTES # BLD MANUAL: 0.2 K/UL — LOW (ref 1–3.3)
LYMPHOCYTES NFR BLD AUTO: 8.3 % — LOW (ref 13–44)
LYMPHOCYTES NFR BLD MANUAL: 4.3 % — LOW (ref 13–44)
MAGNESIUM SERPL-MCNC: 1.3 MG/DL — LOW (ref 1.6–2.6)
MCHC RBC-ENTMCNC: 32.4 PG — SIGNIFICANT CHANGE UP (ref 27–34)
MCHC RBC-ENTMCNC: 33.2 G/DL — SIGNIFICANT CHANGE UP (ref 32–36)
MCV RBC AUTO: 97.5 FL — SIGNIFICANT CHANGE UP (ref 80–100)
MONOCYTES # BLD AUTO: 0.05 K/UL — SIGNIFICANT CHANGE UP (ref 0–0.9)
MONOCYTES # BLD MANUAL: 0.08 K/UL — SIGNIFICANT CHANGE UP (ref 0–0.9)
MONOCYTES NFR BLD AUTO: 1.1 % — LOW (ref 2–14)
MONOCYTES NFR BLD MANUAL: 1.7 % — LOW (ref 2–14)
NEUTROPHILS # BLD AUTO: 4.12 K/UL — SIGNIFICANT CHANGE UP (ref 1.8–7.4)
NEUTROPHILS # BLD MANUAL: 4.26 K/UL — SIGNIFICANT CHANGE UP (ref 1.8–7.4)
NEUTROPHILS NFR BLD AUTO: 90 % — HIGH (ref 43–77)
NEUTROPHILS NFR BLD MANUAL: 93.1 % — HIGH (ref 43–77)
NRBC # BLD AUTO: 0 K/UL — SIGNIFICANT CHANGE UP (ref 0–0)
NRBC # FLD: 0 K/UL — SIGNIFICANT CHANGE UP (ref 0–0)
NRBC BLD AUTO-RTO: 0 /100 WBCS — SIGNIFICANT CHANGE UP (ref 0–0)
PHOSPHATE SERPL-MCNC: 3.4 MG/DL — SIGNIFICANT CHANGE UP (ref 2.4–4.7)
PLAT MORPH BLD: NORMAL — SIGNIFICANT CHANGE UP
PLATELET # BLD AUTO: 131 K/UL — LOW (ref 150–400)
PMV BLD: 10.4 FL — SIGNIFICANT CHANGE UP (ref 7–13)
POTASSIUM SERPL-MCNC: 4.9 MMOL/L — SIGNIFICANT CHANGE UP (ref 3.5–5.3)
POTASSIUM SERPL-SCNC: 4.9 MMOL/L — SIGNIFICANT CHANGE UP (ref 3.5–5.3)
PROT SERPL-MCNC: 7.5 G/DL — SIGNIFICANT CHANGE UP (ref 6.6–8.7)
RBC # BLD: 3.24 M/UL — LOW (ref 3.8–5.2)
RBC # FLD: 14.2 % — SIGNIFICANT CHANGE UP (ref 10.3–14.5)
RBC BLD AUTO: NORMAL — SIGNIFICANT CHANGE UP
SODIUM SERPL-SCNC: 132 MMOL/L — LOW (ref 135–145)
TSH SERPL-MCNC: 0.35 UIU/ML — SIGNIFICANT CHANGE UP (ref 0.27–4.2)
VIT B12 SERPL-MCNC: >2000 PG/ML — HIGH (ref 232–1245)
WBC # BLD: 4.58 K/UL — SIGNIFICANT CHANGE UP (ref 3.8–10.5)
WBC # FLD AUTO: 4.58 K/UL — SIGNIFICANT CHANGE UP (ref 3.8–10.5)

## 2025-05-20 PROCEDURE — 93306 TTE W/DOPPLER COMPLETE: CPT | Mod: 26

## 2025-05-20 PROCEDURE — 99233 SBSQ HOSP IP/OBS HIGH 50: CPT

## 2025-05-20 RX ORDER — METOPROLOL SUCCINATE 50 MG/1
2.5 TABLET, EXTENDED RELEASE ORAL EVERY 6 HOURS
Refills: 0 | Status: DISCONTINUED | OUTPATIENT
Start: 2025-05-20 | End: 2025-05-22

## 2025-05-20 RX ORDER — MAGNESIUM SULFATE 500 MG/ML
2 SYRINGE (ML) INJECTION ONCE
Refills: 0 | Status: COMPLETED | OUTPATIENT
Start: 2025-05-20 | End: 2025-05-20

## 2025-05-20 RX ORDER — METOPROLOL SUCCINATE 50 MG/1
25 TABLET, EXTENDED RELEASE ORAL
Refills: 0 | Status: DISCONTINUED | OUTPATIENT
Start: 2025-05-20 | End: 2025-05-20

## 2025-05-20 RX ORDER — SPIRONOLACTONE 25 MG
25 TABLET ORAL DAILY
Refills: 0 | Status: DISCONTINUED | OUTPATIENT
Start: 2025-05-20 | End: 2025-05-22

## 2025-05-20 RX ORDER — METOPROLOL SUCCINATE 50 MG/1
25 TABLET, EXTENDED RELEASE ORAL EVERY 6 HOURS
Refills: 0 | Status: DISCONTINUED | OUTPATIENT
Start: 2025-05-20 | End: 2025-05-22

## 2025-05-20 RX ORDER — FUROSEMIDE 10 MG/ML
20 INJECTION INTRAMUSCULAR; INTRAVENOUS DAILY
Refills: 0 | Status: DISCONTINUED | OUTPATIENT
Start: 2025-05-20 | End: 2025-05-21

## 2025-05-20 RX ORDER — QUETIAPINE FUMARATE 25 MG/1
12.5 TABLET ORAL ONCE
Refills: 0 | Status: COMPLETED | OUTPATIENT
Start: 2025-05-20 | End: 2025-05-20

## 2025-05-20 RX ORDER — METOPROLOL SUCCINATE 50 MG/1
25 TABLET, EXTENDED RELEASE ORAL ONCE
Refills: 0 | Status: COMPLETED | OUTPATIENT
Start: 2025-05-20 | End: 2025-05-20

## 2025-05-20 RX ORDER — SODIUM BICARBONATE 1 MEQ/ML
650 SYRINGE (ML) INTRAVENOUS
Refills: 0 | Status: DISCONTINUED | OUTPATIENT
Start: 2025-05-20 | End: 2025-05-21

## 2025-05-20 RX ORDER — IPRATROPIUM BROMIDE AND ALBUTEROL SULFATE .5; 2.5 MG/3ML; MG/3ML
3 SOLUTION RESPIRATORY (INHALATION) EVERY 6 HOURS
Refills: 0 | Status: DISCONTINUED | OUTPATIENT
Start: 2025-05-20 | End: 2025-05-21

## 2025-05-20 RX ORDER — METHYLPREDNISOLONE ACETATE 80 MG/ML
40 INJECTION, SUSPENSION INTRA-ARTICULAR; INTRALESIONAL; INTRAMUSCULAR; SOFT TISSUE EVERY 12 HOURS
Refills: 0 | Status: DISCONTINUED | OUTPATIENT
Start: 2025-05-20 | End: 2025-05-20

## 2025-05-20 RX ADMIN — Medication 2 TABLET(S): at 21:06

## 2025-05-20 RX ADMIN — METOPROLOL SUCCINATE 25 MILLIGRAM(S): 50 TABLET, EXTENDED RELEASE ORAL at 17:07

## 2025-05-20 RX ADMIN — Medication 25 GRAM(S): at 19:35

## 2025-05-20 RX ADMIN — APIXABAN 2.5 MILLIGRAM(S): 2.5 TABLET, FILM COATED ORAL at 17:07

## 2025-05-20 RX ADMIN — APIXABAN 2.5 MILLIGRAM(S): 2.5 TABLET, FILM COATED ORAL at 05:29

## 2025-05-20 RX ADMIN — METHYLPREDNISOLONE ACETATE 40 MILLIGRAM(S): 80 INJECTION, SUSPENSION INTRA-ARTICULAR; INTRALESIONAL; INTRAMUSCULAR; SOFT TISSUE at 05:27

## 2025-05-20 RX ADMIN — Medication 40 MILLIGRAM(S): at 05:28

## 2025-05-20 RX ADMIN — IPRATROPIUM BROMIDE AND ALBUTEROL SULFATE 3 MILLILITER(S): .5; 2.5 SOLUTION RESPIRATORY (INHALATION) at 09:24

## 2025-05-20 RX ADMIN — METOPROLOL SUCCINATE 2.5 MILLIGRAM(S): 50 TABLET, EXTENDED RELEASE ORAL at 08:21

## 2025-05-20 RX ADMIN — METOPROLOL SUCCINATE 25 MILLIGRAM(S): 50 TABLET, EXTENDED RELEASE ORAL at 01:23

## 2025-05-20 RX ADMIN — QUETIAPINE FUMARATE 12.5 MILLIGRAM(S): 25 TABLET ORAL at 01:23

## 2025-05-20 RX ADMIN — METOPROLOL SUCCINATE 25 MILLIGRAM(S): 50 TABLET, EXTENDED RELEASE ORAL at 12:53

## 2025-05-20 RX ADMIN — Medication 1 GRAM(S): at 12:50

## 2025-05-20 RX ADMIN — Medication 650 MILLIGRAM(S): at 17:07

## 2025-05-20 RX ADMIN — Medication 1 GRAM(S): at 05:29

## 2025-05-20 RX ADMIN — Medication 50 MICROGRAM(S): at 05:29

## 2025-05-20 RX ADMIN — GABAPENTIN 300 MILLIGRAM(S): 400 CAPSULE ORAL at 21:06

## 2025-05-20 RX ADMIN — IPRATROPIUM BROMIDE AND ALBUTEROL SULFATE 3 MILLILITER(S): .5; 2.5 SOLUTION RESPIRATORY (INHALATION) at 20:18

## 2025-05-20 RX ADMIN — Medication 1 TABLET(S): at 12:47

## 2025-05-20 RX ADMIN — METOPROLOL SUCCINATE 25 MILLIGRAM(S): 50 TABLET, EXTENDED RELEASE ORAL at 05:29

## 2025-05-20 RX ADMIN — METOPROLOL SUCCINATE 2.5 MILLIGRAM(S): 50 TABLET, EXTENDED RELEASE ORAL at 10:37

## 2025-05-20 RX ADMIN — Medication 500 MILLIGRAM(S): at 12:47

## 2025-05-20 NOTE — PROGRESS NOTE ADULT - SUBJECTIVE AND OBJECTIVE BOX
Patient is a 89y old  Female who presents with a chief complaint of Asthma exacerbation  Afib with RVR (20 May 2025 10:41)      Patient seen and examined at bedside this am   she is awake alert denies CP , no SOB   chart reviewed   tele with rapid atrial fib   cardiology consulted this am     no wheezing         ALLERGIES:  Demerol HCl (Nausea)  shellfish (Rash)  Robaxin (Other)  codeine (Unknown)  sulfa drugs (Other)  Bactrim (Other)    MEDICATIONS  (STANDING):  albuterol/ipratropium for Nebulization 3 milliLiter(s) Nebulizer every 6 hours  apixaban 2.5 milliGRAM(s) Oral every 12 hours  ascorbic acid 500 milliGRAM(s) Oral daily  gabapentin 300 milliGRAM(s) Oral at bedtime  levothyroxine 50 MICROGram(s) Oral daily  methylPREDNISolone sodium succinate Injectable 40 milliGRAM(s) IV Push every 12 hours  metoprolol tartrate 25 milliGRAM(s) Oral every 6 hours  multivitamin 1 Tablet(s) Oral daily  pantoprazole    Tablet 40 milliGRAM(s) Oral before breakfast  senna 2 Tablet(s) Oral at bedtime  sodium chloride 1 Gram(s) Oral three times a day    MEDICATIONS  (PRN):  acetaminophen     Tablet .. 650 milliGRAM(s) Oral every 6 hours PRN Temp greater or equal to 38C (100.4F), Mild Pain (1 - 3)  aluminum hydroxide/magnesium hydroxide/simethicone Suspension 30 milliLiter(s) Oral every 4 hours PRN Dyspepsia  artificial  tears Solution 1 Drop(s) Both EYES every 4 hours PRN Dry Eyes  bisacodyl 5 milliGRAM(s) Oral every 12 hours PRN Constipation  melatonin 3 milliGRAM(s) Oral at bedtime PRN Insomnia  metoprolol tartrate Injectable 2.5 milliGRAM(s) IV Push every 6 hours PRN HR sustained >140 for more than 2 minutes  ondansetron Injectable 4 milliGRAM(s) IV Push every 8 hours PRN Nausea and/or Vomiting    Vital Signs Last 24 Hrs  T(F): 97.3 (20 May 2025 11:14), Max: 98.7 (19 May 2025 19:45)  HR: 102 (20 May 2025 11:14) (91 - 110)  BP: 142/97 (20 May 2025 11:14) (117/96 - 176/96)  RR: 18 (20 May 2025 11:14) (18 - 24)  SpO2: 94% (20 May 2025 11:14) (94% - 100%)  I&O's Summary      PHYSICAL EXAM:  General: awake   Neck: supple   Lungs: CTA no wheezing   Cardio: irregular tachycardic , S1/S2, No murmur  Abdomen: Soft, Nontender, Nondistended; Bowel sounds present  Extremities: No calf tenderness, No pitting edema  Skin normal color   LABS:                        10.5   4.58  )-----------( 131      ( 20 May 2025 04:19 )             31.6     05-20    132  |  97  |  10.0  ----------------------------<  171  4.9   |  18.0  |  0.77    Ca    9.4      20 May 2025 04:19  Phos  3.4     05-20  Mg     1.3     05-20    TPro  7.5  /  Alb  4.2  /  TBili  1.3  /  DBili  x   /  AST  76  /  ALT  32  /  AlkPhos  70  05-20          PT/INR - ( 19 May 2025 19:30 )   PT: 16.1 sec;   INR: 1.39 ratio         PTT - ( 19 May 2025 19:30 )  PTT:36.7 sec            TSH 0.35   TSH with FT4 reflex --  Total T3 --    19:30 - VBG - pH:       | pCO2:       | pO2:       | Lactate: 0.90                 Urinalysis Basic - ( 20 May 2025 04:19 )    Color: x / Appearance: x / SG: x / pH: x  Gluc: 171 mg/dL / Ketone: x  / Bili: x / Urobili: x   Blood: x / Protein: x / Nitrite: x   Leuk Esterase: x / RBC: x / WBC x   Sq Epi: x / Non Sq Epi: x / Bacteria: x          RADIOLOGY & ADDITIONAL TESTS:       # Non-ischemic cardiomyopathy   - patient presented for worsening of shortness of breath with exertion, noted to be severely hypervolemic   - BNP 2749,  CXR shows new pulmonary congestion  - CTA is neg for PE  - troponins neg x2, EKG shows NSR no ST elevations or TWI  - TTE with poor windows due to body habitus, but showing severe global LV dysfunction   - LHC with mild atherosclerosis in Cx and LAD and mod-severe disease of RCA.   - BLE dopplers negative   - c/w IV lasix 40mg BID  - GDMT: metoprolol succinate 25mg daily, and losartan 25 mg daily; plan to start Entresto on Monday  - cardiology consulted appreciate recs  - Unable to obtain inpatient cardiac MRI *weight limit is 350lbs, will need to be done as outpatient   - daily weights, strict I/Os, fluid restrict 1L/day, DASH diet, nutrition consult

## 2025-05-20 NOTE — PATIENT PROFILE ADULT - FALL HARM RISK - HARM RISK INTERVENTIONS
Assistance with ambulation/Assistance OOB with selected safe patient handling equipment/Communicate Risk of Fall with Harm to all staff/Discuss with provider need for PT consult/Monitor gait and stability/Provide patient with walking aids - walker, cane, crutches/Reinforce activity limits and safety measures with patient and family/Tailored Fall Risk Interventions/Use of alarms - bed, chair and/or voice tab/Visual Cue: Yellow wristband and red socks/Bed in lowest position, wheels locked, appropriate side rails in place/Call bell, personal items and telephone in reach/Instruct patient to call for assistance before getting out of bed or chair/Non-slip footwear when patient is out of bed/[place] to call system/Physically safe environment - no spills, clutter or unnecessary equipment/Purposeful Proactive Rounding/Room/bathroom lighting operational, light cord in reach

## 2025-05-20 NOTE — CONSULT NOTE ADULT - SUBJECTIVE AND OBJECTIVE BOX
[facility]                              [address] [address]                                                 PHONE: [phone]                                                 FAX: [phone]  ------------------------------------------------------------------------------------------------    Chief complaint: Shortness of breath    89y Female with past medical history as under BIBA for acute shortness of breath today accompanied by nausea and dizziness. Pt states she had a large meal and then was resting and suddenly felt short of breath and dizzy. She was noted to be in AF with RVR.     at bedside assists with medications and care. Pt reports feeling fatigued and tired. She follows with Dr. Quintana. She had permanent AF and maintained on anticoagulation.    RELEVANT PHYSICAL EXAM:  Neck: No obvious JVD  Cardiovascular: irregular S1, S2 tachy  Respiratory: Lungs clear to auscultation; no crepitations, no wheeze  Musculoskeletal: Trace edema    Vital Signs Last 24 Hrs  T(C): 36.5 (20 May 2025 04:27), Max: 37.1 (19 May 2025 19:45)  T(F): 97.7 (20 May 2025 04:27), Max: 98.7 (19 May 2025 19:45)  HR: 91 (20 May 2025 04:27) (91 - 110)  BP: 139/81 (20 May 2025 04:27) (117/96 - 176/96)  BP(mean): --  RR: 18 (20 May 2025 04:27) (18 - 24)  SpO2: 98% (20 May 2025 04:27) (95% - 100%)    Parameters below as of 20 May 2025 04:27  Patient On (Oxygen Delivery Method): nasal cannula  O2 Flow (L/min): 1       PAST MEDICAL & SURGICAL HISTORY:  Hypertension      Pacemaker      GERD (gastroesophageal reflux disease)      Afib      HLD (hyperlipidemia)      Asthma      H/O CHF      Hypothyroidism      Nephrolithiasis      Hyponatremia      H/O: hysterectomy      H/O laminectomy  lumbar      H/O hand surgery      History of back surgery      History of appendectomy      S/P tonsillectomy       Demerol HCl (Nausea)  shellfish (Rash)  Robaxin (Other)  codeine (Unknown)  sulfa drugs (Other)  Bactrim (Other)      LABS:                        10.5   4.58  )-----------( 131      ( 20 May 2025 04:19 )             31.6   05-20    132[L]  |  97  |  10.0  ----------------------------<  171[H]  4.9   |  18.0[L]  |  0.77    Ca    9.4      20 May 2025 04:19  Phos  3.4     05-20  Mg     1.3     05-20    TPro  7.5  /  Alb  4.2  /  TBili  1.3  /  DBili  x   /  AST  76[H]  /  ALT  32  /  AlkPhos  70  05-20    PT/INR - ( 19 May 2025 19:30 )   PT: 16.1 sec;   INR: 1.39 ratio         PTT - ( 19 May 2025 19:30 )  PTT:36.7 sec    RADIOLOGY & ADDITIONAL STUDIES: (reviewed)  CXR was independently visualized/reviewed and demonstrated: cardiomegaly    CARDIOLOGY TESTING:(reviewed)     ECG (Independent visualization): AF with RVR    TELEMETRY independently visualized/reviewed and demonstrated : AF with RVR    MEDICATIONS:(reviewed)  Home Medications:    MEDICATIONS  (PRN):  acetaminophen     Tablet .. 650 milliGRAM(s) Oral every 6 hours PRN Temp greater or equal to 38C (100.4F), Mild Pain (1 - 3)  aluminum hydroxide/magnesium hydroxide/simethicone Suspension 30 milliLiter(s) Oral every 4 hours PRN Dyspepsia  artificial  tears Solution 1 Drop(s) Both EYES every 4 hours PRN Dry Eyes  bisacodyl 5 milliGRAM(s) Oral every 12 hours PRN Constipation  melatonin 3 milliGRAM(s) Oral at bedtime PRN Insomnia  metoprolol tartrate Injectable 2.5 milliGRAM(s) IV Push every 6 hours PRN HR sustained >140 for more than 2 minutes  ondansetron Injectable 4 milliGRAM(s) IV Push every 8 hours PRN Nausea and/or Vomiting      MEDICATIONS  (STANDING):  albuterol/ipratropium for Nebulization 3 milliLiter(s) Nebulizer every 6 hours  apixaban 2.5 milliGRAM(s) Oral every 12 hours  ascorbic acid 500 milliGRAM(s) Oral daily  gabapentin 300 milliGRAM(s) Oral at bedtime  levothyroxine 50 MICROGram(s) Oral daily  methylPREDNISolone sodium succinate Injectable 40 milliGRAM(s) IV Push every 12 hours  metoprolol tartrate 25 milliGRAM(s) Oral two times a day  multivitamin 1 Tablet(s) Oral daily  pantoprazole    Tablet 40 milliGRAM(s) Oral before breakfast  senna 2 Tablet(s) Oral at bedtime  sodium chloride 1 Gram(s) Oral three times a day      ASSESSMENT AND PLAN:    89y Female with permanent AF on Eliquis, PPM, coronary calcifications, moderate MR, severe TR, COPD BIBA for acute shortness of breath today accompanied by nausea and dizziness.     Presentation probably consistent with acute on chronic systolic/diastolic HF  - trial of iv lasix now  - Has h/o chronic hyponatremia maintained on salt tablets-likely worsening volume status in the setting of moderate-severe valvular disease    AF  - Rates remain elevated  - Continue metoprolol. increase to 25 mg q6 pending Echo results  - Anticoagulation with Eliquis  - Telemetry monitoring    Shortness of breath  - Likely multifactorial related to AF, RVR, CIOPD and valvular disease    Plan d/w Medicine team    Additional history obtained from family [independent historian] and plan of care discussed in detail

## 2025-05-20 NOTE — PHYSICAL THERAPY INITIAL EVALUATION ADULT - ADDITIONAL COMMENTS
as per pt and daughter, pt lives in private house with , has an elevator, no stairs, independent prior, owns a RW, cane, commode, and WC

## 2025-05-20 NOTE — PHYSICAL THERAPY INITIAL EVALUATION ADULT - IMPAIRMENTS CONTRIBUTING TO GAIT DEVIATIONS, PT EVAL
[facility] [address], [place] [address]

Test Date:    2018               Test Time:    14:01:21

Pat Name:     SILVINO REYNOSO               Department:   

Patient ID:   [id number]           Room:          

Gender:       F                        Technician:   

:          [id number]               Requested By: PINEDA REYNA

Order Number: [id number]            Reading MD:   Brendan Baez

                                 Measurements

Intervals                              Axis          

Rate:         77                       P:            16

NV:           164                      QRS:          -17

QRSD:         92                       T:            68

QT:           370                                    

QTc:          420                                    

                           Interpretive Statements

SINUS RHYTHM

VENTRICULAR PREMATURE COMPLEX(ES)

LEFTWARD AXIS

QRS(T) CONTOUR ABNORMALITY

CONSIDER ANTEROSEPTAL MYOCARDIAL DAMAGE

ABNORMAL ECG



Electronically Signed On 2018 15:40:12 CST by Brendan Baez
impaired balance

## 2025-05-20 NOTE — PATIENT PROFILE ADULT - FUNCTIONAL SCREEN CURRENT LEVEL: SWALLOWING (IF SCORE 2 OR MORE FOR ANY ITEM, CONSULT REHAB SERVICES), MLM)
Pt calling back, Garett was not available.  Pt stated that she is out of the med, hoping to refill today, stated that she has been using the med more lately with current stressful situations in the world.      0 = swallows foods/liquids without difficulty

## 2025-05-20 NOTE — PHYSICAL THERAPY INITIAL EVALUATION ADULT - PERTINENT HX OF CURRENT PROBLEM, REHAB EVAL
89 yo F hx Afib on Eliquis and s/p pacemaker, asthma (per old pulm note), anemia, HTN, hyponatremia, hypothyroidism, L hip surgeries (most recently 1/2025), BIBA for acute shortness of breath today, accompanied by dizziness and nausea, found to have acute hypoxic respiratory failure

## 2025-05-20 NOTE — PHYSICAL THERAPY INITIAL EVALUATION ADULT - STANDING BALANCE: DYNAMIC, REHAB EVAL
good minus Colchicine Counseling:  Patient counseled regarding adverse effects including but not limited to stomach upset (nausea, vomiting, stomach pain, or diarrhea).  Patient instructed to limit alcohol consumption while taking this medication.  Colchicine may reduce blood counts especially with prolonged use.  The patient understands that monitoring of kidney function and blood counts may be required, especially at baseline. The patient verbalized understanding of the proper use and possible adverse effects of colchicine.  All of the patient's questions and concerns were addressed.

## 2025-05-20 NOTE — PROGRESS NOTE ADULT - ASSESSMENT
89 yo F hx Afib on Eliquis and s/p pacemaker, asthma (per old pulm note), anemia, HTN, hyponatremia, hypothyroidism, L hip surgeries (most recently 1/2025), BIBA for acute shortness of breath today, accompanied by dizziness and nausea, found to have acute hypoxic respiratory failure    1-A Hypoxic resp Failure   2-Cronic Atrial fib with RVR   need better control   possible CHF exacerbation   TTE is pending   cardio consulted   increase metoprolol to 25 q6h   cot tele   cont eliquis   may need diuretics     3-Asthma   cronic   no exacerbation   will DC solumedrol   cont nebulizer     4-Hyponatremia   improving DC salt tablet   add Na bicarbonate   BMP in am     5-Anemia MCV 99, chronic  - Hgb at baseline  folate  . iron levels     6-Hypothyroid  TSH is normal  cont levothyroxine       VTE ppx: on eliquis    Diet: low salt     Dispo: 2-3 day,     Full code

## 2025-05-21 ENCOUNTER — TRANSCRIPTION ENCOUNTER (OUTPATIENT)
Age: 89
End: 2025-05-21

## 2025-05-21 LAB
24R-OH-CALCIDIOL SERPL-MCNC: 39 NG/ML — SIGNIFICANT CHANGE UP
ALBUMIN SERPL ELPH-MCNC: 4.2 G/DL — SIGNIFICANT CHANGE UP (ref 3.3–5.2)
ALP SERPL-CCNC: 67 U/L — SIGNIFICANT CHANGE UP (ref 40–120)
ALT FLD-CCNC: 35 U/L — HIGH
ANION GAP SERPL CALC-SCNC: 16 MMOL/L — SIGNIFICANT CHANGE UP (ref 5–17)
APPEARANCE UR: CLEAR — SIGNIFICANT CHANGE UP
AST SERPL-CCNC: 57 U/L — HIGH
BACTERIA # UR AUTO: NEGATIVE /HPF — SIGNIFICANT CHANGE UP
BILIRUB SERPL-MCNC: 1 MG/DL — SIGNIFICANT CHANGE UP (ref 0.4–2)
BILIRUB UR-MCNC: NEGATIVE — SIGNIFICANT CHANGE UP
BUN SERPL-MCNC: 16.3 MG/DL — SIGNIFICANT CHANGE UP (ref 8–20)
CALCIUM SERPL-MCNC: 9.5 MG/DL — SIGNIFICANT CHANGE UP (ref 8.4–10.5)
CHLORIDE SERPL-SCNC: 94 MMOL/L — LOW (ref 96–108)
CO2 SERPL-SCNC: 21 MMOL/L — LOW (ref 22–29)
COLOR SPEC: ABNORMAL
CREAT SERPL-MCNC: 0.83 MG/DL — SIGNIFICANT CHANGE UP (ref 0.5–1.3)
DIFF PNL FLD: ABNORMAL
EGFR: 65 ML/MIN/1.73M2 — SIGNIFICANT CHANGE UP
EGFR: 65 ML/MIN/1.73M2 — SIGNIFICANT CHANGE UP
FERRITIN SERPL-MCNC: 284 NG/ML — SIGNIFICANT CHANGE UP (ref 13–330)
FOLATE SERPL-MCNC: 7.8 NG/ML — SIGNIFICANT CHANGE UP
GLUCOSE SERPL-MCNC: 96 MG/DL — SIGNIFICANT CHANGE UP (ref 70–99)
GLUCOSE UR QL: NEGATIVE MG/DL — SIGNIFICANT CHANGE UP
IRON SATN MFR SERPL: 108 UG/DL — SIGNIFICANT CHANGE UP (ref 37–145)
IRON SATN MFR SERPL: 30 % — SIGNIFICANT CHANGE UP (ref 14–50)
KETONES UR QL: NEGATIVE MG/DL — SIGNIFICANT CHANGE UP
LEUKOCYTE ESTERASE UR-ACNC: ABNORMAL
MAGNESIUM SERPL-MCNC: 1.7 MG/DL — SIGNIFICANT CHANGE UP (ref 1.6–2.6)
NITRITE UR-MCNC: NEGATIVE — SIGNIFICANT CHANGE UP
PH UR: 7 — SIGNIFICANT CHANGE UP (ref 5–8)
PHOSPHATE SERPL-MCNC: 2.5 MG/DL — SIGNIFICANT CHANGE UP (ref 2.4–4.7)
POTASSIUM SERPL-MCNC: 4.8 MMOL/L — SIGNIFICANT CHANGE UP (ref 3.5–5.3)
POTASSIUM SERPL-SCNC: 4.8 MMOL/L — SIGNIFICANT CHANGE UP (ref 3.5–5.3)
PROT SERPL-MCNC: 7.6 G/DL — SIGNIFICANT CHANGE UP (ref 6.6–8.7)
PROT UR-MCNC: 30 MG/DL
RBC CASTS # UR COMP ASSIST: 229 /HPF — HIGH (ref 0–4)
SODIUM SERPL-SCNC: 131 MMOL/L — LOW (ref 135–145)
SP GR SPEC: 1.01 — SIGNIFICANT CHANGE UP (ref 1–1.03)
SQUAMOUS # UR AUTO: 0 /HPF — SIGNIFICANT CHANGE UP (ref 0–5)
TIBC SERPL-MCNC: 362 UG/DL — SIGNIFICANT CHANGE UP (ref 220–430)
TRANSFERRIN SERPL-MCNC: 253 MG/DL — SIGNIFICANT CHANGE UP (ref 192–382)
TSH SERPL-MCNC: 0.51 UIU/ML — SIGNIFICANT CHANGE UP (ref 0.27–4.2)
UROBILINOGEN FLD QL: 1 MG/DL — SIGNIFICANT CHANGE UP (ref 0.2–1)
VIT B12 SERPL-MCNC: >2000 PG/ML — HIGH (ref 232–1245)
WBC UR QL: 5 /HPF — SIGNIFICANT CHANGE UP (ref 0–5)

## 2025-05-21 PROCEDURE — 71046 X-RAY EXAM CHEST 2 VIEWS: CPT | Mod: 26

## 2025-05-21 PROCEDURE — 99233 SBSQ HOSP IP/OBS HIGH 50: CPT

## 2025-05-21 RX ORDER — FUROSEMIDE 10 MG/ML
20 INJECTION INTRAMUSCULAR; INTRAVENOUS DAILY
Refills: 0 | Status: DISCONTINUED | OUTPATIENT
Start: 2025-05-21 | End: 2025-05-21

## 2025-05-21 RX ORDER — FUROSEMIDE 10 MG/ML
20 INJECTION INTRAMUSCULAR; INTRAVENOUS
Refills: 0 | Status: DISCONTINUED | OUTPATIENT
Start: 2025-05-21 | End: 2025-05-22

## 2025-05-21 RX ORDER — POLYETHYLENE GLYCOL 3350 17 G/17G
17 POWDER, FOR SOLUTION ORAL DAILY
Refills: 0 | Status: DISCONTINUED | OUTPATIENT
Start: 2025-05-21 | End: 2025-05-22

## 2025-05-21 RX ORDER — OLANZAPINE 10 MG/1
2.5 TABLET ORAL ONCE
Refills: 0 | Status: COMPLETED | OUTPATIENT
Start: 2025-05-21 | End: 2025-05-21

## 2025-05-21 RX ORDER — ALBUTEROL SULFATE 2.5 MG/3ML
2 VIAL, NEBULIZER (ML) INHALATION EVERY 6 HOURS
Refills: 0 | Status: DISCONTINUED | OUTPATIENT
Start: 2025-05-21 | End: 2025-05-22

## 2025-05-21 RX ADMIN — APIXABAN 2.5 MILLIGRAM(S): 2.5 TABLET, FILM COATED ORAL at 05:36

## 2025-05-21 RX ADMIN — Medication 25 MILLIGRAM(S): at 05:35

## 2025-05-21 RX ADMIN — APIXABAN 2.5 MILLIGRAM(S): 2.5 TABLET, FILM COATED ORAL at 17:13

## 2025-05-21 RX ADMIN — Medication 650 MILLIGRAM(S): at 05:35

## 2025-05-21 RX ADMIN — Medication 40 MILLIGRAM(S): at 05:35

## 2025-05-21 RX ADMIN — OLANZAPINE 2.5 MILLIGRAM(S): 10 TABLET ORAL at 23:12

## 2025-05-21 RX ADMIN — FUROSEMIDE 20 MILLIGRAM(S): 10 INJECTION INTRAMUSCULAR; INTRAVENOUS at 05:36

## 2025-05-21 RX ADMIN — Medication 1 TABLET(S): at 09:53

## 2025-05-21 RX ADMIN — METOPROLOL SUCCINATE 25 MILLIGRAM(S): 50 TABLET, EXTENDED RELEASE ORAL at 01:16

## 2025-05-21 RX ADMIN — Medication 50 MICROGRAM(S): at 05:35

## 2025-05-21 RX ADMIN — METOPROLOL SUCCINATE 25 MILLIGRAM(S): 50 TABLET, EXTENDED RELEASE ORAL at 11:23

## 2025-05-21 RX ADMIN — METOPROLOL SUCCINATE 25 MILLIGRAM(S): 50 TABLET, EXTENDED RELEASE ORAL at 05:36

## 2025-05-21 RX ADMIN — Medication 500 MILLIGRAM(S): at 09:53

## 2025-05-21 RX ADMIN — METOPROLOL SUCCINATE 25 MILLIGRAM(S): 50 TABLET, EXTENDED RELEASE ORAL at 17:13

## 2025-05-21 NOTE — DISCHARGE NOTE NURSING/CASE MANAGEMENT/SOCIAL WORK - PATIENT PORTAL LINK FT
You can access the FollowMyHealth Patient Portal offered by [facility] by registering at the following website: [email or web address]. By joining [employer] [facility]’s FollowMyHealth portal, you will also be able to view your health information using other applications (apps) compatible with our system.

## 2025-05-21 NOTE — CHART NOTE - NSCHARTNOTEFT_GEN_A_CORE
Asked by the nurse to see pt's family at the bedside who wanted to take pt. out AMA.   Pt. is alert, but confused, spoke at length with pt's  and the daughter at the bedside.  I’ve told the family that the hospital evaluation is incomplete discussed treatment plan in details. Pt. has retained urine, will TOV, bladder scan, and encourage to ask to ambulate w/staff. Pt. re-oriented to place and time.  I have answered all questions. Was  able to convince the pt's family to keep pt and re-evaluate in the morning. Pt. is assigned a bed upstairs. Family made aware, and are appreciative.

## 2025-05-21 NOTE — DISCHARGE NOTE PROVIDER - PROVIDER TOKENS
PROVIDER:[TOKEN:[[id number]],SCHEDULEDAPPT:[05/29/2025],SCHEDULEDAPPTTIME:[12:15 PM]],PROVIDER:[TOKEN:[[id number]],FOLLOWUP:[2 weeks]]

## 2025-05-21 NOTE — DISCHARGE NOTE PROVIDER - NSDCQMSTROKE_NEU_ALL_CORE
Add Postop Global No-Charge Code (70241)?: yes Detail Level: Simple Count Minor/Major Decisions Toward Mdm (Not Cosmetic)?: No No

## 2025-05-21 NOTE — DISCHARGE NOTE NURSING/CASE MANAGEMENT/SOCIAL WORK - NSDCFUADDAPPT_GEN_ALL_CORE_FT
STAR INFO:  -Cardio – Office will follow up  -Pcp- Appointment with Dr. Martínez on 5/29   at 12:15 .  If you are unable to attend your pre-scheduled appointment,   please contact the office directly at [phone] to reschedule.      STAR PATIENT FOR CHF / COPD    Please see below for post hospital follow up information     1. V[facility]O Meds To Bed: [phone]  - AGREEABLE    2. Home Care: [facility]    3. Transitional Care Services: [phone]    4, A. Primary Care Appointment: PCP follow up - Appointment scheduled with  Dr. Martínez   On 5/29/2025  At 12:15 PM.  If you are unable to attend your pre-scheduled appointment,  please contact the office directly at [phone] to reschedule.       4, B. Specialty Appointment: [facility] will call to schedule follow up appointment after discharge    5. STAR Education Packet - Provided

## 2025-05-21 NOTE — DISCHARGE NOTE NURSING/CASE MANAGEMENT/SOCIAL WORK - NSDCPEFALRISK_GEN_ALL_CORE
For information on Fall & Injury Prevention, visit: [email or web address] OR  [email or web address] OR  https://www.cdc.gov/steadi/patient.html

## 2025-05-21 NOTE — DISCHARGE NOTE NURSING/CASE MANAGEMENT/SOCIAL WORK - FINANCIAL ASSISTANCE
[facility] provides services at a reduced cost to those who are determined to be eligible through [facility]’s financial assistance program. Information regarding [facility]’s financial assistance program can be found by going to [email or web address] or by calling [phone].

## 2025-05-21 NOTE — DISCHARGE NOTE PROVIDER - NSDCCPCAREPLAN_GEN_ALL_CORE_FT
PRINCIPAL DISCHARGE DIAGNOSIS  Diagnosis: COPD exacerbation  Assessment and Plan of Treatment: cont nebulizer      SECONDARY DISCHARGE DIAGNOSES  Diagnosis: Acute respiratory failure with hypoxia  Assessment and Plan of Treatment:     Diagnosis: CHF exacerbation  Assessment and Plan of Treatment: cardio consulted,  metoprolol increased to 25 q6h, small dose diuretics    Diagnosis: Hyponatremia  Assessment and Plan of Treatment:     Diagnosis: Anemia  Assessment and Plan of Treatment: chronic, Hgb at baseline    Diagnosis: Hypothyroidism  Assessment and Plan of Treatment: TSH is normal,  cont levothyroxine     PRINCIPAL DISCHARGE DIAGNOSIS  Diagnosis: Congestive heart failure  Assessment and Plan of Treatment: Continue Lopressor  Follow up with Cardiology as an outpatient      SECONDARY DISCHARGE DIAGNOSES  Diagnosis: Acute respiratory failure with hypoxia  Assessment and Plan of Treatment:     Diagnosis: CHF exacerbation  Assessment and Plan of Treatment: cardio consulted,  metoprolol increased to 25 q6h, small dose diuretics    Diagnosis: Hyponatremia  Assessment and Plan of Treatment: improved w/ salt tablet, added Na bicarbonate    Diagnosis: Anemia  Assessment and Plan of Treatment: chronic, Hgb at baseline    Diagnosis: Hypothyroidism  Assessment and Plan of Treatment: TSH is normal,  cont levothyroxine

## 2025-05-21 NOTE — PROGRESS NOTE ADULT - SUBJECTIVE AND OBJECTIVE BOX
Patient is a 89y old  Female who presents with CHF and rapid atrail fub         Patient seen and examined at bedside in am   slightly confused   c/o pressure in lower abdominal area , suspected retention   bladder scan ordered this am , + 800 CC   rich in place to monitor     saw pt later this afternoon   [t is awake alert , Family is at the bedside           ALLERGIES:  Demerol HCl (Nausea)  shellfish (Rash)  Robaxin (Other)  codeine (Unknown)  sulfa drugs (Other)  Bactrim (Other)    MEDICATIONS  (STANDING):  albuterol/ipratropium for Nebulization 3 milliLiter(s) Nebulizer every 6 hours  apixaban 2.5 milliGRAM(s) Oral every 12 hours  ascorbic acid 500 milliGRAM(s) Oral daily  furosemide    Tablet 20 milliGRAM(s) Oral daily  gabapentin 300 milliGRAM(s) Oral at bedtime  levothyroxine 50 MICROGram(s) Oral daily  metoprolol tartrate 25 milliGRAM(s) Oral every 6 hours  multivitamin 1 Tablet(s) Oral daily  pantoprazole    Tablet 40 milliGRAM(s) Oral before breakfast  senna 2 Tablet(s) Oral at bedtime  sodium bicarbonate 650 milliGRAM(s) Oral two times a day  spironolactone 25 milliGRAM(s) Oral daily    MEDICATIONS  (PRN):  acetaminophen     Tablet .. 650 milliGRAM(s) Oral every 6 hours PRN Temp greater or equal to 38C (100.4F), Mild Pain (1 - 3)  aluminum hydroxide/magnesium hydroxide/simethicone Suspension 30 milliLiter(s) Oral every 4 hours PRN Dyspepsia  artificial  tears Solution 1 Drop(s) Both EYES every 4 hours PRN Dry Eyes  bisacodyl 5 milliGRAM(s) Oral every 12 hours PRN Constipation  melatonin 3 milliGRAM(s) Oral at bedtime PRN Insomnia  metoprolol tartrate Injectable 2.5 milliGRAM(s) IV Push every 6 hours PRN HR sustained >140 for more than 2 minutes  ondansetron Injectable 4 milliGRAM(s) IV Push every 8 hours PRN Nausea and/or Vomiting    Vital Signs Last 24 Hrs  T(F): 97.4 (21 May 2025 11:22), Max: 98.8 (20 May 2025 19:53)  HR: 80 (21 May 2025 11:22) (67 - 82)  BP: 140/75 (21 May 2025 11:22) (123/70 - 146/72)  RR: 18 (21 May 2025 11:22) (18 - 18)  SpO2: 99% (21 May 2025 11:22) (96% - 99%)  I&O's Summary    21 May 2025 07:01  -  21 May 2025 15:11  --------------------------------------------------------  IN: 0 mL / OUT: 2150 mL / NET: -2150 mL        PHYSICAL EXAM:  General: awake slightly confused    Lungs: CTA , no wheezing   Cardio: RRR, S1/S2, No murmur  Abdomen: Soft, Nontender, Nondistended; Bowel sounds present  Extremities: No calf tenderness, No pitting edema  Skin warm , normal color   LABS:                        10.5   4.58  )-----------( 131      ( 20 May 2025 04:19 )             31.6     05-21    131  |  94  |  16.3  ----------------------------<  96  4.8   |  21.0  |  0.83    Ca    9.5      21 May 2025 02:25  Phos  2.5     05-21  Mg     1.7     05-21    TPro  7.6  /  Alb  4.2  /  TBili  1.0  /  DBili  x   /  AST  57  /  ALT  35  /  AlkPhos  67  05-21          PT/INR - ( 19 May 2025 19:30 )   PT: 16.1 sec;   INR: 1.39 ratio         PTT - ( 19 May 2025 19:30 )  PTT:36.7 sec            TSH 0.51   TSH with FT4 reflex --  Total T3 --    19:30 - VBG - pH:       | pCO2:       | pO2:       | Lactate: 0.90                 Urinalysis Basic - ( 21 May 2025 02:25 )    Color: x / Appearance: x / SG: x / pH: x  Gluc: 96 mg/dL / Ketone: x  / Bili: x / Urobili: x   Blood: x / Protein: x / Nitrite: x   Leuk Esterase: x / RBC: x / WBC x   Sq Epi: x / Non Sq Epi: x / Bacteria: x        Culture - Blood (collected 19 May 2025 19:30)  Source: Blood Blood-Peripheral  Preliminary Report (21 May 2025 04:02):    No growth at 24 hours        RADIOLOGY & ADDITIONAL TESTS:    < from: Xray Chest 2 Views PA/Lat (05.21.25 @ 12:06) >  IMPRESSION:  1. Cardiomegaly, atherosclerosis and AV sequential pacemaker in situ,   without pulmonary edema.  2. Small bibasilar pleural effusions, right greater than left, similar to   the previous study.    --- End of Report ---          < end of copied text >

## 2025-05-21 NOTE — DIETITIAN INITIAL EVALUATION ADULT - NUTRITIONGOAL OUTCOME1
SAMIRA FROM [facility] CALLED AND NEEDS THIS PATIENT'S OFFICE NOTE FROM 06/29/23 AND IF 08/21/23 INCLUDES INFORMATION ABOUT THE   C PAP PLEASE SEND IT TOO     FAX NUMBER  ATTENTION SAMIRA    PHONE  [phone]      Faxed 06/29/2023 note with 08/21/2023  [phone]    Improve nutrition related lab values

## 2025-05-21 NOTE — DISCHARGE NOTE PROVIDER - NSDCFUADDAPPT_GEN_ALL_CORE_FT
STAR INFO:  -Cardio – Office will follow up  -Pcp- Appointment with Dr. Martínez on 5/29   at 12:15 .  If you are unable to attend your pre-scheduled appointment,   please contact the office directly at [phone] to reschedule.

## 2025-05-21 NOTE — CHART NOTE - NSCHARTNOTEFT_GEN_A_CORE
Contacted by RN due to patient very uncooperative overnight. Patient is a 89 yo F hx Afib on Eliquis and s/p pacemaker, asthma (per old pulm note), anemia, HTN, hyponatremia, hypothyroidism, L hip surgeries (most recently 1/2025), BIBA for acute shortness of breath today, accompanied by dizziness and nausea, found to have acute hypoxic respiratory failure.    Patient overnight refusing all night time meds. She also on admission was found to have urinary retention, rich was placed but then removed today. Now TOV, however RN reports patient has not urinated since rich was out, is due for bladder scan and is not allowing any staff members to scan her belly, exam her stomach for distention etc. She is getting combative with staff as well.     2.5 IM Zyprexa to be given to help aide in medical management  Will re-assess in the next hour. Last QTC <400 on 5/19. Contacted by RN due to patient very uncooperative overnight. Patient is a 89 yo F hx Afib on Eliquis and s/p pacemaker, asthma (per old pulm note), anemia, HTN, hyponatremia, hypothyroidism, L hip surgeries (most recently 1/2025), BIBA for acute shortness of breath today, accompanied by dizziness and nausea, found to have acute hypoxic respiratory failure.    Patient overnight refusing all night time meds. She also on admission was found to have urinary retention, rich was placed but then removed today. Now TOV, however RN reports patient has not urinated since rich was out, is due for bladder scan and is not allowing any staff members to scan her belly, exam her stomach for distention etc. She is getting combative with staff as well.     2.5 IM Zyprexa to be given to help aide in medical management  Will re-assess in the next hour. Last QTC <400 on 5/19.      Addendum 5/22 0044:  RN reports patient very pleasant s/p 2.5 IM zyprexa.  RN was able to walk patient to the bathroom and she urinated on her own.  Will continue TOV and monitor.   RN to call with questions or concerns Contacted by RN due to patient very uncooperative overnight. Patient is a 89 yo F hx Afib on Eliquis and s/p pacemaker, asthma (per old pulm note), anemia, HTN, hyponatremia, hypothyroidism, L hip surgeries (most recently 1/2025), BIBA for acute shortness of breath today, accompanied by dizziness and nausea, found to have acute hypoxic respiratory failure.    Patient overnight refusing all night time meds. She also on admission was found to have urinary retention, rich was placed but then removed today. Now TOV, however RN reports patient has not urinated since rich was out, is due for bladder scan and is not allowing any staff members to scan her belly, exam her stomach for distention etc. She is getting combative with staff as well.     2.5 IM Zyprexa to be given to help aide in medical management  Will re-assess in the next hour. Last QTC <400 on 5/19.      Addendum 5/22 0044:  RN reports patient very pleasant s/p 2.5 IM zyprexa.  RN was able to walk patient to the bathroom and she urinated on her own x2  Will continue TOV and monitor.   RN to call with questions or concerns      Addendum 0244:  Patient c/o bladder pain  RN bladder scanned, patient retaining 1L, SCx1  Likely will need rich placed if continues to retain, however patient more mobile now, getting up with aides and RN, and urinating on her own a few times overnight.   Will send repeat UA; UC if applicable

## 2025-05-21 NOTE — DIETITIAN INITIAL EVALUATION ADULT - FACTORS AFF FOOD INTAKE
Patrick Ho MD  [facility] ITZ Page is a 72 change in mental status

## 2025-05-21 NOTE — PROGRESS NOTE ADULT - SUBJECTIVE AND OBJECTIVE BOX
[facility], [facility]                              [address] [address]                                                 PHONE: [phone]                                                 FAX: [phone]  ------------------------------------------------------------------------------------------------    Chief complaint: Shortness of breath    89y Female with past medical history as under BIBA for acute shortness of breath today accompanied by nausea and dizziness. Pt states she had a large meal and then was resting and suddenly felt short of breath and dizzy. She was noted to be in AF with RVR.    at bedside assists with medications and care. Pt reports feeling fatigued and tired. She follows with Dr. Quintana. She had permanent AF and maintained on anticoagulation.    RELEVANT PHYSICAL EXAM:  Neck: No obvious JVD  Cardiovascular: irregular S1, S2 tachy  Respiratory: Lungs clear to auscultation; no crepitations, no wheeze  Musculoskeletal: Trace edema    Vital Signs Last 24 Hrs  T(C): 36.5 (20 May 2025 04:27), Max: 37.1 (19 May 2025 19:45)  T(F): 97.7 (20 May 2025 04:27), Max: 98.7 (19 May 2025 19:45)  HR: 91 (20 May 2025 04:27) (91 - 110)  BP: 139/81 (20 May 2025 04:27) (117/96 - 176/96)  BP(mean): --  RR: 18 (20 May 2025 04:27) (18 - 24)  SpO2: 98% (20 May 2025 04:27) (95% - 100%)    Parameters below as of 20 May 2025 04:27  Patient On (Oxygen Delivery Method): nasal cannula  O2 Flow (L/min): 1       PAST MEDICAL & SURGICAL HISTORY:  Hypertension      Pacemaker      GERD (gastroesophageal reflux disease)      Afib      HLD (hyperlipidemia)      Asthma      H/O CHF      Hypothyroidism      Nephrolithiasis      Hyponatremia      H/O: hysterectomy      H/O laminectomy  lumbar      H/O hand surgery      History of back surgery      History of appendectomy      S/P tonsillectomy       Demerol HCl (Nausea)  shellfish (Rash)  Robaxin (Other)  codeine (Unknown)  sulfa drugs (Other)  Bactrim (Other)      LABS:                        10.5   4.58  )-----------( 131      ( 20 May 2025 04:19 )             31.6   05-20    132[L]  |  97  |  10.0  ----------------------------<  171[H]  4.9   |  18.0[L]  |  0.77    Ca    9.4      20 May 2025 04:19  Phos  3.4     05-20  Mg     1.3     05-20    TPro  7.5  /  Alb  4.2  /  TBili  1.3  /  DBili  x   /  AST  76[H]  /  ALT  32  /  AlkPhos  70  05-20    PT/INR - ( 19 May 2025 19:30 )   PT: 16.1 sec;   INR: 1.39 ratio         PTT - ( 19 May 2025 19:30 )  PTT:36.7 sec    RADIOLOGY & ADDITIONAL STUDIES: (reviewed)  CXR was independently visualized/reviewed and demonstrated: cardiomegaly    CARDIOLOGY TESTING:(reviewed)     ECG (Independent visualization): AF with RVR    TELEMETRY independently visualized/reviewed and demonstrated : AF with RVR    MEDICATIONS:(reviewed)  Home Medications:    MEDICATIONS  (PRN):  acetaminophen     Tablet .. 650 milliGRAM(s) Oral every 6 hours PRN Temp greater or equal to 38C (100.4F), Mild Pain (1 - 3)  aluminum hydroxide/magnesium hydroxide/simethicone Suspension 30 milliLiter(s) Oral every 4 hours PRN Dyspepsia  artificial  tears Solution 1 Drop(s) Both EYES every 4 hours PRN Dry Eyes  bisacodyl 5 milliGRAM(s) Oral every 12 hours PRN Constipation  melatonin 3 milliGRAM(s) Oral at bedtime PRN Insomnia  metoprolol tartrate Injectable 2.5 milliGRAM(s) IV Push every 6 hours PRN HR sustained >140 for more than 2 minutes  ondansetron Injectable 4 milliGRAM(s) IV Push every 8 hours PRN Nausea and/or Vomiting      MEDICATIONS  (STANDING):  albuterol/ipratropium for Nebulization 3 milliLiter(s) Nebulizer every 6 hours  apixaban 2.5 milliGRAM(s) Oral every 12 hours  ascorbic acid 500 milliGRAM(s) Oral daily  gabapentin 300 milliGRAM(s) Oral at bedtime  levothyroxine 50 MICROGram(s) Oral daily  methylPREDNISolone sodium succinate Injectable 40 milliGRAM(s) IV Push every 12 hours  metoprolol tartrate 25 milliGRAM(s) Oral two times a day  multivitamin 1 Tablet(s) Oral daily  pantoprazole    Tablet 40 milliGRAM(s) Oral before breakfast  senna 2 Tablet(s) Oral at bedtime  sodium chloride 1 Gram(s) Oral three times a day      ASSESSMENT AND PLAN:    89y Female with permanent AF on Eliquis, PPM, coronary calcifications, moderate MR, severe TR, COPD BIBA for acute shortness of breath today accompanied by nausea and dizziness.     Presentation probably consistent with acute on chronic systolic/diastolic HF  - trial of iv lasix now  - Has h/o chronic hyponatremia maintained on salt tablets-likely worsening volume status in the setting of moderate-severe valvular disease    AF  - Rates remain elevated  - Continue metoprolol. increase to 25 mg q6 pending Echo results  - Anticoagulation with Eliquis  - Telemetry monitoring    Shortness of breath  - Likely multifactorial related to AF, RVR, CIOPD and valvular disease    Plan d/w Medicine team    Additional history obtained from family [independent historian] and plan of care discussed in detail                                                      [facility], [facility]                              [address] [address]                                                 PHONE: [phone]                                                 FAX: [phone]  ------------------------------------------------------------------------------------------------    Chief complaint: Shortness of breath    89y Female with past medical history as under BIBA for acute shortness of breath today accompanied by nausea and dizziness. Pt states she had a large meal and then was resting and suddenly felt short of breath and dizzy. She was noted to be in AF with RVR.    at bedside assists with medications and care. Pt reports feeling fatigued and tired. She follows with Dr. Quintana. She had permanent AF and maintained on anticoagulation.    RECENT EVENTS    Feels better today   Still wheezing at PE    RELEVANT PHYSICAL EXAM:  Neck: No obvious JVD  Cardiovascular: irregular S1, S2 tachy  Respiratory: Lungs decrease BS bilaterally, positive wheeze  Musculoskeletal: Trace edema    Vital Signs Last 24 Hrs  T(C): 36.5 (20 May 2025 04:27), Max: 37.1 (19 May 2025 19:45)  T(F): 97.7 (20 May 2025 04:27), Max: 98.7 (19 May 2025 19:45)  HR: 91 (20 May 2025 04:27) (91 - 110)  BP: 139/81 (20 May 2025 04:27) (117/96 - 176/96)  BP(mean): --  RR: 18 (20 May 2025 04:27) (18 - 24)  SpO2: 98% (20 May 2025 04:27) (95% - 100%)    Parameters below as of 20 May 2025 04:27  Patient On (Oxygen Delivery Method): nasal cannula  O2 Flow (L/min): 1       PAST MEDICAL & SURGICAL HISTORY:  Hypertension      Pacemaker      GERD (gastroesophageal reflux disease)      Afib      HLD (hyperlipidemia)      Asthma      H/O CHF      Hypothyroidism      Nephrolithiasis      Hyponatremia      H/O: hysterectomy      H/O laminectomy  lumbar      H/O hand surgery      History of back surgery      History of appendectomy      S/P tonsillectomy       Demerol HCl (Nausea)  shellfish (Rash)  Robaxin (Other)  codeine (Unknown)  sulfa drugs (Other)  Bactrim (Other)      LABS:                        10.5   4.58  )-----------( 131      ( 20 May 2025 04:19 )             31.6   05-20    132[L]  |  97  |  10.0  ----------------------------<  171[H]  4.9   |  18.0[L]  |  0.77    Ca    9.4      20 May 2025 04:19  Phos  3.4     05-20  Mg     1.3     05-20    TPro  7.5  /  Alb  4.2  /  TBili  1.3  /  DBili  x   /  AST  76[H]  /  ALT  32  /  AlkPhos  70  05-20    PT/INR - ( 19 May 2025 19:30 )   PT: 16.1 sec;   INR: 1.39 ratio         PTT - ( 19 May 2025 19:30 )  PTT:36.7 sec    RADIOLOGY & ADDITIONAL STUDIES: (reviewed)  CXR was independently visualized/reviewed and demonstrated: cardiomegaly    CARDIOLOGY TESTING:(reviewed)     ECG (Independent visualization): AF with RVR    TELEMETRY independently visualized/reviewed and demonstrated : AF with RVR    MEDICATIONS:(reviewed)  Home Medications:    MEDICATIONS  (PRN):  acetaminophen     Tablet .. 650 milliGRAM(s) Oral every 6 hours PRN Temp greater or equal to 38C (100.4F), Mild Pain (1 - 3)  aluminum hydroxide/magnesium hydroxide/simethicone Suspension 30 milliLiter(s) Oral every 4 hours PRN Dyspepsia  artificial  tears Solution 1 Drop(s) Both EYES every 4 hours PRN Dry Eyes  bisacodyl 5 milliGRAM(s) Oral every 12 hours PRN Constipation  melatonin 3 milliGRAM(s) Oral at bedtime PRN Insomnia  metoprolol tartrate Injectable 2.5 milliGRAM(s) IV Push every 6 hours PRN HR sustained >140 for more than 2 minutes  ondansetron Injectable 4 milliGRAM(s) IV Push every 8 hours PRN Nausea and/or Vomiting      MEDICATIONS  (STANDING):  albuterol/ipratropium for Nebulization 3 milliLiter(s) Nebulizer every 6 hours  apixaban 2.5 milliGRAM(s) Oral every 12 hours  ascorbic acid 500 milliGRAM(s) Oral daily  gabapentin 300 milliGRAM(s) Oral at bedtime  levothyroxine 50 MICROGram(s) Oral daily  methylPREDNISolone sodium succinate Injectable 40 milliGRAM(s) IV Push every 12 hours  metoprolol tartrate 25 milliGRAM(s) Oral two times a day  multivitamin 1 Tablet(s) Oral daily  pantoprazole    Tablet 40 milliGRAM(s) Oral before breakfast  senna 2 Tablet(s) Oral at bedtime  sodium chloride 1 Gram(s) Oral three times a day    < from: TTE W or WO Ultrasound Enhancing Agent (05.20.25 @ 11:36) >  1. Left atrium is severely dilated.   2. Left ventricular cavity is normal in size. Abnormal septal motion consistent with right ventricular pacemaker and The interventricular septum is flattened in systole and diastole consistent with right ventricular pressure and volume overload. Left ventricular systolic function is normal with an ejection fraction visually estimated at 55 to 60 %.   3. The right atrium is severely dilated.   4. Severely enlarged right ventricular cavity size and mildly reduced right ventricular systolic function.   5. Mild mitral regurgitation.   6. Severe tricuspid regurgitation.   7. There is restriction of the septal leaflet of the tricuspid valve with the PPM wire.   8. Estimated pulmonary artery systolic pressure is 58 mmHg, consistent with moderate pulmonary hypertension.   9. No pericardial effusion seen.    ________________________________________________________________________________________  FINDINGS:     Left Ventricle:  The left ventricular cavity is normal in size. Abnormal (paradoxical) septal motion consistent with right ventricular pacemaker and The interventricular septum is flattened in systole and diastole consistent with right ventricular pressure and volume overload. Left ventricular systolic function is normal with an ejection fraction visually estimated at 55 to 60%. No left ventricular hypertrophy.     Right Ventricle:  The right ventricular cavity is severely enlarged in size and right ventricular systolic function is mildly reduced. A device lead is visualized.     Left Atrium:  The left atrium is severely dilated with an indexed volume of 56.41 ml/m².     Right Atrium:  The right atrium is severely dilated.     Interatrial Septum:  The interatrial septum appears intact.     Aortic Valve:  The aortic valve appears trileaflet. There is calcification of the aortic valve leaflets. There is no evidence of aortic regurgitation.     Mitral Valve:  There is mitral valve thickening of the anteriorand posterior leaflets. There is calcification of the mitral valve annulus. There is mild mitral regurgitation.     Tricuspid Valve:  There is restriction of the septal leaflet of the tricuspid valve with the PPM wire. There is severe tricuspid regurgitation. Estimated pulmonary artery systolic pressure is 58 mmHg, consistent with moderate pulmonary hypertension.     Pulmonic Valve:  Structurally normal pulmonic valve with normal leaflet excursion. There is trace pulmonic regurgitation.     Pulmonary Artery:  The main pulmonary artery is normal in size, origin, and position.     Aorta:  The aortic root appears normal in size.     Pericardium:  No pericardial effusion seen.     Systemic Veins:  The inferior vena cava is dilated (dilated >2.1cm) with abnormal inspiratory collapse (abnormal <50%) consistent with elevated right atrial pressure (~15, range 10-20mmHg).  ____________________________________________________________________  QUANTITATIVE DATA:  Left Ventricle Measurements: (Indexed to BSA)       < end of copied text >      ASSESSMENT AND PLAN:    89y Female with permanent AF on Eliquis, PPM, coronary calcifications, moderate MR, severe TR, COPD BIBA for acute shortness of breath today accompanied by nausea and dizziness.     Presentation probably consistent with acute on chronic systolic/diastolic HF    - CXR today   - Cont IV iv lasix for now  - Has h/o chronic hyponatremia maintained on salt tablets-likely worsening volume status in the setting of moderate-severe valvular disease    AF    - Rates remain elevated  - Continue metoprolol  25 mg q6 - Anticoagulation with Eliquis  - Telemetry monitoring    Shortness of breath  - Likely multifactorial related to AF, RVR, CIOPD and valvular disease    Additional history obtained from family [independent historian] and plan of care discussed in detail

## 2025-05-21 NOTE — DIETITIAN INITIAL EVALUATION ADULT - OTHER INFO
Pt is a 89 year old Female with hx Afib on Eliquis and s/p pacemaker, asthma (per old pulm note), anemia, HTN, hyponatremia, hypothyroidism, L hip surgeries (most recently 1/2025), BIBA for acute shortness of breath today, accompanied by dizziness and nausea, found to have acute hypoxic respiratory failure

## 2025-05-21 NOTE — DIETITIAN INITIAL EVALUATION ADULT - PERTINENT MEDS FT
MEDICATIONS  (STANDING):  ascorbic acid 500 milliGRAM(s) Oral daily  metoprolol tartrate 25 milliGRAM(s) Oral every 6 hours  multivitamin 1 Tablet(s) Oral daily  senna 2 Tablet(s) Oral at bedtime  spironolactone 25 milliGRAM(s) Oral daily    MEDICATIONS  (PRN):  melatonin 3 milliGRAM(s) Oral at bedtime PRN Insomnia  ondansetron Injectable 4 milliGRAM(s) IV Push every 8 hours PRN Nausea and/or Vomiting

## 2025-05-21 NOTE — DISCHARGE NOTE PROVIDER - HOSPITAL COURSE
89 yo F hx Afib on Eliquis and s/p pacemaker, asthma (per old pulm note), anemia, HTN, hyponatremia, hypothyroidism, L hip surgeries (most recently 1/2025), BIBA for acute shortness of breath today, accompanied by dizziness and nausea, found to have acute hypoxic respiratory failure. Cronic Atrial fib with RVR possible CHF exacerbation.  EF 55 to 60%, cardio consulted.  Metoprolol increased to 25 q6h, on Eliquis, received diuretics. Asthma cronic, no exacerbation, Solumedrol Dced, on nebulizers. Hyponatremia improved with salt tablets, added Na bicarbonate. Anemia MCV 99, chronic,  Hgb at baseline. TSH is normal, on levothyroxine.        Dispo:. Home w/PT   89 y/o F with PMH of Afib on Eliquis, asthma, anemia, HTN, hyponatremia, hypothyroidism brought in for shortness of breath admitted for acute hypoxic respiratory failure secondary to CHF exacerbation. Patient saturating well on nasal cannula. Cardiology consulted and the patient received diuretics. The patient is hemodynamically stable for discharge with appropriate follow up.

## 2025-05-21 NOTE — DISCHARGE NOTE NURSING/CASE MANAGEMENT/SOCIAL WORK - NSDCVIVACCINE_GEN_ALL_CORE_FT
influenza, high-dose, quadrivalent; 20-Oct-2023 13:25; Jesus Esparza (RN); [employer]; [id number] (Exp. Date: 20-Jun-2024); IntraMuscular; Deltoid Left.; 0.7 milliLiter(s); VIS (VIS Published: 06-Aug-2021, VIS Presented: 20-Oct-2023);

## 2025-05-21 NOTE — DISCHARGE NOTE PROVIDER - CARE PROVIDER_API CALL
Sheyla Martínez  Family Medicine  [address], [address]  [place] [address]  Phone: [phone]  Fax: [phone]  Scheduled Appointment: 05/29/2025 12:15 PM    Milton Veliz  Cardiology  [address] [place] [id number]  Phone: [phone]  Fax: [phone]  Follow Up Time: 2 weeks

## 2025-05-21 NOTE — PROGRESS NOTE ADULT - ASSESSMENT
89 yo F hx Afib on Eliquis and s/p pacemaker, asthma (per old pulm note), anemia, HTN, hyponatremia, hypothyroidism, L hip surgeries (most recently 1/2025), BIBA for acute shortness of breath today, accompanied by dizziness and nausea, found to have acute hypoxic respiratory failure    1-A Hypoxic resp Failure due to CHF   improved hypoxia pulse ox on RA and ambulation ordered   no oxygen   repeat CXR done no sign of fluid overload   trace bilateral effusion     2- HFpEF , right sided herat failure   due to severe TR   cont aldactone home dose   added lasix 20 mg daily   monitor weight , intake output     3-Cronic Atrial fib with RVR   rate better controlled   cont metoprolol dose increased   cont AC     4-Urinary Retention   rich in place   will remove in am prior DC void trial   OOB ambulate to bathroom     5-Asthma   cronic   no exacerbation no wheezing   will DC solumedrol   cont nebulizer     6--Hyponatremia   stable   at home on na tablets since her hip surgery in Jan 2025     7-Anemia MCV 99, chronic  - Hgb at baseline      6-Hypothyroid  TSH is normal  cont levothyroxine       VTE ppx: on eliquis    Diet: low salt     Dispo: in 1-2 days     d/w familh ,  and pt at the bedside     Full code

## 2025-05-21 NOTE — DISCHARGE NOTE PROVIDER - NSDCMRMEDTOKEN_GEN_ALL_CORE_FT
acetaminophen 325 mg oral tablet: 3 tab(s) orally every 8 hours  apixaban 2.5 mg oral tablet: 1 tab(s) orally 2 times a day  bisacodyl 5 mg oral delayed release tablet: 1 tab(s) orally every 12 hours As needed Constipation  Calcium 600+D 600 mg-5 mcg (200 intl units) oral tablet: 1 tab(s) orally once a day  gabapentin 300 mg oral capsule: 1 cap(s) orally once a day (at bedtime)  levothyroxine 50 mcg (0.05 mg) oral tablet: 1 tab(s) orally once a day  Metoprolol Tartrate 25 mg oral tablet: 1 tab(s) orally 2 times a day  Mobic 7.5 mg oral tablet: 1 tab(s) orally 2 times a day  Multiple Vitamins oral capsule: 1 cap(s) orally once a day  multivitamin daily:   pantoprazole 40 mg oral delayed release tablet: 1 tab(s) orally once a day (before a meal)  Senna S 50 mg-8.6 mg oral tablet: 2 tab(s) orally once a day  sodium chloride 1 g oral tablet: 1 tab(s) orally 3 times a day  spironolactone 25 mg oral tablet: 1 tab(s) orally once a day  traMADol 50 mg oral tablet: 1 tab(s) orally every 6 hours MDD: 4  Vitamin C 500 mg oral tablet: 1 tab(s) orally once a day   acetaminophen 325 mg oral tablet: 3 tab(s) orally every 8 hours  apixaban 2.5 mg oral tablet: 1 tab(s) orally 2 times a day  bisacodyl 5 mg oral delayed release tablet: 1 tab(s) orally every 12 hours As needed Constipation  Calcium 600+D 600 mg-5 mcg (200 intl units) oral tablet: 1 tab(s) orally once a day  furosemide 20 mg oral tablet: 1 tab(s) orally once a day  gabapentin 300 mg oral capsule: 1 cap(s) orally once a day (at bedtime)  levothyroxine 50 mcg (0.05 mg) oral tablet: 1 tab(s) orally once a day  metoprolol tartrate 25 mg oral tablet: 1 tab(s) orally every 6 hours  Multiple Vitamins oral capsule: 1 cap(s) orally once a day  multivitamin daily:   pantoprazole 40 mg oral delayed release tablet: 1 tab(s) orally once a day (before a meal)  Senna S 50 mg-8.6 mg oral tablet: 2 tab(s) orally once a day  sodium chloride 1 g oral tablet: 1 tab(s) orally 3 times a day  spironolactone 25 mg oral tablet: 1 tab(s) orally once a day  Vitamin C 500 mg oral tablet: 1 tab(s) orally once a day

## 2025-05-21 NOTE — DIETITIAN INITIAL EVALUATION ADULT - ORAL INTAKE PTA/DIET HISTORY
Pt very confused during visit (looking for her family). Pt unable to provide nutrition history at time of visit. Diet education deferred secondary to confusion.

## 2025-05-21 NOTE — DIETITIAN INITIAL EVALUATION ADULT - PERTINENT LABORATORY DATA
Patient Name: Yanelis Youssef   MRN: [id number]    Trudy Jackson is a 52 y.o. female who presents with the following:     Cervical Cancer Screening: up to date. Colon Cancer Screening: refer placed to GI. Breast Cancer Screening: up to date  DEXA:   Hep C: neg     CAD risk factors:  HTN: wnl  BP Readings from Last 3 Encounters:   22 100/64   20 110/66     Lipid: wnl  Lab Results   Component Value Date/Time    Cholesterol, total 207 (H) 2022 08:22 AM    HDL Cholesterol 90 2022 08:22 AM    LDL, calculated 96.2 2022 08:22 AM    VLDL, calculated 20.8 2022 08:22 AM    Triglyceride 104 2022 08:22 AM    CHOL/HDL Ratio 2.3 2022 08:22 AM     DM: wnl  Lab Results   Component Value Date/Time    Hemoglobin A1c 5.4 2022 08:22 AM     The 10-year ASCVD risk score (Rasheeda VAIL, et al., 2019) is: 0.4%    Values used to calculate the score:      Age: 52 years      Sex: Female      Is Non- : No      Diabetic: No      Tobacco smoker: No      Systolic Blood Pressure: 436 mmHg      Is BP treated: No      HDL Cholesterol: 90 MG/DL      Total Cholesterol: 207 MG/DL    Hx of hypothyroidism, on levothyroxine. Has felt more tired with some weight gain. Father  of a heart attack at age 48. Pt denies CP, SOB, or prior cardiac workup. Review of Systems   Constitutional:  Negative for fever, malaise/fatigue and weight loss. Respiratory:  Negative for cough, hemoptysis, shortness of breath and wheezing. Cardiovascular:  Negative for chest pain, palpitations, leg swelling and PND. Gastrointestinal:  Negative for abdominal pain, constipation, diarrhea, nausea and vomiting. The patient's medications, allergies, past medical history, surgical history, family history and social history were reviewed and updated where appropriate.       Current Outpatient Medications:     levothyroxine (SYNTHROID) 100 mcg tablet, Take 1 Tablet by mouth Daily (before breakfast). , Disp: 90 Tablet, Rfl: 2    pimecrolimus (ELIDEL) 1 % topical cream, , Disp: , Rfl:     triamcinolone acetonide (KENALOG) 0.1 % topical cream, 1 thin application to affected area under arms, Disp: , Rfl:     No Known Allergies    OBJECTIVE    Visit Vitals  /64 (BP 1 Location: Left upper arm, BP Patient Position: Sitting, BP Cuff Size: Adult)   Pulse 79   Temp 97.9 °F (36.6 °C) (Temporal)   Resp 16   Ht 5' 7\" (1.702 m)   Wt 149 lb 3.2 oz (67.7 kg)   SpO2 98%   BMI 23.37 kg/m²       Physical Exam  Constitutional:       General: She is not in acute distress. Appearance: She is not diaphoretic. HENT:      Head: Normocephalic. Right Ear: External ear normal.      Left Ear: External ear normal.   Eyes:      Conjunctiva/sclera: Conjunctivae normal.      Pupils: Pupils are equal, round, and reactive to light. Cardiovascular:      Rate and Rhythm: Normal rate and regular rhythm. Heart sounds: Normal heart sounds. No murmur heard. No friction rub. No gallop. Pulmonary:      Effort: Pulmonary effort is normal. No respiratory distress. Breath sounds: Normal breath sounds. No wheezing or rales. Abdominal:      General: Bowel sounds are normal. There is no distension. Palpations: Abdomen is soft. Tenderness: There is no abdominal tenderness. There is no guarding or rebound. Skin:     General: Skin is warm and dry. Neurological:      Mental Status: She is alert. ASSESSMENT AND PLAN  Nhung Wellington is a 52 y.o. female who presents today for:    1. Routine general medical examination at health care facility  Reviewed age appropriate screening tests as recommended by the [employer] Preventive Services Database with patient today. 2. Hypothyroidism, unspecified type  Will increase dose. - levothyroxine (SYNTHROID) 100 mcg tablet; Take 1 Tablet by mouth Daily (before breakfast). Dispense: 90 Tablet; Refill: 2  - TSH 3RD GENERATION;  Future  - T4 (THYROXINE); Future    3. Family history of early CAD  - REFERRAL TO CARDIOLOGY       Medications Discontinued During This Encounter   Medication Reason    meclizine (ANTIVERT) 25 mg tablet LIST CLEANUP    levothyroxine (SYNTHROID) 88 mcg tablet REORDER       Follow-up and Dispositions    Return in about 2 months (around 11/30/2022) for lab check. Treatment risks/benefits/costs/interactions/alternatives discussed with patient. Advised patient to call back or return to office if symptoms worsen/change/persist. If patient cannot reach us or should anything more severe/urgent arise he/she should proceed directly to the nearest emergency department. Discussed expected course/resolution/complications of diagnosis in detail with patient. Patient expressed understanding with the diagnosis and plan. This dictation may have been completed with Dragon, the computer voice recognition software. Unanticipated grammatical, syntax, homophones, and other interpretive errors are sometimes inadvertently transcribed by the computer software. Please disregard any errors that have escaped final proofreading. Roxanne Villalpando M.D. 05-21    131[L]  |  94[L]  |  16.3  ----------------------------<  96  4.8   |  21.0[L]  |  0.83    Ca    9.5      21 May 2025 02:25  Phos  2.5     05-21  Mg     1.7     05-21    TPro  7.6  /  Alb  4.2  /  TBili  1.0  /  DBili  x   /  AST  57[H]  /  ALT  35[H]  /  AlkPhos  67  05-21  A1C with Estimated Average Glucose Result: 5.2 % (12-13-24 @ 12:55)

## 2025-05-22 VITALS
HEART RATE: 82 BPM | SYSTOLIC BLOOD PRESSURE: 115 MMHG | TEMPERATURE: 98 F | RESPIRATION RATE: 18 BRPM | OXYGEN SATURATION: 99 % | DIASTOLIC BLOOD PRESSURE: 70 MMHG

## 2025-05-22 LAB
ANION GAP SERPL CALC-SCNC: 12 MMOL/L — SIGNIFICANT CHANGE UP (ref 5–17)
APPEARANCE UR: CLEAR — SIGNIFICANT CHANGE UP
BILIRUB UR-MCNC: NEGATIVE — SIGNIFICANT CHANGE UP
BUN SERPL-MCNC: 18.1 MG/DL — SIGNIFICANT CHANGE UP (ref 8–20)
CALCIUM SERPL-MCNC: 9.4 MG/DL — SIGNIFICANT CHANGE UP (ref 8.4–10.5)
CHLORIDE SERPL-SCNC: 91 MMOL/L — LOW (ref 96–108)
CO2 SERPL-SCNC: 26 MMOL/L — SIGNIFICANT CHANGE UP (ref 22–29)
COLOR SPEC: YELLOW — SIGNIFICANT CHANGE UP
CREAT SERPL-MCNC: 0.81 MG/DL — SIGNIFICANT CHANGE UP (ref 0.5–1.3)
DIFF PNL FLD: ABNORMAL
EGFR: 67 ML/MIN/1.73M2 — SIGNIFICANT CHANGE UP
EGFR: 67 ML/MIN/1.73M2 — SIGNIFICANT CHANGE UP
GLUCOSE SERPL-MCNC: 109 MG/DL — HIGH (ref 70–99)
GLUCOSE UR QL: NEGATIVE MG/DL — SIGNIFICANT CHANGE UP
HCT VFR BLD CALC: 33.5 % — LOW (ref 34.5–45)
HGB BLD-MCNC: 11.7 G/DL — SIGNIFICANT CHANGE UP (ref 11.5–15.5)
KETONES UR QL: NEGATIVE MG/DL — SIGNIFICANT CHANGE UP
LEUKOCYTE ESTERASE UR-ACNC: NEGATIVE — SIGNIFICANT CHANGE UP
MAGNESIUM SERPL-MCNC: 1.5 MG/DL — LOW (ref 1.6–2.6)
MCHC RBC-ENTMCNC: 33.1 PG — SIGNIFICANT CHANGE UP (ref 27–34)
MCHC RBC-ENTMCNC: 34.9 G/DL — SIGNIFICANT CHANGE UP (ref 32–36)
MCV RBC AUTO: 94.9 FL — SIGNIFICANT CHANGE UP (ref 80–100)
NITRITE UR-MCNC: NEGATIVE — SIGNIFICANT CHANGE UP
NRBC # BLD AUTO: 0 K/UL — SIGNIFICANT CHANGE UP (ref 0–0)
NRBC # FLD: 0 K/UL — SIGNIFICANT CHANGE UP (ref 0–0)
NRBC BLD AUTO-RTO: 0 /100 WBCS — SIGNIFICANT CHANGE UP (ref 0–0)
NT-PROBNP SERPL-SCNC: 4516 PG/ML — HIGH (ref 0–300)
PH UR: 7.5 — SIGNIFICANT CHANGE UP (ref 5–8)
PLATELET # BLD AUTO: 163 K/UL — SIGNIFICANT CHANGE UP (ref 150–400)
PMV BLD: 10.2 FL — SIGNIFICANT CHANGE UP (ref 7–13)
POTASSIUM SERPL-MCNC: 3.7 MMOL/L — SIGNIFICANT CHANGE UP (ref 3.5–5.3)
POTASSIUM SERPL-SCNC: 3.7 MMOL/L — SIGNIFICANT CHANGE UP (ref 3.5–5.3)
PROT UR-MCNC: 30 MG/DL
RBC # BLD: 3.53 M/UL — LOW (ref 3.8–5.2)
RBC # FLD: 14 % — SIGNIFICANT CHANGE UP (ref 10.3–14.5)
SODIUM SERPL-SCNC: 129 MMOL/L — LOW (ref 135–145)
SP GR SPEC: 1.01 — SIGNIFICANT CHANGE UP (ref 1–1.03)
UROBILINOGEN FLD QL: 1 MG/DL — SIGNIFICANT CHANGE UP (ref 0.2–1)
WBC # BLD: 5.97 K/UL — SIGNIFICANT CHANGE UP (ref 3.8–10.5)
WBC # FLD AUTO: 5.97 K/UL — SIGNIFICANT CHANGE UP (ref 3.8–10.5)

## 2025-05-22 PROCEDURE — 99239 HOSP IP/OBS DSCHRG MGMT >30: CPT

## 2025-05-22 RX ORDER — FUROSEMIDE 10 MG/ML
1 INJECTION INTRAMUSCULAR; INTRAVENOUS
Qty: 30 | Refills: 0
Start: 2025-05-22 | End: 2025-06-20

## 2025-05-22 RX ORDER — APIXABAN 2.5 MG/1
1 TABLET, FILM COATED ORAL
Qty: 60 | Refills: 0
Start: 2025-05-22 | End: 2025-06-20

## 2025-05-22 RX ORDER — METOPROLOL SUCCINATE 50 MG/1
1 TABLET, EXTENDED RELEASE ORAL
Qty: 120 | Refills: 0
Start: 2025-05-22 | End: 2025-06-20

## 2025-05-22 RX ORDER — GABAPENTIN 400 MG/1
1 CAPSULE ORAL
Qty: 30 | Refills: 0
Start: 2025-05-22 | End: 2025-06-20

## 2025-05-22 RX ADMIN — Medication 1 DOSE(S): at 08:28

## 2025-05-22 RX ADMIN — Medication 25 MILLIGRAM(S): at 09:45

## 2025-05-22 RX ADMIN — METOPROLOL SUCCINATE 25 MILLIGRAM(S): 50 TABLET, EXTENDED RELEASE ORAL at 13:26

## 2025-05-22 RX ADMIN — Medication 50 MICROGRAM(S): at 09:45

## 2025-05-22 RX ADMIN — APIXABAN 2.5 MILLIGRAM(S): 2.5 TABLET, FILM COATED ORAL at 06:59

## 2025-05-22 RX ADMIN — Medication 40 MILLIGRAM(S): at 09:45

## 2025-05-22 RX ADMIN — Medication 500 MILLIGRAM(S): at 09:45

## 2025-05-22 RX ADMIN — Medication 1 TABLET(S): at 09:45

## 2025-05-22 RX ADMIN — METOPROLOL SUCCINATE 25 MILLIGRAM(S): 50 TABLET, EXTENDED RELEASE ORAL at 06:59

## 2025-05-22 RX ADMIN — POLYETHYLENE GLYCOL 3350 17 GRAM(S): 17 POWDER, FOR SOLUTION ORAL at 09:45

## 2025-05-22 NOTE — PROGRESS NOTE ADULT - SUBJECTIVE AND OBJECTIVE BOX
[facility]                              [address] [place] [address]                                                 PHONE: [phone]                                                 FAX: [phone]  -----------------------------------------------------------------------------------------------  Pt seen and examined. FU for  shortness of breath     Overnight events/Complaints: Pt sitting up in chair. Feels well. Reportedly had urinary retention holding her discharge. AF rates overall controlled.    RELEVANT PHYSICAL EXAM:  Neck: No obvious JVD  Cardiovascular: irregular S1, S2  Respiratory: Lungs clear to auscultation; no crepitations, no wheeze  Musculoskeletal: No edema    Vital Signs Last 24 Hrs  T(C): 36.6 (22 May 2025 07:15), Max: 36.7 (21 May 2025 21:02)  T(F): 97.8 (22 May 2025 07:15), Max: 98.1 (21 May 2025 21:02)  HR: 86 (22 May 2025 08:30) (74 - 92)  BP: 149/79 (22 May 2025 07:15) (124/62 - 166/95)  BP(mean): 95 (21 May 2025 18:52) (95 - 95)  RR: 18 (22 May 2025 07:15) (17 - 18)  SpO2: 97% (22 May 2025 08:30) (96% - 100%)    Parameters below as of 22 May 2025 08:30  Patient On (Oxygen Delivery Method): room air      I&O's Summary    21 May 2025 07:01  -  22 May 2025 07:00  --------------------------------------------------------  IN: 480 mL / OUT: 3770 mL / NET: -3290 mL    22 May 2025 07:01  -  22 May 2025 10:21  --------------------------------------------------------  IN: 0 mL / OUT: 300 mL / NET: -300 mL            LABS:                        11.7   5.97  )-----------( 163      ( 22 May 2025 08:26 )             33.5    05-22    129[L]  |  91[L]  |  18.1  ----------------------------<  109[H]  3.7   |  26.0  |  0.81    Ca    9.4      22 May 2025 08:26  Phos  2.5     05-21  Mg     1.5     05-22    TPro  7.6  /  Alb  4.2  /  TBili  1.0  /  DBili  x   /  AST  57[H]  /  ALT  35[H]  /  AlkPhos  67  05-21     RADIOLOGY & ADDITIONAL STUDIES: (reviewed)  CXR was independently visualized/reviewed and demonstrated: cardiomegaly    CARDIOLOGY TESTING:(reviewed)     ECG (Independent visualization): AF with RVR    TELEMETRY independently visualized/reviewed and demonstrated : AF with RVR    12 lead EKG independently visualized/reviewed  and demonstrated AF    ECHOCARDIOGRAM independently visualized/reviewed and demonstrated :     CARDIAC CATH:    MEDICATIONS:(reviewed)    MEDICATIONS  (PRN):  acetaminophen     Tablet .. 650 milliGRAM(s) Oral every 6 hours PRN Temp greater or equal to 38C (100.4F), Mild Pain (1 - 3)  albuterol    90 MICROgram(s) HFA Inhaler 2 Puff(s) Inhalation every 6 hours PRN Shortness of Breath and/or Wheezing  aluminum hydroxide/magnesium hydroxide/simethicone Suspension 30 milliLiter(s) Oral every 4 hours PRN Dyspepsia  artificial  tears Solution 1 Drop(s) Both EYES every 4 hours PRN Dry Eyes  bisacodyl 5 milliGRAM(s) Oral every 12 hours PRN Constipation  melatonin 3 milliGRAM(s) Oral at bedtime PRN Insomnia  metoprolol tartrate Injectable 2.5 milliGRAM(s) IV Push every 6 hours PRN HR sustained >140 for more than 2 minutes  ondansetron Injectable 4 milliGRAM(s) IV Push every 8 hours PRN Nausea and/or Vomiting      MEDICATIONS  (STANDING):  apixaban 2.5 milliGRAM(s) Oral every 12 hours  ascorbic acid 500 milliGRAM(s) Oral daily  fluticasone propionate/ salmeterol 250-50 MICROgram(s) Diskus 1 Dose(s) Inhalation two times a day  furosemide    Tablet 20 milliGRAM(s) Oral <User Schedule>  gabapentin 300 milliGRAM(s) Oral at bedtime  levothyroxine 50 MICROGram(s) Oral daily  metoprolol tartrate 25 milliGRAM(s) Oral every 6 hours  multivitamin 1 Tablet(s) Oral daily  pantoprazole    Tablet 40 milliGRAM(s) Oral before breakfast  polyethylene glycol 3350 17 Gram(s) Oral daily  senna 2 Tablet(s) Oral at bedtime  spironolactone 25 milliGRAM(s) Oral daily      ASSESSMENT AND PLAN:    89y Female with     CAD  - Continue ASA    HTN  - Continue     HLD/dyslipidemia  - Continue statin    AF  - Rates controlled  - Anticoagulation, Not a candidate for anticoagulation given age and fall risk  - Telemetry monitoring    Shortness of breath  - Likely multifactorial    Edema    Type 2 MI secondary to **  - Echo ordered to assess LV function/wall motion and valves  - Trend serial cardiac enzymes    Acute on chronic kidney disease stage **  -Avoid nephrotoxic drugs  -Monitor creatinine closely  -IV fluids as tolerated    Plan d/w Medicine team                                              [facility]                              [address] [place] [address]                                                 PHONE: [phone]                                                 FAX: [phone]  -----------------------------------------------------------------------------------------------  Pt seen and examined. FU for  shortness of breath     Overnight events/Complaints: Pt sitting up in chair. Feels well. Reportedly had urinary retention holding her discharge. AF rates overall controlled.    RELEVANT PHYSICAL EXAM:  Neck: No obvious JVD  Cardiovascular: irregular S1, S2  Respiratory: Lungs clear to auscultation; no crepitations, no wheeze  Musculoskeletal: No edema    Vital Signs Last 24 Hrs  T(C): 36.6 (22 May 2025 07:15), Max: 36.7 (21 May 2025 21:02)  T(F): 97.8 (22 May 2025 07:15), Max: 98.1 (21 May 2025 21:02)  HR: 86 (22 May 2025 08:30) (74 - 92)  BP: 149/79 (22 May 2025 07:15) (124/62 - 166/95)  BP(mean): 95 (21 May 2025 18:52) (95 - 95)  RR: 18 (22 May 2025 07:15) (17 - 18)  SpO2: 97% (22 May 2025 08:30) (96% - 100%)    Parameters below as of 22 May 2025 08:30  Patient On (Oxygen Delivery Method): room air      I&O's Summary    21 May 2025 07:01  -  22 May 2025 07:00  --------------------------------------------------------  IN: 480 mL / OUT: 3770 mL / NET: -3290 mL    22 May 2025 07:01  -  22 May 2025 10:21  --------------------------------------------------------  IN: 0 mL / OUT: 300 mL / NET: -300 mL            LABS:                        11.7   5.97  )-----------( 163      ( 22 May 2025 08:26 )             33.5    05-22    129[L]  |  91[L]  |  18.1  ----------------------------<  109[H]  3.7   |  26.0  |  0.81    Ca    9.4      22 May 2025 08:26  Phos  2.5     05-21  Mg     1.5     05-22    TPro  7.6  /  Alb  4.2  /  TBili  1.0  /  DBili  x   /  AST  57[H]  /  ALT  35[H]  /  AlkPhos  67  05-21     RADIOLOGY & ADDITIONAL STUDIES: (reviewed)  CXR was independently visualized/reviewed and demonstrated: cardiomegaly    CARDIOLOGY TESTING:(reviewed)     ECG (Independent visualization): AF with RVR    TELEMETRY independently visualized/reviewed and demonstrated : AF with RVR    12 lead EKG independently visualized/reviewed  and demonstrated AF    ECHOCARDIOGRAM independently visualized/reviewed and demonstrated :    1. Left atrium is severely dilated.   2. Left ventricular cavity is normal in size. Abnormal septal motion consistent with right ventricular pacemaker and The interventricular septum is flattened in systole and diastole consistent with right ventricular pressure and volume overload. Left ventricular systolic function is normal with an ejection fraction visually estimated at 55 to 60 %.   3. The right atrium is severely dilated.   4. Severely enlarged right ventricular cavity size and mildly reduced right ventricular systolic function.   5. Mild mitral regurgitation.   6. Severe tricuspid regurgitation.   7. There is restriction of the septal leaflet of the tricuspid valve with the PPM wire.   8. Estimated pulmonary artery systolic pressure is 58 mmHg, consistent with moderate pulmonary hypertension.   9. No pericardial effusion seen.    MEDICATIONS:(reviewed)    MEDICATIONS  (PRN):  acetaminophen     Tablet .. 650 milliGRAM(s) Oral every 6 hours PRN Temp greater or equal to 38C (100.4F), Mild Pain (1 - 3)  albuterol    90 MICROgram(s) HFA Inhaler 2 Puff(s) Inhalation every 6 hours PRN Shortness of Breath and/or Wheezing  aluminum hydroxide/magnesium hydroxide/simethicone Suspension 30 milliLiter(s) Oral every 4 hours PRN Dyspepsia  artificial  tears Solution 1 Drop(s) Both EYES every 4 hours PRN Dry Eyes  bisacodyl 5 milliGRAM(s) Oral every 12 hours PRN Constipation  melatonin 3 milliGRAM(s) Oral at bedtime PRN Insomnia  metoprolol tartrate Injectable 2.5 milliGRAM(s) IV Push every 6 hours PRN HR sustained >140 for more than 2 minutes  ondansetron Injectable 4 milliGRAM(s) IV Push every 8 hours PRN Nausea and/or Vomiting      MEDICATIONS  (STANDING):  apixaban 2.5 milliGRAM(s) Oral every 12 hours  ascorbic acid 500 milliGRAM(s) Oral daily  fluticasone propionate/ salmeterol 250-50 MICROgram(s) Diskus 1 Dose(s) Inhalation two times a day  furosemide    Tablet 20 milliGRAM(s) Oral <User Schedule>  gabapentin 300 milliGRAM(s) Oral at bedtime  levothyroxine 50 MICROGram(s) Oral daily  metoprolol tartrate 25 milliGRAM(s) Oral every 6 hours  multivitamin 1 Tablet(s) Oral daily  pantoprazole    Tablet 40 milliGRAM(s) Oral before breakfast  polyethylene glycol 3350 17 Gram(s) Oral daily  senna 2 Tablet(s) Oral at bedtime  spironolactone 25 milliGRAM(s) Oral daily      ASSESSMENT AND PLAN:    89y Female with permanent AF on Eliquis, PPM, coronary calcifications, moderate MR, severe TR, COPD BIBA for acute shortness of breath today accompanied by nausea and dizziness.     Presentation probably consistent with acute on chronic systolic/diastolic HF  - Lasix 20 mg daily on discharge  - Has h/o chronic hyponatremia maintained on salt tablets-likely worsening volume status in the setting of moderate-severe valvular disease    AF  - Continue metoprolol  25 mg q6 - Anticoagulation with Eliquis  - Telemetry monitoring    Additional history obtained from family [independent historian] and plan of care discussed in detail    No further inpt cardiac work-up needed. Pls recall if any qns/concerns.     Will arrange outpt FU post discharge.

## 2025-05-22 NOTE — PROVIDER CONTACT NOTE (OTHER) - SITUATION
Pt bladder scanned prior to discharged, 700cc still remaining in bladder. Pt  refusing catheter, MD Acevedo notified. As per provider okay to proceed with discharge.
Patient has hx of dementia and episodes of confusion especially overnight. When any staff enters the room, patient screams for staff to get out and leaver her alone.

## 2025-05-22 NOTE — PROGRESS NOTE ADULT - REASON FOR ADMISSION
Asthma exacerbation  Afib with RVR
Afib with RVR
Asthma exacerbation  Afib with RVR
Asthma exacerbation  Afib with RVR

## 2025-05-25 LAB
CULTURE RESULTS: SIGNIFICANT CHANGE UP
SPECIMEN SOURCE: SIGNIFICANT CHANGE UP

## 2025-05-28 NOTE — CHART NOTE - NSCHARTNOTEFT_GEN_A_CORE
Post-Discharge Medication Review: Completed	    Patient's preferred pharmacy was updated in OMR: [facility] 	  	  Caregiver (Jose Beatty,  ) contacted to offer medication counseling post-discharge. Medication reconciliation completed. Per Caregiver, medications include:	  	  1.	acetaminophen 325 mg oral tablet 3 tab(s) orally every 8 hours  2.	apixaban 2.5 mg oral tablet 1 tab(s) orally 2 times a day  3.	bisacodyl 5 mg oral delayed release tablet 1 tab(s) orally every 12 hours As needed Constipation  4.	Calcium 600+D 600 mg-5 mcg (200 intl units) oral tablet 1 tab(s) orally once a day  5.	furosemide 20 mg oral tablet 1 tab(s) orally once a day  6.	levothyroxine 50 mcg (0.05 mg) oral tablet 1 tab(s) orally once a day  7.	Multiple Vitamins oral capsule 1 cap(s) orally once a day  8.	multivitamin daily  9.	Senna S 50 mg-8.6 mg oral tablet 2 tab(s) orally once a da  10.	spironolactone 25 mg oral tablet 1 tab(s) orally once a day  11.	Vitamin C 500 mg oral tablet 1 tab(s) orally once a day   	  Medications added to OMR (updated per discussion with Caregiver):	  12.	gabapentin 100 mg daily at bedtimes  13.	gabapentin 200 mg daily in the morning   14.	 metoprolol tartrate 25 mg oral tablet 1 tab(s) orally every 8 hours   15.	Sodium Chloride 500 mg tablets 2 tablets by mouth BID    	  Medications removed from OMR (updated per discussion with Caregiver):	  1.	gabapentin 300 mg oral capsule 1 cap(s) orally once a day (at bedtime)  2.	metoprolol tartrate 25 mg oral tablet 1 tab(s) orally every 6 hours  3.	pantoprazole 40 mg oral delayed release tablet 1 tab(s) orally once a day (before a meal)  4.	sodium chloride 1 g oral tablet 1 tab(s) orally 3 times a day   	    	  Medication name, indication, administration, side effects, and monitoring reviewed for new medications during post discharge counseling visit with Caregiver. Caregiver demonstrated understanding. Counseling offered for all medications.	  	  	  Per the discharge instructions, the patient was to take furosemide 20 mg daily, and 30 tablets were dispensed at discharge. However, during post discharge counseling,  the patient’s  reports he was told  that the medication should only be continued for six days. Inpatient team confirmed furosemide as daily for 30 days.   Prior to admission, the patient was taking gabapentin 100 mg—2 tablet in the morning and 1 tablets at bedtime. At discharge, the patient was prescribed gabapentin 300 mg once daily. Per inpatient team, patient may resume home regimen.  Metoprolol tartrate was increased from 25 mg BID to 25 mg every 6 hours during the admission. The caregiver expressed concern about the increased frequency and is having difficulty administering it every 6 hours at home. Inpatient team informed and recommended that patient follow up outpatient regarding frequency/dose adjustments.    Discharge instructions indicate sodium chloride 1 g TID, consistent with the inpatient order. However, the patient had been taking sodium chloride 500 mg BID prior to admission and has continued this regimen since discharge. Inpatient team was informed.   	  Italia Goncalves, PharmD	  Clinical Pharmacy Specialist, Pharmacy Telehealth Team	  Can be reached via [place] Teams or [phone]

## 2025-05-30 RX ORDER — GABAPENTIN 400 MG/1
2 CAPSULE ORAL
Refills: 0 | DISCHARGE

## 2025-05-30 RX ORDER — GABAPENTIN 400 MG/1
1 CAPSULE ORAL
Refills: 0 | DISCHARGE

## 2025-05-30 RX ORDER — METOPROLOL SUCCINATE 50 MG/1
1 TABLET, EXTENDED RELEASE ORAL
Refills: 0 | DISCHARGE

## 2025-06-02 PROCEDURE — 96374 THER/PROPH/DIAG INJ IV PUSH: CPT

## 2025-06-02 PROCEDURE — 83550 IRON BINDING TEST: CPT

## 2025-06-02 PROCEDURE — 83605 ASSAY OF LACTIC ACID: CPT

## 2025-06-02 PROCEDURE — 80053 COMPREHEN METABOLIC PANEL: CPT

## 2025-06-02 PROCEDURE — 94760 N-INVAS EAR/PLS OXIMETRY 1: CPT

## 2025-06-02 PROCEDURE — 82728 ASSAY OF FERRITIN: CPT

## 2025-06-02 PROCEDURE — 85027 COMPLETE CBC AUTOMATED: CPT

## 2025-06-02 PROCEDURE — 71045 X-RAY EXAM CHEST 1 VIEW: CPT

## 2025-06-02 PROCEDURE — 93005 ELECTROCARDIOGRAM TRACING: CPT

## 2025-06-02 PROCEDURE — 87040 BLOOD CULTURE FOR BACTERIA: CPT

## 2025-06-02 PROCEDURE — 36415 COLL VENOUS BLD VENIPUNCTURE: CPT

## 2025-06-02 PROCEDURE — 0225U NFCT DS DNA&RNA 21 SARSCOV2: CPT

## 2025-06-02 PROCEDURE — 83880 ASSAY OF NATRIURETIC PEPTIDE: CPT

## 2025-06-02 PROCEDURE — 82607 VITAMIN B-12: CPT

## 2025-06-02 PROCEDURE — 80048 BASIC METABOLIC PNL TOTAL CA: CPT

## 2025-06-02 PROCEDURE — 99291 CRITICAL CARE FIRST HOUR: CPT

## 2025-06-02 PROCEDURE — 97116 GAIT TRAINING THERAPY: CPT

## 2025-06-02 PROCEDURE — 71046 X-RAY EXAM CHEST 2 VIEWS: CPT

## 2025-06-02 PROCEDURE — 85730 THROMBOPLASTIN TIME PARTIAL: CPT

## 2025-06-02 PROCEDURE — 97530 THERAPEUTIC ACTIVITIES: CPT

## 2025-06-02 PROCEDURE — 84443 ASSAY THYROID STIM HORMONE: CPT

## 2025-06-02 PROCEDURE — 94640 AIRWAY INHALATION TREATMENT: CPT

## 2025-06-02 PROCEDURE — 85610 PROTHROMBIN TIME: CPT

## 2025-06-02 PROCEDURE — 84100 ASSAY OF PHOSPHORUS: CPT

## 2025-06-02 PROCEDURE — 83540 ASSAY OF IRON: CPT

## 2025-06-02 PROCEDURE — 82306 VITAMIN D 25 HYDROXY: CPT

## 2025-06-02 PROCEDURE — 97163 PT EVAL HIGH COMPLEX 45 MIN: CPT

## 2025-06-02 PROCEDURE — 81001 URINALYSIS AUTO W/SCOPE: CPT

## 2025-06-02 PROCEDURE — 85025 COMPLETE CBC W/AUTO DIFF WBC: CPT

## 2025-06-02 PROCEDURE — 83735 ASSAY OF MAGNESIUM: CPT

## 2025-06-02 PROCEDURE — 82746 ASSAY OF FOLIC ACID SERUM: CPT

## 2025-06-02 PROCEDURE — 84484 ASSAY OF TROPONIN QUANT: CPT

## 2025-06-02 PROCEDURE — 84466 ASSAY OF TRANSFERRIN: CPT

## 2025-06-02 PROCEDURE — 93306 TTE W/DOPPLER COMPLETE: CPT

## 2025-07-28 ENCOUNTER — OFFICE (OUTPATIENT)
Dept: URBAN - METROPOLITAN AREA CLINIC 115 | Facility: CLINIC | Age: OVER 89
Setting detail: OPHTHALMOLOGY
End: 2025-07-28
Payer: MEDICARE

## 2025-07-28 DIAGNOSIS — H02.135: ICD-10-CM

## 2025-07-28 DIAGNOSIS — H01.011: ICD-10-CM

## 2025-07-28 DIAGNOSIS — H26.493: ICD-10-CM

## 2025-07-28 DIAGNOSIS — H35.031: ICD-10-CM

## 2025-07-28 DIAGNOSIS — Z96.1: ICD-10-CM

## 2025-07-28 DIAGNOSIS — H35.032: ICD-10-CM

## 2025-07-28 DIAGNOSIS — H04.122: ICD-10-CM

## 2025-07-28 DIAGNOSIS — H01.014: ICD-10-CM

## 2025-07-28 DIAGNOSIS — H04.121: ICD-10-CM

## 2025-07-28 DIAGNOSIS — H02.132: ICD-10-CM

## 2025-07-28 PROCEDURE — 92012 INTRM OPH EXAM EST PATIENT: CPT | Performed by: OPHTHALMOLOGY

## 2025-07-28 ASSESSMENT — REFRACTION_AUTOREFRACTION
OS_SPHERE: +2.00
OD_SPHERE: +1.75
OS_AXIS: 086
OD_AXIS: 077
OS_CYLINDER: -2.00
OD_CYLINDER: -2.50

## 2025-07-28 ASSESSMENT — CONFRONTATIONAL VISUAL FIELD TEST (CVF)
OS_FINDINGS: FULL
OD_FINDINGS: FULL

## 2025-07-28 ASSESSMENT — REFRACTION_CURRENTRX
OS_OVR_VA: 20/
OD_AXIS: 096
OS_SPHERE: +4.00
OD_VPRISM_DIRECTION: SV
OS_CYLINDER: -0.75
OD_SPHERE: +4.25
OS_AXIS: 087
OD_OVR_VA: 20/
OD_CYLINDER: -0.25

## 2025-07-28 ASSESSMENT — SUPERFICIAL PUNCTATE KERATITIS (SPK)
OD_SPK: 1+
OS_SPK: 1+

## 2025-07-28 ASSESSMENT — LID EXAM ASSESSMENTS
OS_BLEPHARITIS: LLL LUL 1+
OD_BLEPHARITIS: RLL RUL 1+

## 2025-07-28 ASSESSMENT — LID POSITION - ECTROPION
OD_ECTROPION: RLL T
OS_ECTROPION: LLL T

## 2025-07-28 ASSESSMENT — TONOMETRY
OS_IOP_MMHG: 19
OD_IOP_MMHG: 17

## 2025-07-28 ASSESSMENT — VISUAL ACUITY
OD_BCVA: 20/50
OS_BCVA: 20/60

## (undated) DEVICE — ELCTR AQUAMANTYS BIPOLAR SEALER 6.0

## (undated) DEVICE — SOL IRR POUR H2O 1000ML

## (undated) DEVICE — GLV 8 PROTEXIS ORTHO (BROWN)

## (undated) DEVICE — ELCTR STRYKER NEPTUNE SMOKE EVACUATION PENCIL (GREEN)

## (undated) DEVICE — SUT STRATAFIX SPIRAL PDS PLUS 1 35X35CM CTX VIOLET

## (undated) DEVICE — SUT SILK 2-0 24" TIES

## (undated) DEVICE — PACK MINOR WITH LAP

## (undated) DEVICE — WOUND IRR SURGIPHOR

## (undated) DEVICE — SUT VICRYL 2-0 27" CT-2 UNDYED

## (undated) DEVICE — ELCTR GROUNDING PAD ADULT COVIDIEN

## (undated) DEVICE — SUT STRATAFIX SPIRAL MONOCRYL PLUS 3-0 30CM PS-2 UNDYED

## (undated) DEVICE — DRAPE C ARM UNIVERSAL

## (undated) DEVICE — SUT VICRYL 0 36" CT-1 UNDYED

## (undated) DEVICE — MAKO DRAPE KIT

## (undated) DEVICE — MAKO VIZADISC HIP PROCEDURE TRACKING KIT

## (undated) DEVICE — VENODYNE/SCD SLEEVE CALF MEDIUM

## (undated) DEVICE — SUT ETHIBOND 5 4-30" CCS

## (undated) DEVICE — GLV 8 PROTEXIS (BLUE)

## (undated) DEVICE — DRSG DERMABOND 0.7ML

## (undated) DEVICE — DRSG OPSITE 2.5 X 2"

## (undated) DEVICE — SOL IRR POUR NS 0.9% 1000ML

## (undated) DEVICE — WARMING BLANKET UPPER ADULT

## (undated) DEVICE — DRSG DERMABOND PRINEO 60CM

## (undated) DEVICE — SYR LUER LOK 30CC

## (undated) DEVICE — PACK TOTAL HIP

## (undated) DEVICE — DRAPE IOBAN 33" X 23"

## (undated) DEVICE — DRILL BIT STRYKER ORTHO TRAUMA 4.9MM

## (undated) DEVICE — DRAPE XL SHEET 77X98"

## (undated) DEVICE — HOOD FLYTE STRYKER SURGICOOL W PEELAWAY

## (undated) DEVICE — DRAPE SHOWER CURTAIN ISOLATION

## (undated) DEVICE — SOL IRR BAG NS 0.9% 3000ML

## (undated) DEVICE — DRSG MEPILEX 10 X 25CM (4 X 10") POST OP AG SILVER

## (undated) DEVICE — DRAPE 3/4 SHEET 52X76"

## (undated) DEVICE — NDL HYPO SAFE 20G X 1.5" (YELLOW)

## (undated) DEVICE — POSITIONER FOAM EGG CRATE ULNAR 2PCS (PINK)

## (undated) DEVICE — DRAPE 1/2 SHEET 40X57"